# Patient Record
Sex: FEMALE | Race: WHITE | NOT HISPANIC OR LATINO | Employment: OTHER | URBAN - METROPOLITAN AREA
[De-identification: names, ages, dates, MRNs, and addresses within clinical notes are randomized per-mention and may not be internally consistent; named-entity substitution may affect disease eponyms.]

---

## 2017-01-23 DIAGNOSIS — I10 ESSENTIAL HYPERTENSION WITH GOAL BLOOD PRESSURE LESS THAN 140/90: Primary | ICD-10-CM

## 2017-01-23 RX ORDER — PROPRANOLOL HCL 60 MG
60 CAPSULE, EXTENDED RELEASE 24HR ORAL DAILY
Qty: 90 CAPSULE | Refills: 1 | Status: SHIPPED | OUTPATIENT
Start: 2017-01-23 | End: 2017-07-19

## 2017-01-23 NOTE — TELEPHONE ENCOUNTER
Propranolol      Last Written Prescription Date: 7/26/16  Last Fill Quantity: 90, # refills: 1    Last Office Visit with G, P or [facility] prescribing provider:  9/1/16   Future Office Visit:        BP Readings from Last 3 Encounters:   11/01/16 153/85   10/11/16 131/84   09/29/16 122/72     Last BP 11/1/16 was specialty visit.  Routing refill request to provider for review/approval because:  BP > 140/90

## 2017-01-23 NOTE — TELEPHONE ENCOUNTER
..Reason for Call:  Medication or medication refill:    Do you use a [facility]?  Name of the pharmacy and phone number for the current request:  [facility] [address] - [phone]    Name of the medication requested: propranolol(INDERAL LA) 60 mg 24 hr cap    Other request: pt states she called it in to her pharmacy one week+ ago; taking last cap today     Can we leave a detailed message on this number? YES    Phone number patient can be reached at: Home number on file [phone] (home)    Best Time: anytime    Call taken on 1/23/2017 at 10:47 AM by Radha Silverman

## 2017-02-22 DIAGNOSIS — M48.061 LUMBAR SPINAL STENOSIS: ICD-10-CM

## 2017-02-22 RX ORDER — HYDROCODONE BITARTRATE AND ACETAMINOPHEN 5; 325 MG/1; MG/1
1-2 TABLET ORAL 3 TIMES DAILY PRN
Qty: 80 TABLET | Refills: 0 | Status: SHIPPED | OUTPATIENT
Start: 2017-02-22 | End: 2017-06-13

## 2017-02-22 NOTE — TELEPHONE ENCOUNTER
Norco 5/325mg      Last Written Prescription Date:  11/15/2016  Last Fill Quantity: 80,   # refills: 0  Last Office Visit with [facility], [facility] or [facility] prescribing provider: 9/1/2016  Future Office visit:       Routing refill request to provider for review/approval because:  Drug not on the [facility], [facility] or [facility] refill protocol or controlled substance    Thank you,    Bull Zamarripa [facility] [place]  P. [phone]

## 2017-02-22 NOTE — TELEPHONE ENCOUNTER
Written rx will be deliver to the pharmacy this afternoon.  Our pharmacy will notify pt when ready for pickup.  Huan Cespedes,  [facility]

## 2017-04-25 ENCOUNTER — OFFICE VISIT (OUTPATIENT)
Dept: FAMILY MEDICINE | Facility: CLINIC | Age: 82
End: 2017-04-25
Payer: MEDICARE

## 2017-04-25 VITALS
BODY MASS INDEX: 34.12 KG/M2 | HEART RATE: 88 BPM | WEIGHT: 185.4 LBS | RESPIRATION RATE: 22 BRPM | HEIGHT: 62 IN | SYSTOLIC BLOOD PRESSURE: 139 MMHG | DIASTOLIC BLOOD PRESSURE: 82 MMHG | TEMPERATURE: 98.6 F | OXYGEN SATURATION: 93 %

## 2017-04-25 DIAGNOSIS — N30.00 ACUTE CYSTITIS WITHOUT HEMATURIA: Primary | ICD-10-CM

## 2017-04-25 LAB
ALBUMIN UR-MCNC: NEGATIVE MG/DL
APPEARANCE UR: CLEAR
BACTERIA #/AREA URNS HPF: ABNORMAL /HPF
BILIRUB UR QL STRIP: NEGATIVE
COLOR UR AUTO: YELLOW
GLUCOSE UR STRIP-MCNC: NEGATIVE MG/DL
HGB UR QL STRIP: ABNORMAL
KETONES UR STRIP-MCNC: NEGATIVE MG/DL
LEUKOCYTE ESTERASE UR QL STRIP: ABNORMAL
NITRATE UR QL: NEGATIVE
NON-SQ EPI CELLS #/AREA URNS LPF: ABNORMAL /LPF
PH UR STRIP: 6 PH (ref 5–7)
RBC #/AREA URNS AUTO: ABNORMAL /HPF (ref 0–2)
SP GR UR STRIP: 1.01 (ref 1–1.03)
URN SPEC COLLECT METH UR: ABNORMAL
UROBILINOGEN UR STRIP-ACNC: 0.2 EU/DL (ref 0.2–1)
WBC #/AREA URNS AUTO: ABNORMAL /HPF (ref 0–2)

## 2017-04-25 PROCEDURE — 99213 OFFICE O/P EST LOW 20 MIN: CPT | Performed by: PHYSICIAN ASSISTANT

## 2017-04-25 PROCEDURE — 81001 URINALYSIS AUTO W/SCOPE: CPT | Performed by: PHYSICIAN ASSISTANT

## 2017-04-25 PROCEDURE — 87086 URINE CULTURE/COLONY COUNT: CPT | Performed by: PHYSICIAN ASSISTANT

## 2017-04-25 RX ORDER — CIPROFLOXACIN 250 MG/1
250 TABLET, FILM COATED ORAL 2 TIMES DAILY
Qty: 14 TABLET | Refills: 0 | Status: SHIPPED | OUTPATIENT
Start: 2017-04-25 | End: 2017-05-02

## 2017-04-25 ASSESSMENT — PAIN SCALES - GENERAL: PAINLEVEL: NO PAIN (0)

## 2017-04-25 NOTE — MR AVS SNAPSHOT
After Visit Summary   4/25/2017    Ivonne Gerard    MRN: [id number]           Patient Information     Date Of Birth          1935        Visit Information        Provider Department      4/25/2017 11:20 AM Karo Vogel PA-C [facility]        Today's Diagnoses     Urinary frequency    -  1    Acute cystitis without hematuria          Care Instructions    Cipro 250 mg twice a day for 7 days  Increase fluids  Follow up or call in 3 days if not better  Urine culture is pending  For prevention wipe front to back, and wash with soap and water    Understanding Urinary Tract Infections (UTIs)  Most UTIs are caused by bacteria, although they may also be caused by viruses or fungi. Bacteria from the bowel are the most common source of infection. The infection may begin because of any of the following:    Sexual activity. During sex, germs can travel from the penis, vagina, or rectum into the urethra.     Germs on the skin outside the rectum may travel into the urethra. This is more common in women since the rectum and urethra are closer to each other than in men. Wiping from front to back after using the toilet and keeping the area clean can help prevent germs from getting to the urethra.    Blockage of urine flow through the urinary tract. If urine sits too long, germs may begin to grow out of control.      Parts of the urinary tract  The infection can occur in any part of the urinary tract.    The kidneys collect and store urine.    The ureters carry urine from the kidneys to the bladder.    The bladder holds urine until you are ready to let it out.    The urethra carries urine from the bladder out of the body. It is shorter in women, so bacteria can move through it more easily. The urethra is longer in men, so a UTI is less likely to reach the bladder or kidneys in men.    2280-4605 [employer] [address] [place] [address], [place] [address]. All rights  "reserved. This information is not intended as a substitute for professional medical care. Always follow your healthcare professional's instructions.              Follow-ups after your visit        Who to contact     If you have questions or need follow up information about today's clinic visit or your schedule please contact [facility] directly at [phone].  Normal or non-critical lab and imaging results will be communicated to you by MyChart, letter or phone within 4 business days after the clinic has received the results. If you do not hear from us within 7 days, please contact the clinic through [employer] or phone. If you have a critical or abnormal lab result, we will notify you by phone as soon as possible.  Submit refill requests through [employer] or call your pharmacy and they will forward the refill request to us. Please allow 3 business days for your refill to be completed.          Additional Information About Your Visit        MyChart Information     [employer] gives you secure access to your electronic health record. If you see a primary care provider, you can also send messages to your care team and make appointments. If you have questions, please call your primary care clinic.  If you do not have a primary care provider, please call [phone] and they will assist you.        Care EveryWhere ID     This is your Care EveryWhere ID. This could be used by other organizations to access your [facility] medical records  [id number]        Your Vitals Were     Pulse Temperature Respirations Height Pulse Oximetry BMI (Body Mass Index)    88 98.6  F (37  C) (Oral) 22 5' 2\" (1.575 m) 93% 33.91 kg/m2       Blood Pressure from Last 3 Encounters:   04/25/17 139/82   11/01/16 153/85   10/11/16 131/84    Weight from Last 3 Encounters:   04/25/17 185 lb 6.4 oz (84.1 kg)   11/01/16 187 lb (84.8 kg)   10/04/16 180 lb (81.6 kg)              We Performed the Following     *UA reflex to Microscopic and " Culture ([place] [facility] (except [place] and [place])     Urine Culture Aerobic Bacterial     Urine Microscopic          Today's Medication Changes          These changes are accurate as of: 4/25/17 12:17 PM.  If you have any questions, ask your nurse or doctor.               Start taking these medicines.        Dose/Directions    ciprofloxacin 250 MG tablet   Commonly known as:  CIPRO   Used for:  Acute cystitis without hematuria   Started by:  Karo Vogel PA-C        Dose:  250 mg   Take 1 tablet (250 mg) by mouth 2 times daily for 7 days   Quantity:  14 tablet   Refills:  0            Where to get your medicines      These medications were [place] to [facility] [place] - [address]  [address], [facility] [address]     Phone:  [phone]     ciprofloxacin 250 MG tablet                Primary Care Provider Office Phone # Fax #    Luna Jozef Lu [phone]       [address] [address]        Thank you!     Thank you for choosing [facility]  for your care. Our goal is always to provide you with excellent care. Hearing back from our patients is one way we can continue to improve our services. Please take a few minutes to complete the written survey that you may receive in the mail after your visit with us. Thank you!             Your Updated Medication List - Protect others around you: Learn how to safely use, store and throw away your medicines at www.disposemymeds.org.          This list is accurate as of: 4/25/17 12:17 PM.  Always use your most recent med list.                   Brand Name Dispense Instructions for use    aspirin 81 MG tablet     90 tablet    Take 1 tablet (81 mg) by mouth daily       ciprofloxacin 250 MG tablet    CIPRO    14 tablet    Take 1 tablet (250 mg) by mouth 2 times daily for 7 days       diclofenac 75 MG EC tablet     VOLTAREN    180 tablet    Take 1 tablet (75 mg) by mouth 2 times daily       ferrous sulfate 325 (65 FE) MG tablet    IRON    60 tablet    Take 1 tablet (325 mg) by mouth 2 times daily       HYDROcodone-acetaminophen 5-325 MG per tablet    NORCO    80 tablet    Take 1-2 tablets by mouth 3 times daily as needed for pain       IMODIUM A-D 2 MG tablet   Generic drug:  loperamide      1 TABLET AS NEEDED       omeprazole 20 MG CR capsule    priLOSEC    90 capsule    TAKE 1 CAPSULE BY MOUTH EVERY DAY       phenazopyridine 200 MG tablet    PYRIDIUM    6 tablet    Take 1 tablet (200 mg) by mouth 3 times daily as needed for irritation Expect your urine to appear bright orange       propranolol 60 MG 24 hr capsule    INDERAL LA    90 capsule    Take 1 capsule (60 mg) by mouth daily       sertraline 100 MG tablet    ZOLOFT    90 tablet    Take 1 tablet (100 mg) by mouth daily

## 2017-04-25 NOTE — PATIENT INSTRUCTIONS
Cipro 250 mg twice a day for 7 days  Increase fluids  Follow up or call in 3 days if not better  Urine culture is pending  For prevention wipe front to back, and wash with soap and water    Understanding Urinary Tract Infections (UTIs)  Most UTIs are caused by bacteria, although they may also be caused by viruses or fungi. Bacteria from the bowel are the most common source of infection. The infection may begin because of any of the following:    Sexual activity. During sex, germs can travel from the penis, vagina, or rectum into the urethra.     Germs on the skin outside the rectum may travel into the urethra. This is more common in women since the rectum and urethra are closer to each other than in men. Wiping from front to back after using the toilet and keeping the area clean can help prevent germs from getting to the urethra.    Blockage of urine flow through the urinary tract. If urine sits too long, germs may begin to grow out of control.      Parts of the urinary tract  The infection can occur in any part of the urinary tract.    The kidneys collect and store urine.    The ureters carry urine from the kidneys to the bladder.    The bladder holds urine until you are ready to let it out.    The urethra carries urine from the bladder out of the body. It is shorter in women, so bacteria can move through it more easily. The urethra is longer in men, so a UTI is less likely to reach the bladder or kidneys in men.    9078-9395 [employer]. [address] [place] [address]. All rights reserved. This information is not intended as a substitute for professional medical care. Always follow your healthcare professional's instructions.

## 2017-04-25 NOTE — PROGRESS NOTES
SUBJECTIVE:                                                    Ivonne Gerard is a 81 year old female who presents to clinic today for the following health issues:      URINARY TRACT SYMPTOMS     Onset: 3 days     Description:   Painful urination (Dysuria): YES  Blood in urine (Hematuria): no   Delay in urine (Hesitency): YES    Intensity: moderate    Progression of Symptoms:  worsening and constant    Accompanying Signs & Symptoms:  Fever/chills: YES- 4/24/017 only   Flank pain no   Nausea and vomiting: YES- 4/24/2017  Any vaginal symptoms: vaginal discharge, 4/22 & 4/23 and   Abdominal/Pelvic Pain: no    History:   History of frequent UTI's: YES  History of kidney stones: no   Sexually Active: no   Possibility of pregnancy: No    Precipitating factors:   Loose stools          Therapies Tried and outcome: Cranberry juice prn (contraindicated in Coumadin patients)            Problem list and histories reviewed & adjusted, as indicated.  Additional history: as documented    Patient Active Problem List   Diagnosis     Obesity     Cataract     Anxiety associated with depression     Arthritis     Osteopenia     Allergic rhinitis     Advanced directives, counseling/discussion     Recurrent UTI     Postmenopausal atrophic vaginitis     Major depression in complete remission (H)     Lumbar spinal stenosis     Wrist fracture     Scoliosis associated with other condition     Postlaminectomy syndrome     H/O total hip arthroplasty     Major depression, recurrent (H)     Osteoporosis     Infected hardware in left leg, initial encounter (H)     Essential hypertension with goal blood pressure less than 140/90     Gastroesophageal reflux disease, esophagitis presence not specified     Fracture of ankle     Cellulitis of lower extremity     Positive FIT (fecal immunochemical test)     Anemia, unspecified type     Past Surgical History:   Procedure Laterality Date     COLONOSCOPY  9/25/2012    Procedure: COLONOSCOPY;   COLONOSCOPY SCREEN/ [facility];  Surgeon: Xiang Nielsen MD;  Location: [facility] OR     COLONOSCOPY WITH CO2 INSUFFLATION N/A 10/11/2016    Procedure: COLONOSCOPY WITH CO2 INSUFFLATION;  Surgeon: Lynne Hansen MD;  Location: [facility] OR     FOOT SURGERY Left End of July 2016    Hardware removal     SURGICAL HISTORY OF -   1994    right total hip     SURGICAL HISTORY OF - 9/98    repair perforated left ear drum     SURGICAL HISTORY OF - 5/99    discectomy lumbar       Social History   Substance Use Topics     Smoking status: Former Smoker     Packs/day: 1.00     Years: 30.00     Types: Cigarettes     Quit date: 2/10/1986     Smokeless tobacco: Never Used      Comment: quit 20 yrs     Alcohol use Yes      Comment: once a month      Family History   Problem Relation Age of Onset     Cancer - colorectal Other      cousins on mothers side     Allergies Mother      asthma     Cancer - colorectal Mother      Breast Cancer Mother      Alzheimer Disease Mother      dementia     Coronary Artery Disease Brother      stents     Hyperlipidemia No family hx of      Hypertension No family hx of          Current Outpatient Prescriptions   Medication Sig Dispense Refill     ciprofloxacin (CIPRO) 250 MG tablet Take 1 tablet (250 mg) by mouth 2 times daily for 7 days 14 tablet 0     HYDROcodone-acetaminophen (NORCO) 5-325 MG per tablet Take 1-2 tablets by mouth 3 times daily as needed for pain 80 tablet 0     propranolol (INDERAL LA) 60 MG 24 hr capsule Take 1 capsule (60 mg) by mouth daily 90 capsule 1     sertraline (ZOLOFT) 100 MG tablet Take 1 tablet (100 mg) by mouth daily 90 tablet 1     ferrous sulfate (IRON) 325 (65 FE) MG tablet Take 1 tablet (325 mg) by mouth 2 times daily 60 tablet 2     omeprazole (PRILOSEC) 20 MG capsule TAKE 1 CAPSULE BY MOUTH EVERY DAY 90 capsule 3     diclofenac (VOLTAREN) 75 MG EC tablet Take 1 tablet (75 mg) by mouth 2 times daily 180 tablet 3     aspirin 81 MG tablet Take 1 tablet (81  "mg) by mouth daily 90 tablet 3     IMODIUM A-D 2 MG PO TABS 1 TABLET AS NEEDED       phenazopyridine (PYRIDIUM) 200 MG tablet Take 1 tablet (200 mg) by mouth 3 times daily as needed for irritation Expect your urine to appear bright orange (Patient not taking: Reported on 4/25/2017) 6 tablet 0     Allergies   Allergen Reactions     Sulfa Drugs Hives       Reviewed and updated as needed this visit by clinical staff  Tobacco  Allergies       Reviewed and updated as needed this visit by Provider  Tobacco         ROS:  Constitutional, HEENT, cardiovascular, pulmonary, gi and gu systems are negative, except as otherwise noted.    OBJECTIVE:                                                    /82  Pulse 88  Temp 98.6  F (37  C) (Oral)  Resp 22  Ht 5' 2\" (1.575 m)  Wt 185 lb 6.4 oz (84.1 kg)  SpO2 93%  BMI 33.91 kg/m2  Body mass index is 33.91 kg/(m^2).  GENERAL: no distress and elderly  NECK: no adenopathy, no asymmetry, masses, or scars and thyroid normal to palpation  RESP: lungs clear to auscultation - no rales, rhonchi or wheezes  CV: regular rate and rhythm, normal S1 S2, no S3 or S4, no murmur, click or rub, no peripheral edema and peripheral pulses strong  ABDOMEN: soft, nontender, no hepatosplenomegaly, no masses and bowel sounds normal  MS: no gross musculoskeletal defects noted, no edema  BACK: no CVA tenderness, no paralumbar tenderness    Diagnostic Test Results:  Results for orders placed or performed in visit on 04/25/17 (from the past 24 hour(s))   *UA reflex to Microscopic and Culture ([place] [facility] (except [place] and [place])   Result Value Ref Range    Color Urine Yellow     Appearance Urine Clear     Glucose Urine Negative NEG mg/dL    Bilirubin Urine Negative NEG    Ketones Urine Negative NEG mg/dL    Specific Gravity Urine 1.015 1.003 - 1.035    Blood Urine Small (A) NEG    pH Urine 6.0 5.0 - 7.0 pH    Protein Albumin Urine Negative NEG mg/dL    Urobilinogen Urine 0.2 " 0.2 - 1.0 EU/dL    Nitrite Urine Negative NEG    Leukocyte Esterase Urine Trace (A) NEG    Source Midstream Urine    Urine Microscopic   Result Value Ref Range    WBC Urine 2-5 (A) 0 - 2 /HPF    RBC Urine 2-5 (A) 0 - 2 /HPF    Squamous Epithelial /LPF Urine Few FEW /LPF    Bacteria Urine Few (A) NEG /HPF        ASSESSMENT/PLAN:                                                        ICD-10-CM    1. Acute cystitis without hematuria N30.00 *UA reflex to Microscopic and Culture ([place] [facility] (except [place] and [place])     Urine Microscopic     Urine Culture Aerobic Bacterial     ciprofloxacin (CIPRO) 250 MG tablet   patient has requested Cipro as Macrobid does not work for her. Discussed risks of tendonitis and tendon rupture. Patient is aware.   Cipro 250 mg twice a day for 7 days  Increase fluids  Follow up or call in 3 days if not better  Urine culture is pending  For prevention wipe front to back, and wash with soap and water        Karo Vogel PA-C  [facility]

## 2017-04-25 NOTE — NURSING NOTE
"Chief Complaint   Patient presents with     Urinary Problem       Initial /90 (BP Location: Left arm, Patient Position: Chair, Cuff Size: Adult Large)  Pulse 88  Temp 98.6  F (37  C) (Oral)  Resp 22  Ht 5' 2\" (1.575 m)  Wt 185 lb 6.4 oz (84.1 kg)  SpO2 93%  BMI 33.91 kg/m2 Estimated body mass index is 33.91 kg/(m^2) as calculated from the following:    Height as of this encounter: 5' 2\" (1.575 m).    Weight as of this encounter: 185 lb 6.4 oz (84.1 kg).  Medication Reconciliation: complete     Mary Almanza CMA      "

## 2017-04-26 LAB
BACTERIA SPEC CULT: NORMAL
MICRO REPORT STATUS: NORMAL
SPECIMEN SOURCE: NORMAL

## 2017-04-26 ASSESSMENT — PATIENT HEALTH QUESTIONNAIRE - PHQ9: SUM OF ALL RESPONSES TO PHQ QUESTIONS 1-9: 1

## 2017-06-13 DIAGNOSIS — M48.061 LUMBAR SPINAL STENOSIS: ICD-10-CM

## 2017-06-13 RX ORDER — HYDROCODONE BITARTRATE AND ACETAMINOPHEN 5; 325 MG/1; MG/1
1-2 TABLET ORAL 3 TIMES DAILY PRN
Qty: 80 TABLET | Refills: 0 | Status: SHIPPED | OUTPATIENT
Start: 2017-06-13 | End: 2017-09-25

## 2017-06-13 NOTE — TELEPHONE ENCOUNTER
norco 5-325      Last Written Prescription Date:  2/22/17  Last Fill Quantity: 80,   # refills: 0  Last Office Visit with [facility], [facility] or [facility] prescribing provider: 4/25/17  Future Office visit:       Routing refill request to provider for review/approval because:  Drug not on the [facility], [facility] or [facility] refill protocol or controlled substance  Thank you very kindly!  Kaitlin Lopes [facility] [place]

## 2017-07-19 DIAGNOSIS — I10 ESSENTIAL HYPERTENSION WITH GOAL BLOOD PRESSURE LESS THAN 140/90: ICD-10-CM

## 2017-07-19 NOTE — TELEPHONE ENCOUNTER
propranolol (INDERAL LA) 60 MG 24 hr capsule      Last Written Prescription Date: 1/23/17  Last Fill Quantity: 90, # refills: 1  Last Office Visit with [facility], [facility] or [facility] prescribing provider: 4/25/17       BP Readings from Last 3 Encounters:   04/25/17 139/82   11/01/16 153/85   10/11/16 131/84

## 2017-07-20 DIAGNOSIS — I10 ESSENTIAL HYPERTENSION WITH GOAL BLOOD PRESSURE LESS THAN 140/90: ICD-10-CM

## 2017-07-20 RX ORDER — PROPRANOLOL HCL 60 MG
CAPSULE, EXTENDED RELEASE 24HR ORAL
Qty: 30 CAPSULE | Refills: 0 | Status: SHIPPED | OUTPATIENT
Start: 2017-07-20 | End: 2017-07-20

## 2017-07-24 RX ORDER — PROPRANOLOL HCL 60 MG
CAPSULE, EXTENDED RELEASE 24HR ORAL
Qty: 90 CAPSULE | Refills: 0 | Status: SHIPPED | OUTPATIENT
Start: 2017-07-24 | End: 2017-08-23

## 2017-07-24 NOTE — TELEPHONE ENCOUNTER
Routing refill request to provider for review/approval because:  Patient needs to be seen  propranolol (INDERAL LA) 60 MG 24 hr capsule 30 capsule 0 7/20/2017  No   Sig: TAKE 1 CAPSULE(60 MG) BY MOUTH DAILY   Class: E-Prescribe   Notes to Pharmacy: Due for follow up   Order: [id number]     Janeth Logan RN.

## 2017-08-23 ENCOUNTER — OFFICE VISIT (OUTPATIENT)
Dept: FAMILY MEDICINE | Facility: CLINIC | Age: 82
End: 2017-08-23
Payer: MEDICARE

## 2017-08-23 VITALS
BODY MASS INDEX: 34.6 KG/M2 | HEIGHT: 62 IN | SYSTOLIC BLOOD PRESSURE: 130 MMHG | WEIGHT: 188 LBS | DIASTOLIC BLOOD PRESSURE: 68 MMHG | TEMPERATURE: 98.3 F | HEART RATE: 75 BPM | OXYGEN SATURATION: 95 %

## 2017-08-23 DIAGNOSIS — K21.9 GASTROESOPHAGEAL REFLUX DISEASE, ESOPHAGITIS PRESENCE NOT SPECIFIED: ICD-10-CM

## 2017-08-23 DIAGNOSIS — M48.061 LUMBAR SPINAL STENOSIS: ICD-10-CM

## 2017-08-23 DIAGNOSIS — I10 ESSENTIAL HYPERTENSION WITH GOAL BLOOD PRESSURE LESS THAN 140/90: Primary | ICD-10-CM

## 2017-08-23 DIAGNOSIS — F32.5 MAJOR DEPRESSION IN COMPLETE REMISSION (H): ICD-10-CM

## 2017-08-23 LAB
ANION GAP SERPL CALCULATED.3IONS-SCNC: 5 MMOL/L (ref 3–14)
BUN SERPL-MCNC: 21 MG/DL (ref 7–30)
CALCIUM SERPL-MCNC: 8.4 MG/DL (ref 8.5–10.1)
CHLORIDE SERPL-SCNC: 110 MMOL/L (ref 94–109)
CO2 SERPL-SCNC: 28 MMOL/L (ref 20–32)
CREAT SERPL-MCNC: 0.89 MG/DL (ref 0.52–1.04)
CREAT UR-MCNC: 117 MG/DL
GFR SERPL CREATININE-BSD FRML MDRD: 61 ML/MIN/1.7M2
GLUCOSE SERPL-MCNC: 100 MG/DL (ref 70–99)
MICROALBUMIN UR-MCNC: 10 MG/L
MICROALBUMIN/CREAT UR: 8.89 MG/G CR (ref 0–25)
POTASSIUM SERPL-SCNC: 3.7 MMOL/L (ref 3.4–5.3)
SODIUM SERPL-SCNC: 143 MMOL/L (ref 133–144)

## 2017-08-23 PROCEDURE — 36415 COLL VENOUS BLD VENIPUNCTURE: CPT | Performed by: FAMILY MEDICINE

## 2017-08-23 PROCEDURE — 82043 UR ALBUMIN QUANTITATIVE: CPT | Performed by: FAMILY MEDICINE

## 2017-08-23 PROCEDURE — 99214 OFFICE O/P EST MOD 30 MIN: CPT | Performed by: FAMILY MEDICINE

## 2017-08-23 PROCEDURE — 80048 BASIC METABOLIC PNL TOTAL CA: CPT | Performed by: FAMILY MEDICINE

## 2017-08-23 RX ORDER — PROPRANOLOL HCL 60 MG
CAPSULE, EXTENDED RELEASE 24HR ORAL
Qty: 90 CAPSULE | Refills: 3 | Status: SHIPPED | OUTPATIENT
Start: 2017-08-23 | End: 2018-08-08

## 2017-08-23 RX ORDER — DICLOFENAC SODIUM 75 MG/1
75 TABLET, DELAYED RELEASE ORAL 2 TIMES DAILY
Qty: 180 TABLET | Refills: 3 | Status: SHIPPED | OUTPATIENT
Start: 2017-08-23 | End: 2019-01-03

## 2017-08-23 ASSESSMENT — PAIN SCALES - GENERAL: PAINLEVEL: NO PAIN (0)

## 2017-08-23 NOTE — PROGRESS NOTES
SUBJECTIVE:   Ivonne Gerard is a 81 year old female who presents to clinic today for the following health issues:      Hypertension Follow-up      Outpatient blood pressures are being checked at home.  Results are normal per patient.    Low Salt Diet: low salt        Amount of exercise or physical activity: None    Problems taking medications regularly: No    Medication side effects: none    Diet: low salt and low fat/cholesterol    Problem list and histories reviewed & adjusted, as indicated.  Additional history: as documented    Patient Active Problem List   Diagnosis     Obesity     Cataract     Anxiety associated with depression     Arthritis     Osteopenia     Allergic rhinitis     Advanced directives, counseling/discussion     Recurrent UTI     Postmenopausal atrophic vaginitis     Major depression in complete remission (H)     Lumbar spinal stenosis     Wrist fracture     Scoliosis associated with other condition     Postlaminectomy syndrome     H/O total hip arthroplasty     Major depression, recurrent (H)     Osteoporosis     Infected hardware in left leg, initial encounter (H)     Essential hypertension with goal blood pressure less than 140/90     Gastroesophageal reflux disease, esophagitis presence not specified     Fracture of ankle     Cellulitis of lower extremity     Positive FIT (fecal immunochemical test)     Anemia, unspecified type     Past Surgical History:   Procedure Laterality Date     COLONOSCOPY  9/25/2012    Procedure: COLONOSCOPY;  COLONOSCOPY SCREEN/ [facility];  Surgeon: Xiang Nielsen MD;  Location: [facility]     COLONOSCOPY WITH CO2 INSUFFLATION N/A 10/11/2016    Procedure: COLONOSCOPY WITH CO2 INSUFFLATION;  Surgeon: Lynne Hansen MD;  Location: [facility] OR     FOOT SURGERY Left End of July 2016    Hardware removal     SURGICAL HISTORY OF -   1994    right total hip     SURGICAL HISTORY OF -   9/98    repair perforated left ear drum     SURGICAL HISTORY OF -   5/99     "discectomy lumbar       Social History   Substance Use Topics     Smoking status: Former Smoker     Packs/day: 1.00     Years: 30.00     Types: Cigarettes     Quit date: 2/10/1986     Smokeless tobacco: Never Used      Comment: quit 20 yrs     Alcohol use Yes      Comment: once a month      Family History   Problem Relation Age of Onset     Allergies Mother      asthma     Cancer - colorectal Mother      Breast Cancer Mother      Alzheimer Disease Mother      dementia     Coronary Artery Disease Brother      stents     Cancer - colorectal Other      cousins on mothers side     Hyperlipidemia No family hx of      Hypertension No family hx of              Reviewed and updated as needed this visit by clinical staffTobacco  Allergies  Meds  Med Hx  Surg Hx  Fam Hx  Soc Hx      Reviewed and updated as needed this visit by Provider         ROS:  Constitutional, HEENT, cardiovascular, pulmonary, GI, , musculoskeletal, neuro, skin, endocrine and psych systems are negative, except as otherwise noted.      OBJECTIVE:   /68 (BP Location: Right arm, Patient Position: Chair, Cuff Size: Adult Large)  Pulse 75  Temp 98.3  F (36.8  C) (Oral)  Ht 5' 2\" (1.575 m)  Wt 188 lb (85.3 kg)  SpO2 95%  BMI 34.39 kg/m2  Body mass index is 34.39 kg/(m^2).  GENERAL: healthy, alert and no distress  NECK: no adenopathy, no asymmetry, masses, or scars and thyroid normal to palpation  RESP: lungs clear to auscultation - no rales, rhonchi or wheezes  CV: regular rate and rhythm, normal S1 S2, no S3 or S4, no murmur, click or rub, no peripheral edema and peripheral pulses strong  ABDOMEN: soft, nontender, no hepatosplenomegaly, no masses and bowel sounds normal  MS: no gross musculoskeletal defects noted, no edema    Diagnostic Test Results:  none     ASSESSMENT/PLAN:     1. Essential hypertension with goal blood pressure less than 140/90  Controlled. Continue with current medication. Discussed following up in 6 months for routine " physical.  - propranolol (INDERAL LA) 60 MG 24 hr capsule; TAKE 1 CAPSULE BY MOUTH EVERY DAY  Dispense: 90 capsule; Refill: 3  - Basic metabolic panel  - Albumin Random Urine Quantitative with Creat Ratio    2. Major depression in complete remission (H)  Controlled.  - sertraline (ZOLOFT) 50 MG tablet; Take 1 tablet (50 mg) by mouth daily  Dispense: 90 tablet; Refill: 3    3. Gastroesophageal reflux disease, esophagitis presence not specified  Controlled.  - omeprazole (PRILOSEC) 20 MG CR capsule; TAKE 1 CAPSULE BY MOUTH EVERY DAY  Dispense: 90 capsule; Refill: 3    4. Lumbar spinal stenosis  Stable.  - diclofenac (VOLTAREN) 75 MG EC tablet; Take 1 tablet (75 mg) by mouth 2 times daily  Dispense: 180 tablet; Refill: 3    Regular exercise  See Patient Instructions    Steven Salinas MD, MD  [facility]

## 2017-08-23 NOTE — NURSING NOTE
"Chief Complaint   Patient presents with     Hypertension     Musculoskeletal Problem     cramps inner thigh       Initial /68 (BP Location: Right arm, Patient Position: Chair, Cuff Size: Adult Large)  Pulse 75  Temp 98.3  F (36.8  C) (Oral)  Ht 5' 2\" (1.575 m)  Wt 188 lb (85.3 kg)  SpO2 95%  BMI 34.39 kg/m2 Estimated body mass index is 34.39 kg/(m^2) as calculated from the following:    Height as of this encounter: 5' 2\" (1.575 m).    Weight as of this encounter: 188 lb (85.3 kg).  Medication Reconciliation: complete     Becky De Anda MA      "

## 2017-08-23 NOTE — MR AVS SNAPSHOT
After Visit Summary   8/23/2017    Ivonne Gerard    MRN: [id number]           Patient Information     Date Of Birth          1935        Visit Information        Provider Department      8/23/2017 2:40 PM Steven Salinas MD [facility]        Today's Diagnoses     Essential hypertension with goal blood pressure less than 140/90    -  1    Major depression in complete remission (H)        Gastroesophageal reflux disease, esophagitis presence not specified        Lumbar spinal stenosis           Follow-ups after your visit        Who to contact     If you have questions or need follow up information about today's clinic visit or your schedule please contact [facility] directly at [phone].  Normal or non-critical lab and imaging results will be communicated to you by MyChart, letter or phone within 4 business days after the clinic has received the results. If you do not hear from us within 7 days, please contact the clinic through Sylvan Sourcehart or phone. If you have a critical or abnormal lab result, we will notify you by phone as soon as possible.  Submit refill requests through Second Half Playbook or call your pharmacy and they will forward the refill request to us. Please allow 3 business days for your refill to be completed.          Additional Information About Your Visit        MyChart Information     Second Half Playbook gives you secure access to your electronic health record. If you see a primary care provider, you can also send messages to your care team and make appointments. If you have questions, please call your primary care clinic.  If you do not have a primary care provider, please call [phone] and they will assist you.        Care EveryWhere ID     This is your Care EveryWhere ID. This could be used by other organizations to access your [facility] medical records  [id number]        Your Vitals Were     Pulse Temperature Height Pulse Oximetry BMI (Body Mass Index)        "75 98.3  F (36.8  C) (Oral) 5' 2\" (1.575 m) 95% 34.39 kg/m2        Blood Pressure from Last 3 Encounters:   08/23/17 130/68   04/25/17 139/82   11/01/16 153/85    Weight from Last 3 Encounters:   08/23/17 188 lb (85.3 kg)   04/25/17 185 lb 6.4 oz (84.1 kg)   11/01/16 187 lb (84.8 kg)              We Performed the Following     Albumin Random Urine Quantitative with Creat Ratio     Basic metabolic panel          Today's Medication Changes          These changes are accurate as of: 8/23/17  3:06 PM.  If you have any questions, ask your nurse or doctor.               These medicines have changed or have updated prescriptions.        Dose/Directions    propranolol 60 MG 24 hr capsule   Commonly known as:  INDERAL LA   This may have changed:  See the new instructions.   Used for:  Essential hypertension with goal blood pressure less than 140/90   Changed by:  Steven Salinas MD        TAKE 1 CAPSULE BY MOUTH EVERY DAY   Quantity:  90 capsule   Refills:  3       sertraline 50 MG tablet   Commonly known as:  ZOLOFT   This may have changed:    - medication strength  - how much to take   Used for:  Major depression in complete remission (H)   Changed by:  Steven Salinas MD        Dose:  50 mg   Take 1 tablet (50 mg) by mouth daily   Quantity:  90 tablet   Refills:  3         Stop taking these medicines if you haven't already. Please contact your care team if you have questions.     ferrous sulfate 325 (65 FE) MG tablet   Commonly known as:  IRON   Stopped by:  Steven Salinas MD                Where to get your medicines      These medications were sent to [id number] - [place], [place] - [address] AT [facility] [address], [facility] [address]     Phone:  [phone]     diclofenac 75 MG EC tablet    omeprazole 20 MG CR capsule    propranolol 60 MG 24 hr capsule    sertraline 50 MG tablet                Primary Care Provider Office Phone # Fax #    Steven Salinas -[phone]    "    [address]  [facility] [address]        Equal Access to Services     KVNG HENAO : Hadii giacomo ku hadgarryo Soomaali, waaxda luqadaha, qaybta kaalmada antonellaadeelnani, gloria stevensedwinjessica aguilera . So [facility] [phone].    ATENCIÓN: Si habla español, tiene a saleh disposición servicios gratuitos de asistencia lingüística. Llame al [phone].    We comply with applicable federal civil rights laws and [place] laws. We do not discriminate on the basis of race, color, national origin, age, disability sex, sexual orientation or gender identity.            Thank you!     Thank you for choosing [facility]  for your care. Our goal is always to provide you with excellent care. Hearing back from our patients is one way we can continue to improve our services. Please take a few minutes to complete the written survey that you may receive in the mail after your visit with us. Thank you!             Your Updated Medication List - Protect others around you: Learn how to safely use, store and throw away your medicines at www.disposemymeds.org.          This list is accurate as of: 8/23/17  3:06 PM.  Always use your most recent med list.                   Brand Name Dispense Instructions for use Diagnosis    aspirin 81 MG tablet     90 tablet    Take 1 tablet (81 mg) by mouth daily        diclofenac 75 MG EC tablet    VOLTAREN    180 tablet    Take 1 tablet (75 mg) by mouth 2 times daily    Lumbar spinal stenosis       HYDROcodone-acetaminophen 5-325 MG per tablet    NORCO    80 tablet    Take 1-2 tablets by mouth 3 times daily as needed for pain    Lumbar spinal stenosis       IMODIUM A-D 2 MG tablet   Generic drug:  loperamide      1 TABLET AS NEEDED        omeprazole 20 MG CR capsule    priLOSEC    90 capsule    TAKE 1 CAPSULE BY MOUTH EVERY DAY    Gastroesophageal reflux disease, esophagitis presence not specified       propranolol 60 MG 24 hr capsule    INDERAL LA    90 capsule    TAKE 1  CAPSULE BY MOUTH EVERY DAY    Essential hypertension with goal blood pressure less than 140/90       sertraline 50 MG tablet    ZOLOFT    90 tablet    Take 1 tablet (50 mg) by mouth daily    Major depression in complete remission (H)

## 2017-08-23 NOTE — LETTER
.               My Depression Action Plan  Name: Ivonne Gerard   Date of Birth 1935  Date: 8/23/2017    My doctor: Steven Salinas   My clinic: [address] [id number] [phone]          GREEN    ZONE   Good Control    What it looks like:     Things are going generally well. You have normal up s and down s. You may even feel depressed from time to time, but bad moods usually last less than a day.   What you need to do:  1. Continue to care for yourself (see self care plan)  2. Check your depression survival kit and update it as needed  3. Follow your physician s recommendations including any medication.  4. Do not stop taking medication unless you consult with your physician first.           YELLOW         ZONE Getting Worse    What it looks like:     Depression is starting to interfere with your life.     It may be hard to get out of bed; you may be starting to isolate yourself from others.    Symptoms of depression are starting to last most all day and this has happened for several days.     You may have suicidal thoughts but they are not constant.   What you need to do:     1. Call your care team, your response to treatment will improve if you keep your care team informed of your progress. Yellow periods are signs an adjustment may need to be made.     2. Continue your self-care, even if you have to fake it!    3. Talk to someone in your support network    4. Open up your depression survival kit           RED    ZONE Medical Alert - Get Help    What it looks like:     Depression is seriously interfering with your life.     You may experience these or other symptoms: You can t get out of bed most days, can t work or engage in other necessary activities, you have trouble taking care of basic hygiene, or basic responsibilities, thoughts of suicide or death that will not go away, self-injurious behavior.     What you need to do:  1. Call your care team  and request a same-day appointment. If they are not available (weekends or after hours) call your local crisis line, emergency room or 911.      Electronically signed by: Becky De Anda, August 23, 2017    Depression Self Care Plan / Survival Kit    Self-Care for Depression  Here s the deal. Your body and mind are really not as separate as most people think.  What you do and think affects how you feel and how you feel influences what you do and think. This means if you do things that people who feel good do, it will help you feel better.  Sometimes this is all it takes.  There is also a place for medication and therapy depending on how severe your depression is, so be sure to consult with your medical provider and/ or Behavioral Health Consultant if your symptoms are worsening or not improving.     In order to better manage my stress, I will:    Exercise  Get some form of exercise, every day. This will help reduce pain and release endorphins, the  feel good  chemicals in your brain. This is almost as good as taking antidepressants!  This is not the same as joining a gym and then never going! (they count on that by the way ) It can be as simple as just going for a walk or doing some gardening, anything that will get you moving.      Hygiene   Maintain good hygiene (Get out of bed in the morning, Make your bed, Brush your teeth, Take a shower, and Get dressed like you were going to work, even if you are unemployed).  If your clothes don't fit try to get ones that do.    Diet  I will strive to eat foods that are good for me, drink plenty of water, and avoid excessive sugar, caffeine, alcohol, and other mood-altering substances.  Some foods that are helpful in depression are: complex carbohydrates, B vitamins, flaxseed, fish or fish oil, fresh fruits and vegetables.    Psychotherapy  I agree to participate in Individual Therapy (if recommended).    Medication  If prescribed medications, I agree to take them.  Missing doses  can result in serious side effects.  I understand that drinking alcohol, or other illicit drug use, may cause potential side effects.  I will not stop my medication abruptly without first discussing it with my provider.    Staying Connected With Others  I will stay in touch with my friends, family members, and my primary care provider/team.    Use your imagination  Be creative.  We all have a creative side; it doesn t matter if it s oil painting, sand castles, or mud pies! This will also kick up the endorphins.    Witness Beauty  (AKA stop and smell the roses) Take a look outside, even in mid-winter. Notice colors, textures. Watch the squirrels and birds.     Service to others  Be of service to others.  There is always someone else in need.  By helping others we can  get out of ourselves  and remember the really important things.  This also provides opportunities for practicing all the other parts of the program.    Humor  Laugh and be silly!  Adjust your TV habits for less news and crime-drama and more comedy.    Control your stress  Try breathing deep, massage therapy, biofeedback, and meditation. Find time to relax each day.     My support system    Clinic Contact:  Phone number:    Contact 1:  Phone number:    Contact 2:  Phone number:    Zoroastrianism/:  Phone number:    Therapist:  Phone number:    Local crisis center:    Phone number:    Other community support:  Phone number:

## 2017-09-25 DIAGNOSIS — M48.061 LUMBAR SPINAL STENOSIS: ICD-10-CM

## 2017-09-25 NOTE — TELEPHONE ENCOUNTER
Norco 5-325mg Tabs      Last Written Prescription Date:  06/13/2017  Last Fill Quantity: 80,   # refills: 0  Last Office Visit with [facility], [facility] or [facility] prescribing provider: 08/23/2017  Future Office visit:       Routing refill request to provider for review/approval because:  Drug not on the [facility], [facility] or [facility] refill protocol or controlled substance    Thank You!  [facility] [place]  [phone]

## 2017-09-26 RX ORDER — HYDROCODONE BITARTRATE AND ACETAMINOPHEN 5; 325 MG/1; MG/1
1-2 TABLET ORAL 3 TIMES DAILY PRN
Qty: 80 TABLET | Refills: 0 | Status: SHIPPED | OUTPATIENT
Start: 2017-09-26 | End: 2017-12-27

## 2017-10-11 ENCOUNTER — ALLIED HEALTH/NURSE VISIT (OUTPATIENT)
Dept: NURSING | Facility: CLINIC | Age: 82
End: 2017-10-11
Payer: MEDICARE

## 2017-10-11 DIAGNOSIS — Z23 NEED FOR PROPHYLACTIC VACCINATION AND INOCULATION AGAINST INFLUENZA: Primary | ICD-10-CM

## 2017-10-11 PROCEDURE — 99207 ZZC NO CHARGE NURSE ONLY: CPT

## 2017-10-11 PROCEDURE — G0008 ADMIN INFLUENZA VIRUS VAC: HCPCS

## 2017-10-11 PROCEDURE — 90662 IIV NO PRSV INCREASED AG IM: CPT

## 2017-10-11 NOTE — MR AVS SNAPSHOT
After Visit Summary   10/11/2017    Ivonne Gerard    MRN: [id number]           Patient Information     Date Of Birth          1935        Visit Information        Provider Department      10/11/2017 9:40 AM [facility]        Today's Diagnoses     Need for prophylactic vaccination and inoculation against influenza    -  1       Follow-ups after your visit        Who to contact     If you have questions or need follow up information about today's clinic visit or your schedule please contact [facility] directly at [phone].  Normal or non-critical lab and imaging results will be communicated to you by Simmrhart, letter or phone within 4 business days after the clinic has received the results. If you do not hear from us within 7 days, please contact the clinic through miiCardt or phone. If you have a critical or abnormal lab result, we will notify you by phone as soon as possible.  Submit refill requests through expressor software or call your pharmacy and they will forward the refill request to us. Please allow 3 business days for your refill to be completed.          Additional Information About Your Visit        MyChart Information     expressor software gives you secure access to your electronic health record. If you see a primary care provider, you can also send messages to your care team and make appointments. If you have questions, please call your primary care clinic.  If you do not have a primary care provider, please call [phone] and they will assist you.        Care EveryWhere ID     This is your Care EveryWhere ID. This could be used by other organizations to access your [facility] medical records  [id number]         Blood Pressure from Last 3 Encounters:   08/23/17 130/68   04/25/17 139/82   11/01/16 153/85    Weight from Last 3 Encounters:   08/23/17 188 lb (85.3 kg)   04/25/17 185 lb 6.4 oz (84.1 kg)   11/01/16 187 lb (84.8 kg)              We  Performed the Following     FLU VACCINE, INCREASED ANTIGEN, PRESV FREE, AGE 65+ [79811]     Vaccine Administration, Initial [45010]        Primary Care Provider Office Phone # Fax #    Steven Salinas [phone]       [address]  [facility] [address]        Equal Access to Services     [facility] Center[facility]ARED : Hadii aad ku hadasho Soomaali, waaxda luqadaha, qaybta kaalmada adeegyada, waxay idiin hayaan adedang kharash lakrista . So [facility] [phone].    ATENCIÓN: Si habla español, tiene a saleh disposición servicios gratuitos de asistencia lingüística. Llame al [phone].    We comply with applicable federal civil rights laws and [place] laws. We do not discriminate on the basis of race, color, national origin, age, disability, sex, sexual orientation, or gender identity.            Thank you!     Thank you for choosing [facility]  for your care. Our goal is always to provide you with excellent care. Hearing back from our patients is one way we can continue to improve our services. Please take a few minutes to complete the written survey that you may receive in the mail after your visit with us. Thank you!             Your Updated Medication List - Protect others around you: Learn how to safely use, store and throw away your medicines at www.disposemymeds.org.          This list is accurate as of: 10/11/17 10:15 AM.  Always use your most recent med list.                   Brand Name Dispense Instructions for use Diagnosis    aspirin 81 MG tablet     90 tablet    Take 1 tablet (81 mg) by mouth daily        diclofenac 75 MG EC tablet    VOLTAREN    180 tablet    Take 1 tablet (75 mg) by mouth 2 times daily    Lumbar spinal stenosis       HYDROcodone-acetaminophen 5-325 MG per tablet    NORCO    80 tablet    Take 1-2 tablets by mouth 3 times daily as needed for pain    Lumbar spinal stenosis       IMODIUM A-D 2 MG tablet   Generic drug:  loperamide      1 TABLET AS NEEDED        omeprazole  20 MG CR capsule    priLOSEC    90 capsule    TAKE 1 CAPSULE BY MOUTH EVERY DAY    Gastroesophageal reflux disease, esophagitis presence not specified       propranolol 60 MG 24 hr capsule    INDERAL LA    90 capsule    TAKE 1 CAPSULE BY MOUTH EVERY DAY    Essential hypertension with goal blood pressure less than 140/90       sertraline 50 MG tablet    ZOLOFT    90 tablet    Take 1 tablet (50 mg) by mouth daily    Major depression in complete remission (H)

## 2017-10-11 NOTE — PROGRESS NOTES
Injectable Influenza Immunization Documentation    1.  Is the person to be vaccinated sick today?   No    2. Does the person to be vaccinated have an allergy to a component   of the vaccine?   No    3. Has the person to be vaccinated ever had a serious reaction   to influenza vaccine in the past?   No    4. Has the person to be vaccinated ever had Guillain-Barré syndrome?   No    Form completed by Juan Wall MA

## 2017-12-27 ENCOUNTER — TELEPHONE (OUTPATIENT)
Dept: FAMILY MEDICINE | Facility: CLINIC | Age: 82
End: 2017-12-27

## 2017-12-27 DIAGNOSIS — M48.061 SPINAL STENOSIS OF LUMBAR REGION WITHOUT NEUROGENIC CLAUDICATION: ICD-10-CM

## 2018-01-02 RX ORDER — HYDROCODONE BITARTRATE AND ACETAMINOPHEN 5; 325 MG/1; MG/1
1-2 TABLET ORAL 3 TIMES DAILY PRN
Qty: 80 TABLET | Refills: 0 | Status: SHIPPED | OUTPATIENT
Start: 2018-01-02 | End: 2018-03-25

## 2018-01-02 NOTE — TELEPHONE ENCOUNTER
Written rx will be deliver to the  this afternoon for pickup after 3pm.  Huan Cespedes,  [facility]    Please call patient to let them know the above info.  And remind the person who is picking up to bring their photo ID.  Huan Cespedes,  [facility]     NOTE: If patient/gaurdian calls the clinic before the care teams gets a chance to call them, please let pt know the above information regarding pickup times, remind them to bring a photo ID and close the encounter.  Huan Cespedes,  [facility]    FYI:  Anything completed after 2:00p will not be delivered until the next business day after 11a.   Huan Cespedes,  [facility].

## 2018-01-02 NOTE — TELEPHONE ENCOUNTER
..Reason for Call:    prescription status check    Detailed comments: Patient states - per pharmacy call the clinic    Phone Number Patient can be reached at: Home number on file [phone] (home)    Best Time: anytime    Can we leave a detailed message on this number? YES    Call taken on 1/2/2018 at 12:26 PM by Radha Silverman

## 2018-01-03 NOTE — TELEPHONE ENCOUNTER
We never received this RX.  Was supposed to be brought to us yesterday.  Ale Cespedes said she didn't know where Rx is.  Please advise.  Thank you very kindly!  Kaitlin Lopes Annalee  [facility]

## 2018-01-03 NOTE — TELEPHONE ENCOUNTER
Patient checking status to see if this has been located  Please call her and advise and ok to confirm on voicemail    Thank you

## 2018-01-03 NOTE — TELEPHONE ENCOUNTER
Called and spoke to Kaitlin, notified her the script is at the , she will call patient once medication is ready for pickup.  Huan Cespedes,  [facility]

## 2018-03-25 DIAGNOSIS — M48.061 SPINAL STENOSIS OF LUMBAR REGION WITHOUT NEUROGENIC CLAUDICATION: ICD-10-CM

## 2018-03-27 RX ORDER — HYDROCODONE BITARTRATE AND ACETAMINOPHEN 5; 325 MG/1; MG/1
1-2 TABLET ORAL 3 TIMES DAILY PRN
Qty: 80 TABLET | Refills: 0 | Status: SHIPPED | OUTPATIENT
Start: 2018-03-27 | End: 2018-06-30

## 2018-04-26 ENCOUNTER — OFFICE VISIT (OUTPATIENT)
Dept: FAMILY MEDICINE | Facility: CLINIC | Age: 83
End: 2018-04-26
Payer: MEDICARE

## 2018-04-26 VITALS
WEIGHT: 189 LBS | BODY MASS INDEX: 34.78 KG/M2 | SYSTOLIC BLOOD PRESSURE: 119 MMHG | DIASTOLIC BLOOD PRESSURE: 78 MMHG | HEIGHT: 62 IN | HEART RATE: 78 BPM | OXYGEN SATURATION: 95 % | TEMPERATURE: 98.5 F

## 2018-04-26 DIAGNOSIS — R21 RASH: Primary | ICD-10-CM

## 2018-04-26 PROCEDURE — 99214 OFFICE O/P EST MOD 30 MIN: CPT | Performed by: FAMILY MEDICINE

## 2018-04-26 RX ORDER — TRIAMCINOLONE ACETONIDE 1 MG/G
OINTMENT TOPICAL
Qty: 80 G | Refills: 3 | Status: SHIPPED | OUTPATIENT
Start: 2018-04-26

## 2018-04-26 ASSESSMENT — PAIN SCALES - GENERAL: PAINLEVEL: NO PAIN (0)

## 2018-04-26 NOTE — PROGRESS NOTES
SUBJECTIVE:   Ivonne Gerard is a 82 year old female who presents to clinic today for the following health issues:      Rash  Onset: 3 weeks    Description:   Location: left low back  Character: round, red  Itching (Pruritis): YES    Progression of Symptoms:  same    Accompanying Signs & Symptoms:  Fever: no   Body aches or joint pain: no   Sore throat symptoms: no   Recent cold symptoms: no     History:   Previous similar rash: no     Precipitating factors:   Exposure to similar rash: no   New exposures: None   Recent travel: no     Alleviating factors:  None.    Therapies Tried and outcome: neosporin, hydrocortisone    Problem list and histories reviewed & adjusted, as indicated.  Additional history: as documented    Patient Active Problem List   Diagnosis     Obesity     Cataract     Anxiety associated with depression     Arthritis     Osteopenia     Allergic rhinitis     Advanced directives, counseling/discussion     Recurrent UTI     Postmenopausal atrophic vaginitis     Major depression in complete remission (H)     Lumbar spinal stenosis     Wrist fracture     Scoliosis associated with other condition     Postlaminectomy syndrome     H/O total hip arthroplasty     Major depression, recurrent (H)     Osteoporosis     Infected hardware in left leg, initial encounter (H)     Essential hypertension with goal blood pressure less than 140/90     Gastroesophageal reflux disease, esophagitis presence not specified     Fracture of ankle     Cellulitis of lower extremity     Positive FIT (fecal immunochemical test)     Anemia, unspecified type     Past Surgical History:   Procedure Laterality Date     COLONOSCOPY  9/25/2012    Procedure: COLONOSCOPY;  COLONOSCOPY SCREEN/ [facility] JDS;  Surgeon: Xiang Nielsen MD;  Location: [facility] OR     COLONOSCOPY WITH CO2 INSUFFLATION N/A 10/11/2016    Procedure: COLONOSCOPY WITH CO2 INSUFFLATION;  Surgeon: Lynne Hansen MD;  Location: [facility] OR     FOOT SURGERY Left  "End of July 2016    Hardware removal     SURGICAL HISTORY OF -   1994    right total hip     SURGICAL HISTORY OF -   9/98    repair perforated left ear drum     SURGICAL HISTORY OF - 5/99    discectomy lumbar       Social History   Substance Use Topics     Smoking status: Former Smoker     Packs/day: 1.00     Years: 30.00     Types: Cigarettes     Quit date: 2/10/1986     Smokeless tobacco: Never Used      Comment: quit 20 yrs     Alcohol use Yes      Comment: once a month      Family History   Problem Relation Age of Onset     Allergies Mother      asthma     Cancer - colorectal Mother      Breast Cancer Mother      Alzheimer Disease Mother      dementia     Coronary Artery Disease Brother      stents     Cancer - colorectal Other      cousins on mothers side     Hyperlipidemia No family hx of      Hypertension No family hx of            Reviewed and updated as needed this visit by clinical staff  Tobacco  Allergies  Meds  Med Hx  Surg Hx  Fam Hx  Soc Hx      Reviewed and updated as needed this visit by Provider         ROS:  Constitutional, HEENT, cardiovascular, pulmonary, GI, , musculoskeletal, neuro, skin, endocrine and psych systems are negative, except as otherwise noted.    OBJECTIVE:     /78 (BP Location: Left arm, Patient Position: Chair, Cuff Size: Adult Large)  Pulse 78  Temp 98.5  F (36.9  C) (Oral)  Ht 5' 2\" (1.575 m)  Wt 189 lb (85.7 kg)  SpO2 95%  BMI 34.57 kg/m2  Body mass index is 34.57 kg/(m^2).  GENERAL: healthy, alert and no distress  NECK: no adenopathy, no asymmetry, masses, or scars and thyroid normal to palpation  RESP: lungs clear to auscultation - no rales, rhonchi or wheezes  CV: regular rate and rhythm, normal S1 S2, no S3 or S4, no murmur, click or rub, no peripheral edema and peripheral pulses strong  ABDOMEN: soft, nontender, no hepatosplenomegaly, no masses and bowel sounds normal  MS: no gross musculoskeletal defects noted, no edema  SKIN: erythematous patch " left buttock 3 cm in diameter  Diagnostic Test Results:  none     ASSESSMENT/PLAN:     1. Rash  Contact dermatitis. Treat with topical steroid. If no improvements, patient will let us know.  - triamcinolone (KENALOG) 0.1 % ointment; Apply sparingly to affected area three times daily for 14 days.  Dispense: 80 g; Refill: 3    See Patient Instructions    Steven Salinas MD, MD  [facility]

## 2018-04-26 NOTE — MR AVS SNAPSHOT
After Visit Summary   4/26/2018    Ivonne Gerard    MRN: [id number]           Patient Information     Date Of Birth          1935        Visit Information        Provider Department      4/26/2018 5:40 PM Steven Salinas MD [facility]        Today's Diagnoses     Rash    -  1      Care Instructions    At [facility], we strive to deliver an exceptional experience to you, every time we see you.  If you receive a survey in the mail, please send us back your thoughts. We really do value your feedback.    Based on your medical history, these are the current health maintenance/preventive care services that you are due for (some may have been done at this visit.)  Health Maintenance Due   Topic Date Due     ADVANCE DIRECTIVE PLANNING Q5 YRS  03/13/2017     MEDICARE ANNUAL WELLNESS VISIT  07/26/2017     PHQ-9 Q6 MONTHS  10/25/2017     FALL RISK ASSESSMENT  04/25/2018         Suggested websites for health information:  Www.[employer] : Up to date and easily searchable information on multiple topics.  Www.medlineplus.gov : medication info, interactive tutorials, watch real surgeries online  Www.familydoctor.org : good info from the Academy of Family Physicians  Www.cdc.gov : public health info, travel advisories, epidemics (H1N1)  Www.aap.org : children's health info, normal development, vaccinations  Www.health.state.[place]us : [place] dept of health, public health issues in [place], N1N1    Your care team:                            Family Medicine Internal Medicine   MD Cr Singh MD Shantel Branch-Fleming, MD Katya Georgiev PA-C Nam Ho, MD Pediatrics   MARELY Sandoval, SONU Chandler APRN MD Neetu Patterson MD Deborah Mielke, MD Kim Thein, APRN CNP      Clinic hours: Monday - Thursday 7 am-7 pm; Fridays 7 am-5 pm.   Urgent care: Monday - Friday 11 am-9 pm; Saturday and Sunday 9 am-5 pm.  Pharmacy :  "Monday -Thursday 8 am-8 pm; Friday 8 am-6 pm; Saturday and Sunday 9 am-5 pm.     Clinic: [phone]   Pharmacy: [phone]            Follow-ups after your visit        Who to contact     If you have questions or need follow up information about today's clinic visit or your schedule please contact [facility] JAN STEIN directly at [phone].  Normal or non-critical lab and imaging results will be communicated to you by tagWALLEThart, letter or phone within 4 business days after the clinic has received the results. If you do not hear from us within 7 days, please contact the clinic through [employer] or phone. If you have a critical or abnormal lab result, we will notify you by phone as soon as possible.  Submit refill requests through [employer] or call your pharmacy and they will forward the refill request to us. Please allow 3 business days for your refill to be completed.          Additional Information About Your Visit        [employer]ar[employer] Information     [employer] gives you secure access to your electronic health record. If you see a primary care provider, you can also send messages to your care team and make appointments. If you have questions, please call your primary care clinic.  If you do not have a primary care provider, please call [phone] and they will assist you.        Care EveryWhere ID     This is your Care EveryWhere ID. This could be used by other organizations to access your [facility] medical records  [id number]        Your Vitals Were     Pulse Temperature Height Pulse Oximetry BMI (Body Mass Index)       78 98.5  F (36.9  C) (Oral) 5' 2\" (1.575 m) 95% 34.57 kg/m2        Blood Pressure from Last 3 Encounters:   04/26/18 119/78   08/23/17 130/68   04/25/17 139/82    Weight from Last 3 Encounters:   04/26/18 189 lb (85.7 kg)   08/23/17 188 lb (85.3 kg)   04/25/17 185 lb 6.4 oz (84.1 kg)              Today, you had the following     No orders found for display         Today's Medication " Changes          These changes are accurate as of 4/26/18  6:08 PM.  If you have any questions, ask your nurse or doctor.               Start taking these medicines.        Dose/Directions    triamcinolone 0.1 % ointment   Commonly known as:  KENALOG   Used for:  Rash   Started by:  Steven Salinas MD        Apply sparingly to affected area three times daily for 14 days.   Quantity:  80 g   Refills:  3            Where to get your medicines      These medications were sent to [id number] - [facility], [place] - [address] AT [facility] [address], [facility] [address]     Phone:  [phone]     triamcinolone 0.1 % ointment                Primary Care Provider Office Phone # Fax #    Steven Salinas [phone] [address]  [facility] [address]        Equal Access to Services     [facility] AH: Hadii aad ku hadasho Soomaali, waaxda luqadaha, qaybta kaalmada adeegyada, waxay idiin hayaan antonella kharash willy . So [facility] [phone].    ATENCIÓN: Si pamla espanna marie, tiene a saleh disposición servicios gratuitos de asistencia lingüística. Llame al [phone].    We comply with applicable federal civil rights laws and [place] laws. We do not discriminate on the basis of race, color, national origin, age, disability, sex, sexual orientation, or gender identity.            Thank you!     Thank you for choosing [facility]  for your care. Our goal is always to provide you with excellent care. Hearing back from our patients is one way we can continue to improve our services. Please take a few minutes to complete the written survey that you may receive in the mail after your visit with us. Thank you!             Your Updated Medication List - Protect others around you: Learn how to safely use, store and throw away your medicines at www.disposemymeds.org.          This list is accurate as of 4/26/18  6:08 PM.  Always use your most recent med  list.                   Brand Name Dispense Instructions for use Diagnosis    aspirin 81 MG tablet     90 tablet    Take 1 tablet (81 mg) by mouth daily        diclofenac 75 MG EC tablet    VOLTAREN    180 tablet    Take 1 tablet (75 mg) by mouth 2 times daily    Lumbar spinal stenosis       HYDROcodone-acetaminophen 5-325 MG per tablet    NORCO    80 tablet    Take 1-2 tablets by mouth 3 times daily as needed for pain    Spinal stenosis of lumbar region without neurogenic claudication       IMODIUM A-D 2 MG tablet   Generic drug:  loperamide      1 TABLET AS NEEDED        omeprazole 20 MG CR capsule    priLOSEC    90 capsule    TAKE 1 CAPSULE BY MOUTH EVERY DAY    Gastroesophageal reflux disease, esophagitis presence not specified       propranolol 60 MG 24 hr capsule    INDERAL LA    90 capsule    TAKE 1 CAPSULE BY MOUTH EVERY DAY    Essential hypertension with goal blood pressure less than 140/90       sertraline 50 MG tablet    ZOLOFT    90 tablet    Take 1 tablet (50 mg) by mouth daily    Major depression in complete remission (H)       triamcinolone 0.1 % ointment    KENALOG    80 g    Apply sparingly to affected area three times daily for 14 days.    Rash

## 2018-04-26 NOTE — PATIENT INSTRUCTIONS
At [facility], we strive to deliver an exceptional experience to you, every time we see you.  If you receive a survey in the mail, please send us back your thoughts. We really do value your feedback.    Based on your medical history, these are the current health maintenance/preventive care services that you are due for (some may have been done at this visit.)  Health Maintenance Due   Topic Date Due     ADVANCE DIRECTIVE PLANNING Q5 YRS  03/13/2017     MEDICARE ANNUAL WELLNESS VISIT  07/26/2017     PHQ-9 Q6 MONTHS  10/25/2017     FALL RISK ASSESSMENT  04/25/2018         Suggested websites for health information:  Www.[facility].org : Up to date and easily searchable information on multiple topics.  Www.medlineplus.gov : medication info, interactive tutorials, watch real surgeries online  Www.familydoctor.org : good info from the Academy of Family Physicians  Www.cdc.gov : public health info, travel advisories, epidemics (H1N1)  Www.aap.org : children's health info, normal development, vaccinations  Www.health.[facility].[place].us : [place] dept of health, public health issues in [place], N1N1    Your care team:                            Family Medicine Internal Medicine   MD Cr Singh MD Shantel Branch-Fleming, MD Katya Georgiev PA-C Nam Ho, MD Pediatrics   MARELY Sandoval, SONU Chandler APRN CNP   MD Neetu Pereira MD Deborah Mielke, MD Kim Thein, APRN CNP      Clinic hours: Monday - Thursday 7 am-7 pm; Fridays 7 am-5 pm.   Urgent care: Monday - Friday 11 am-9 pm; Saturday and Sunday 9 am-5 pm.  Pharmacy : Monday -Thursday 8 am-8 pm; Friday 8 am-6 pm; Saturday and Sunday 9 am-5 pm.     Clinic: [phone]   Pharmacy: [phone]

## 2018-04-27 ASSESSMENT — PATIENT HEALTH QUESTIONNAIRE - PHQ9: SUM OF ALL RESPONSES TO PHQ QUESTIONS 1-9: 1

## 2018-06-30 DIAGNOSIS — M48.061 SPINAL STENOSIS OF LUMBAR REGION WITHOUT NEUROGENIC CLAUDICATION: ICD-10-CM

## 2018-06-30 RX ORDER — HYDROCODONE BITARTRATE AND ACETAMINOPHEN 5; 325 MG/1; MG/1
1-2 TABLET ORAL 3 TIMES DAILY PRN
Qty: 80 TABLET | Refills: 0 | Status: SHIPPED | OUTPATIENT
Start: 2018-06-30 | End: 2018-11-20

## 2018-06-30 NOTE — TELEPHONE ENCOUNTER
Norco 5-325mg Tabs      Last Written Prescription Date:  03/27/2018  Last Fill Quantity: 80,   # refills: 0  Last Office Visit with [facility], [facility] or [facility] prescribing provider: 08/23/2017  Future Office visit:        Routing refill request to provider for review/approval because:  Drug not on the [facility], [facility] or [facility] refill protocol or controlled substance     Thank You!  Rowan Armstrongukwu  [facility] [place]  [phone]

## 2018-07-01 NOTE — TELEPHONE ENCOUNTER
Ok with refill. rx should be in printer. Please have someone sign rx for pharmacy.    Steven Salinas MD

## 2018-07-02 NOTE — TELEPHONE ENCOUNTER
Written rx is signed by Dr. Bose and will be deliver to the pharmacy this afternoon.  Our pharmacy will notify pt when ready for pickup.  Huan Cespedes,  For [facility]

## 2018-08-08 ENCOUNTER — TELEPHONE (OUTPATIENT)
Dept: FAMILY MEDICINE | Facility: CLINIC | Age: 83
End: 2018-08-08

## 2018-08-08 DIAGNOSIS — I10 ESSENTIAL HYPERTENSION WITH GOAL BLOOD PRESSURE LESS THAN 140/90: ICD-10-CM

## 2018-08-08 NOTE — LETTER
August 13, 2018          Ivonne Gerard  [address]   [facility] [address]          Dear Mrs Gerard,    At [facility] we care about your health and are committed to providing quality patient care. Regular appointments are a vital part of the care and management of your health and can help prevent many of the complications that can occur.      It has come to our attention that you are due for Physical prior to further medication refills.  Please call [facility] at [phone] soon to schedule your follow up appointment.    If you have transferred care to another clinic please call to inform us so that we do not continue to send you reminder letters.    Sincerely,        [facility] Care Team/v

## 2018-08-08 NOTE — TELEPHONE ENCOUNTER
"Requested Prescriptions   Pending Prescriptions Disp Refills     propranolol (INDERAL LA) 60 MG 24 hr capsule [Pharmacy Med Name: PROPRANOLOL ER 60MG CAPSULES]  Last Written Prescription Date:  08/21/17  Last Fill Quantity: 90,  # refills: 3   Last Office Visit with G, P or [facility] prescribing provider:  04/26/18   Future Office Visit:    90 capsule 0     Sig: TAKE 1 CAPSULE BY MOUTH EVERY DAY    Beta-Blockers Protocol Passed    8/8/2018 10:47 AM       Passed - Blood pressure under 140/90 in past 12 months    BP Readings from Last 3 Encounters:   04/26/18 119/78   08/23/17 130/68   04/25/17 139/82                Passed - Patient is age 6 or older       Passed - Recent (12 mo) or future (30 days) visit within the authorizing provider's specialty    Patient had office visit in the last 12 months or has a visit in the next 30 days with authorizing provider or within the authorizing provider's specialty.  See \"Patient Info\" tab in inbasket, or \"Choose Columns\" in Meds & Orders section of the refill encounter.              "

## 2018-08-09 RX ORDER — PROPRANOLOL HCL 60 MG
CAPSULE, EXTENDED RELEASE 24HR ORAL
Qty: 90 CAPSULE | Refills: 0 | Status: SHIPPED | OUTPATIENT
Start: 2018-08-09 | End: 2019-02-08

## 2018-08-09 NOTE — TELEPHONE ENCOUNTER
This writer attempted to contact patient  on 08/09/18      Reason for call medication has been sent to patient's pharmacy as a lebron refill, patient is due for an appointment with Dr. Salinas, patient may call our appointment line and schedule an appointment. and left message.      If patient calls back:   Schedule Office Visit appointment with Dr. Salinas, document that pt called and close encounter         Huan Cespedes

## 2018-08-09 NOTE — TELEPHONE ENCOUNTER
Medication is being filled for 1 time refill only due to:  Patient needs to be seen because she was supposed to have a routine physical in January of 2018 but patient was only seen in the last year for a rash, HTN was not addressed at this visit. Patient needs appointment.      Team, please call patient and notify that she has been given a lebron refill of Rx and needs to be seen for annual physical for further refills, per provider note in 08/2017.     Keri Plaza RN

## 2018-08-13 NOTE — TELEPHONE ENCOUNTER
Tried calling patient and someone picked up then another person in the background said to hang up and they hang up the phone.     Patient has not been active on mychart recently. A letter will be sent to patient  Duke Maradiaga CMA

## 2018-08-20 ENCOUNTER — TRANSFERRED RECORDS (OUTPATIENT)
Dept: HEALTH INFORMATION MANAGEMENT | Facility: CLINIC | Age: 83
End: 2018-08-20

## 2018-09-20 ENCOUNTER — TRANSFERRED RECORDS (OUTPATIENT)
Dept: HEALTH INFORMATION MANAGEMENT | Facility: CLINIC | Age: 83
End: 2018-09-20

## 2018-10-23 ENCOUNTER — TRANSFERRED RECORDS (OUTPATIENT)
Dept: HEALTH INFORMATION MANAGEMENT | Facility: CLINIC | Age: 83
End: 2018-10-23

## 2018-11-12 ENCOUNTER — TRANSFERRED RECORDS (OUTPATIENT)
Dept: HEALTH INFORMATION MANAGEMENT | Facility: CLINIC | Age: 83
End: 2018-11-12

## 2018-11-15 ENCOUNTER — MEDICAL CORRESPONDENCE (OUTPATIENT)
Dept: HEALTH INFORMATION MANAGEMENT | Facility: CLINIC | Age: 83
End: 2018-11-15

## 2018-11-20 ENCOUNTER — OFFICE VISIT (OUTPATIENT)
Dept: FAMILY MEDICINE | Facility: CLINIC | Age: 83
End: 2018-11-20
Payer: MEDICARE

## 2018-11-20 VITALS
TEMPERATURE: 98.3 F | SYSTOLIC BLOOD PRESSURE: 129 MMHG | OXYGEN SATURATION: 96 % | DIASTOLIC BLOOD PRESSURE: 82 MMHG | RESPIRATION RATE: 16 BRPM | HEART RATE: 69 BPM

## 2018-11-20 DIAGNOSIS — M48.061 SPINAL STENOSIS OF LUMBAR REGION WITHOUT NEUROGENIC CLAUDICATION: ICD-10-CM

## 2018-11-20 DIAGNOSIS — M48.02 CERVICAL STENOSIS OF SPINAL CANAL: Primary | ICD-10-CM

## 2018-11-20 DIAGNOSIS — E78.5 HYPERLIPIDEMIA LDL GOAL <130: ICD-10-CM

## 2018-11-20 PROCEDURE — 99213 OFFICE O/P EST LOW 20 MIN: CPT | Performed by: FAMILY MEDICINE

## 2018-11-20 RX ORDER — GABAPENTIN 600 MG/1
TABLET, FILM COATED ORAL
COMMUNITY
End: 2018-11-20

## 2018-11-20 RX ORDER — METHOCARBAMOL 500 MG/1
750 TABLET, FILM COATED ORAL 4 TIMES DAILY PRN
Qty: 120 TABLET | Refills: 3 | Status: SHIPPED | OUTPATIENT
Start: 2018-11-20 | End: 2020-08-31

## 2018-11-20 RX ORDER — HYDROCODONE BITARTRATE AND ACETAMINOPHEN 5; 325 MG/1; MG/1
1-2 TABLET ORAL 3 TIMES DAILY PRN
Qty: 80 TABLET | Refills: 0 | Status: SHIPPED | OUTPATIENT
Start: 2018-11-20 | End: 2019-01-16

## 2018-11-20 RX ORDER — ATORVASTATIN CALCIUM 10 MG/1
10 TABLET, FILM COATED ORAL DAILY
Qty: 90 TABLET | Refills: 3 | Status: SHIPPED | OUTPATIENT
Start: 2018-11-20 | End: 2019-11-04

## 2018-11-20 RX ORDER — GABAPENTIN 600 MG/1
600 TABLET, FILM COATED ORAL 3 TIMES DAILY
Qty: 270 TABLET | Refills: 3 | Status: SHIPPED | OUTPATIENT
Start: 2018-11-20 | End: 2019-02-08

## 2018-11-20 ASSESSMENT — PAIN SCALES - GENERAL: PAINLEVEL: NO PAIN (0)

## 2018-11-20 NOTE — PROGRESS NOTES
SUBJECTIVE:   Ivonne Gerard is a 83 year old female who presents to clinic today for the following health issues:    Hospital Follow-up Visit:    Hospital/Nursing Home/ Rehab Facility: [facility]   Date of Admission: 08/18/2018  Date of Discharge: 8/23/2018  Reason(s) for Admission: Fell            Problems taking medications regularly:  None       Medication changes since discharge: None       Problems adhering to non-medication therapy:  None    Summary of hospitalization:  CareEverywhere information obtained and reviewed  Diagnostic Tests/Treatments reviewed.  Follow up needed: none  Other Healthcare Providers Involved in Patient s Care:         None  Update since discharge: improved.     Post Discharge Medication Reconciliation: discharge medications reconciled, continue medications without change.  Plan of care communicated with patient     Coding guidelines for this visit:  Type of Medical   Decision Making Face-to-Face Visit       within 7 Days of discharge Face-to-Face Visit        within 14 days of discharge   Moderate Complexity 63065 [id number]   High Complexity 87221 06629            Problem list and histories reviewed & adjusted, as indicated.  Additional history: as documented    Patient Active Problem List   Diagnosis     Obesity     Cataract     Anxiety associated with depression     Arthritis     Osteopenia     Allergic rhinitis     Advanced directives, counseling/discussion     Recurrent UTI     Postmenopausal atrophic vaginitis     Major depression in complete remission (H)     Lumbar spinal stenosis     Wrist fracture     Scoliosis associated with other condition     Postlaminectomy syndrome     H/O total hip arthroplasty     Major depression, recurrent (H)     Osteoporosis     Infected hardware in left leg, initial encounter (H)     Essential hypertension with goal blood pressure less than 140/90     Gastroesophageal reflux disease, esophagitis  presence not specified     Fracture of ankle     Cellulitis of lower extremity     Positive FIT (fecal immunochemical test)     Anemia, unspecified type     Past Surgical History:   Procedure Laterality Date     COLONOSCOPY  9/25/2012    Procedure: COLONOSCOPY;  COLONOSCOPY SCREEN/ [facility];  Surgeon: Xiang Nielsen MD;  Location: [facility] OR     COLONOSCOPY WITH CO2 INSUFFLATION N/A 10/11/2016    Procedure: COLONOSCOPY WITH CO2 INSUFFLATION;  Surgeon: Lynne Hansen MD;  Location: [facility] OR     FOOT SURGERY Left End of July 2016    Hardware removal     SURGICAL HISTORY OF -   1994    right total hip     SURGICAL HISTORY OF -   9/98    repair perforated left ear drum     SURGICAL HISTORY OF -   5/99    discectomy lumbar       Social History   Substance Use Topics     Smoking status: Former Smoker     Packs/day: 1.00     Years: 30.00     Types: Cigarettes     Quit date: 2/10/1986     Smokeless tobacco: Never Used      Comment: quit 20 yrs     Alcohol use Yes      Comment: once a month      Family History   Problem Relation Age of Onset     Allergies Mother      asthma     Cancer - colorectal Mother      Breast Cancer Mother      Alzheimer Disease Mother      dementia     Coronary Artery Disease Brother      stents     Cancer - colorectal Other      cousins on mothers side     Hyperlipidemia No family hx of      Hypertension No family hx of            Reviewed and updated as needed this visit by clinical staff       Reviewed and updated as needed this visit by Provider         ROS:  Constitutional, HEENT, cardiovascular, pulmonary, GI, , musculoskeletal, neuro, skin, endocrine and psych systems are negative, except as otherwise noted.    OBJECTIVE:     /82 (BP Location: Left arm, Patient Position: Chair, Cuff Size: Adult Large)  Pulse 69  Temp 98.3  F (36.8  C) (Oral)  Resp 16  SpO2 96%  There is no height or weight on file to calculate BMI.  GENERAL: healthy, alert and no distress  NECK: no  adenopathy, no asymmetry, masses, or scars and thyroid normal to palpation  RESP: lungs clear to auscultation - no rales, rhonchi or wheezes  CV: regular rate and rhythm, normal S1 S2, no S3 or S4, no murmur, click or rub, no peripheral edema and peripheral pulses strong  ABDOMEN: soft, nontender, no hepatosplenomegaly, no masses and bowel sounds normal  MS: no gross musculoskeletal defects noted, no edema    Diagnostic Test Results:  none     ASSESSMENT/PLAN:     1. Cervical stenosis of spinal canal  S/p laminectomy. Doing well. Continue with current medications. Patient will f/u with Neurosurgery.  - gabapentin (GRALISE) 600 MG 24 hr tablet; Take 1 tablet (600 mg) by mouth 3 times daily  Dispense: 270 tablet; Refill: 3  - methocarbamol (ROBAXIN) 500 MG tablet; Take 1.5 tablets (750 mg) by mouth 4 times daily as needed for muscle spasms  Dispense: 120 tablet; Refill: 3  - HYDROcodone-acetaminophen (NORCO) 5-325 MG per tablet; Take 1-2 tablets by mouth 3 times daily as needed for pain  Dispense: 80 tablet; Refill: 0    2. Hyperlipidemia LDL goal <130  Controlled.  - atorvastatin (LIPITOR) 10 MG tablet; Take 1 tablet (10 mg) by mouth daily  Dispense: 90 tablet; Refill: 3    3. Spinal stenosis of lumbar region without neurogenic claudication  Needed refills to use as needed.  - HYDROcodone-acetaminophen (NORCO) 5-325 MG per tablet; Take 1-2 tablets by mouth 3 times daily as needed for pain  Dispense: 80 tablet; Refill: 0    See Patient Instructions    Steven Salinas MD, MD  [facility]

## 2018-11-20 NOTE — MR AVS SNAPSHOT
After Visit Summary   11/20/2018    Ivonne Gerard    MRN: [id number]           Patient Information     Date Of Birth          1935        Visit Information        Provider Department      11/20/2018 3:20 PM Steven Salinas MD [facility]        Today's Diagnoses     Cervical stenosis of spinal canal    -  1    Hyperlipidemia LDL goal <130        Spinal stenosis of lumbar region without neurogenic claudication           Follow-ups after your visit        Follow-up notes from your care team     Return in about 6 months (around 5/20/2019).      Who to contact     If you have questions or need follow up information about today's clinic visit or your schedule please contact [facility] directly at [phone].  Normal or non-critical lab and imaging results will be communicated to you by MyChart, letter or phone within 4 business days after the clinic has received the results. If you do not hear from us within 7 days, please contact the clinic through [employer] or phone. If you have a critical or abnormal lab result, we will notify you by phone as soon as possible.  Submit refill requests through [employer] or call your pharmacy and they will forward the refill request to us. Please allow 3 business days for your refill to be completed.          Additional Information About Your Visit        MyChart Information     [employer] gives you secure access to your electronic health record. If you see a primary care provider, you can also send messages to your care team and make appointments. If you have questions, please call your primary care clinic.  If you do not have a primary care provider, please call [phone] and they will assist you.        Care EveryWhere ID     This is your Care EveryWhere ID. This could be used by other organizations to access your [facility] medical records  [id number]        Your Vitals Were     Pulse Temperature Respirations Pulse Oximetry           69 98.3  F (36.8  C) (Oral) 16 96%         Blood Pressure from Last 3 Encounters:   11/20/18 129/82   04/26/18 119/78   08/23/17 130/68    Weight from Last 3 Encounters:   04/26/18 189 lb (85.7 kg)   08/23/17 188 lb (85.3 kg)   04/25/17 185 lb 6.4 oz (84.1 kg)              Today, you had the following     No orders found for display         Today's Medication Changes          These changes are accurate as of 11/20/18  3:52 PM.  If you have any questions, ask your nurse or doctor.               Start taking these medicines.        Dose/Directions    atorvastatin 10 MG tablet   Commonly known as:  LIPITOR   Used for:  Hyperlipidemia LDL goal <130   Started by:  Steven Salinas MD        Dose:  10 mg   Take 1 tablet (10 mg) by mouth daily   Quantity:  90 tablet   Refills:  3         These medicines have changed or have updated prescriptions.        Dose/Directions    gabapentin 600 MG 24 hr tablet   Commonly known as:  GRALISE   This may have changed:    - how much to take  - when to take this   Used for:  Cervical stenosis of spinal canal   Changed by:  Steven Salinas MD        Dose:  600 mg   Take 1 tablet (600 mg) by mouth 3 times daily   Quantity:  270 tablet   Refills:  3       methocarbamol 500 MG tablet   Commonly known as:  ROBAXIN   This may have changed:    - when to take this  - reasons to take this   Used for:  Cervical stenosis of spinal canal   Changed by:  Steven Salinas MD        Dose:  750 mg   Take 1.5 tablets (750 mg) by mouth 4 times daily as needed for muscle spasms   Quantity:  120 tablet   Refills:  3            Where to get your medicines      These medications were sent to [id number] - [place] - [address] [facility] [address], [place] [address]     Phone:  [phone]     atorvastatin 10 MG tablet    gabapentin 600 MG 24 hr tablet    methocarbamol 500 MG tablet         Some of these will need a paper prescription and others can be bought  over the counter.  Ask your nurse if you have questions.     Bring a paper prescription for each of these medications     HYDROcodone-acetaminophen 5-325 MG per tablet               Information about OPIOIDS     PRESCRIPTION OPIOIDS: WHAT YOU NEED TO KNOW   We gave you an opioid (narcotic) pain medicine. It is important to manage your pain, but opioids are not always the best choice. You should first try all the other options your care team gave you. Take this medicine for as short a time (and as few doses) as possible.    Some activities can increase your pain, such as bandage changes or therapy sessions. It may help to take your pain medicine 30 to 60 minutes before these activities. Reduce your stress by getting enough sleep, working on hobbies you enjoy and practicing relaxation or meditation. Talk to your care team about ways to manage your pain beyond prescription opioids.    These medicines have risks:    DO NOT drive when on new or higher doses of pain medicine. These medicines can affect your alertness and reaction times, and you could be arrested for driving under the influence (DUI). If you need to use opioids long-term, talk to your care team about driving.    DO NOT operate heavy machinery    DO NOT do any other dangerous activities while taking these medicines.    DO NOT drink any alcohol while taking these medicines.     If the opioid prescribed includes acetaminophen, DO NOT take with any other medicines that contain acetaminophen. Read all labels carefully. Look for the word  acetaminophen  or  Tylenol.  Ask your pharmacist if you have questions or are unsure.    You can get addicted to pain medicines, especially if you have a history of addiction (chemical, alcohol or substance dependence). Talk to your care team about ways to reduce this risk.    All opioids tend to cause constipation. Drink plenty of water and eat foods that have a lot of fiber, such as fruits, vegetables, prune juice, apple  juice and high-fiber cereal. Take a laxative (Miralax, milk of magnesia, Colace, Senna) if you don t move your bowels at least every other day. Other side effects include upset stomach, sleepiness, dizziness, throwing up, tolerance (needing more of the medicine to have the same effect), physical dependence and slowed breathing.    Store your pills in a secure place, locked if possible. We will not replace any lost or stolen medicine. If you don t finish your medicine, please throw away (dispose) as directed by your pharmacist. The [place] Pollution Control Agency has more information about safe disposal: [email or web address]://[email or web address].[email or web address]/living-[email or web address]/managing-unwanted-medications         Primary Care Provider Office Phone # Fax #    Steven Salinas -[phone]       [address] [place]  [facility] [address]        Equal Access to Services     [facility]: Hadii giacomo iniguez hadasho Soomaali, waaxda luqadaha, qaybta kaalmada adeegyada, gloria aguilera . So [facility] [phone].    ATENCIÓN: Si habla español, tiene a saleh disposición servicios gratuitos de asistencia lingüística. Llame al [phone].    We comply with applicable federal civil rights laws and [place] laws. We do not discriminate on the basis of race, color, national origin, age, disability, sex, sexual orientation, or gender identity.            Thank you!     Thank you for choosing [facility]  for your care. Our goal is always to provide you with excellent care. Hearing back from our patients is one way we can continue to improve our services. Please take a few minutes to complete the written survey that you may receive in the mail after your visit with us. Thank you!             Your Updated Medication List - Protect others around you: Learn how to safely use, store and throw away your medicines at www.disposemymeds.org.          This list is accurate as of 11/20/18  3:52 PM.  Always use your most recent  med list.                   Brand Name Dispense Instructions for use Diagnosis    aspirin 81 MG tablet     90 tablet    Take 1 tablet (81 mg) by mouth daily        atorvastatin 10 MG tablet    LIPITOR    90 tablet    Take 1 tablet (10 mg) by mouth daily    Hyperlipidemia LDL goal <130       diclofenac 75 MG EC tablet    VOLTAREN    180 tablet    Take 1 tablet (75 mg) by mouth 2 times daily    Lumbar spinal stenosis       gabapentin 600 MG 24 hr tablet    GRALISE    270 tablet    Take 1 tablet (600 mg) by mouth 3 times daily    Cervical stenosis of spinal canal       HYDROcodone-acetaminophen 5-325 MG per tablet    NORCO    80 tablet    Take 1-2 tablets by mouth 3 times daily as needed for pain    Spinal stenosis of lumbar region without neurogenic claudication, Cervical stenosis of spinal canal       IMODIUM A-D 2 MG tablet   Generic drug:  loperamide      1 TABLET AS NEEDED        methocarbamol 500 MG tablet    ROBAXIN    120 tablet    Take 1.5 tablets (750 mg) by mouth 4 times daily as needed for muscle spasms    Cervical stenosis of spinal canal       omeprazole 20 MG CR capsule    priLOSEC    90 capsule    TAKE 1 CAPSULE BY MOUTH EVERY DAY    Gastroesophageal reflux disease, esophagitis presence not specified       propranolol 60 MG 24 hr capsule    INDERAL LA    90 capsule    TAKE 1 CAPSULE BY MOUTH EVERY DAY    Essential hypertension with goal blood pressure less than 140/90       sertraline 50 MG tablet    ZOLOFT    90 tablet    Take 1 tablet (50 mg) by mouth daily    Major depression in complete remission (H)       triamcinolone 0.1 % ointment    KENALOG    80 g    Apply sparingly to affected area three times daily for 14 days.    Rash

## 2018-11-21 ENCOUNTER — TELEPHONE (OUTPATIENT)
Dept: FAMILY MEDICINE | Facility: CLINIC | Age: 83
End: 2018-11-21

## 2018-11-21 NOTE — TELEPHONE ENCOUNTER
Central Prior Authorization Team   Phone: [phone]      PA NOT NEEDED    Medication: methocarbamol (ROBAXIN) 500 MG tablet-PA NOT NEEDED  Insurance Company:    Pharmacy Filling the Rx:    Filling Pharmacy Phone:    Filling Pharmacy Fax:    Start Date: 11/21/2018    Called pharmacy to verify information and was informed that they found a  that the insurance covers.  PA is not needed.

## 2018-11-27 DIAGNOSIS — Z53.9 DIAGNOSIS NOT YET DEFINED: Primary | ICD-10-CM

## 2018-12-18 ENCOUNTER — TRANSFERRED RECORDS (OUTPATIENT)
Dept: HEALTH INFORMATION MANAGEMENT | Facility: CLINIC | Age: 83
End: 2018-12-18

## 2018-12-20 ENCOUNTER — TELEPHONE (OUTPATIENT)
Dept: FAMILY MEDICINE | Facility: CLINIC | Age: 83
End: 2018-12-20

## 2018-12-20 NOTE — TELEPHONE ENCOUNTER
Reason for Call:  Other Home Care    Detailed comments:  Christy with [facility] calling to notify Dr. Salinas that Pt is ok to discharge from skilled nursing  with bp stable.     Phone Number Patient can be reached at: Other phone number:  [phone]    Best Time: anytime    Can we leave a detailed message on this number? YES    Call taken on 12/20/2018 at 9:38 AM by Everett Kirk

## 2018-12-30 DIAGNOSIS — M48.061 SPINAL STENOSIS OF LUMBAR REGION, UNSPECIFIED WHETHER NEUROGENIC CLAUDICATION PRESENT: ICD-10-CM

## 2018-12-30 NOTE — TELEPHONE ENCOUNTER
"Requested Prescriptions   Pending Prescriptions Disp Refills     diclofenac (VOLTAREN) 75 MG EC tablet 180 tablet 3    Last Written Prescription Date:  8/23/17  Last Fill Quantity: 180,  # refills: 3   Last Office Visit with [facility], [facility] or [facility] prescribing provider:  11/20/18   Future Office Visit:      Sig: Take 1 tablet (75 mg) by mouth 2 times daily    NSAID Medications Failed - 12/30/2018  4:53 PM       Failed - Normal ALT on file in past 12 months    Recent Labs   Lab Test 07/26/16  1351   ALT 14            Failed - Normal AST on file in past 12 months    Recent Labs   Lab Test 07/26/16  1351   AST 14            Failed - Patient is age 6-64 years       Failed - Normal CBC on file in past 12 months    Recent Labs   Lab Test 10/05/16  1226   WBC 8.9   RBC 3.99   HGB 10.9*   HCT 35.1   *                Failed - Normal serum creatinine on file in past 12 months    Recent Labs   Lab Test 08/23/17  1512   CR 0.89            Passed - Blood pressure under 140/90 in past 12 months    BP Readings from Last 3 Encounters:   11/20/18 129/82   04/26/18 119/78   08/23/17 130/68                Passed - Recent (12 mo) or future (30 days) visit within the authorizing provider's specialty    Patient had office visit in the last 12 months or has a visit in the next 30 days with authorizing provider or within the authorizing provider's specialty.  See \"Patient Info\" tab in inbasket, or \"Choose Columns\" in Meds & Orders section of the refill encounter.             Passed - No active pregnancy on record       Passed - No positive pregnancy test in past 12 months          "

## 2019-01-03 RX ORDER — DICLOFENAC SODIUM 75 MG/1
75 TABLET, DELAYED RELEASE ORAL 2 TIMES DAILY
Qty: 180 TABLET | Refills: 3 | Status: SHIPPED | OUTPATIENT
Start: 2019-01-03 | End: 2020-02-07

## 2019-01-03 NOTE — TELEPHONE ENCOUNTER
Routing refill request to provider for review/approval because:  Labs not current:  ALT, AST, CBC, creatinine  Patient is over 64 years old so protocol failed.    Oxana Edwards RN, BSN

## 2019-01-16 DIAGNOSIS — M48.061 SPINAL STENOSIS OF LUMBAR REGION WITHOUT NEUROGENIC CLAUDICATION: ICD-10-CM

## 2019-01-16 DIAGNOSIS — M48.02 CERVICAL STENOSIS OF SPINAL CANAL: ICD-10-CM

## 2019-01-16 RX ORDER — HYDROCODONE BITARTRATE AND ACETAMINOPHEN 5; 325 MG/1; MG/1
1-2 TABLET ORAL 3 TIMES DAILY PRN
Qty: 80 TABLET | Refills: 0 | Status: SHIPPED | OUTPATIENT
Start: 2019-01-16 | End: 2019-03-07

## 2019-01-16 NOTE — TELEPHONE ENCOUNTER
Controlled Substance Refill Request for Norco  Problem List Complete:  No     PROVIDER TO CONSIDER COMPLETION OF PROBLEM LIST AND OVERVIEW/CONTROLLED SUBSTANCE AGREEMENT    Last Written Prescription Date:  11/20/18  Last Fill Quantity: 80 tablets   # refills: 0    THE MOST RECENT OFFICE VISIT MUST BE WITHIN THE PAST 3 MONTHS. (AT LEAST ONE FACE TO FACE VISIT MUST OCCUR EVERY 6 MONTHS. ADDITIONAL VISITS CAN BE VIRTUAL.)    Last Office Visit with [facility] primary care provider: 11/20/18    Future Office visit: None    Controlled substance agreement: [unfilled]    Last Urine Drug Screen: No results found for: CDAUT, No results found for: COMDAT, No results found for: THC13, PCP13, COC13, MAMP13, OPI13, AMP13, BZO13, TCA13, MTD13, BAR13, OXY13, PPX13, BUP13     Processing:  Patient will  in clinic     [email or web address]       checked in past 3 months?  No-unable to run  due to upgrade/pending status          Oxana Edwards RN, BSN

## 2019-01-17 NOTE — TELEPHONE ENCOUNTER
Called spoke to patient, she would like the script to go to our pharmacy to fill for patient.  Huan Cespedes,  [facility]    Written rx will be deliver to the pharmacy this afternoon.  Our pharmacy will notify pt when ready for pickup.  Huan Cespedes,  [facility]

## 2019-02-06 DIAGNOSIS — K21.9 GASTROESOPHAGEAL REFLUX DISEASE, ESOPHAGITIS PRESENCE NOT SPECIFIED: ICD-10-CM

## 2019-02-06 DIAGNOSIS — I10 ESSENTIAL HYPERTENSION WITH GOAL BLOOD PRESSURE LESS THAN 140/90: ICD-10-CM

## 2019-02-06 DIAGNOSIS — M48.02 CERVICAL STENOSIS OF SPINAL CANAL: ICD-10-CM

## 2019-02-06 NOTE — TELEPHONE ENCOUNTER
"Requested Prescriptions   Pending Prescriptions Disp Refills     omeprazole (PRILOSEC) 20 MG DR capsule  Last Written Prescription Date:  08/23/17  Last Fill Quantity: 90,  # refills: 3   Last Office Visit with [facility], [facility] or [facility] prescribing provider:  11/20/18-Ho   Future Office Visit:    90 capsule 3     Sig: TAKE 1 CAPSULE BY MOUTH EVERY DAY    PPI Protocol Failed - 2/6/2019 12:55 PM       Failed - No diagnosis of osteoporosis on record       Passed - Not on Clopidogrel (unless Pantoprazole ordered)       Passed - Recent (12 mo) or future (30 days) visit within the authorizing provider's specialty    Patient had office visit in the last 12 months or has a visit in the next 30 days with authorizing provider or within the authorizing provider's specialty.  See \"Patient Info\" tab in inbasket, or \"Choose Columns\" in Meds & Orders section of the refill encounter.             Passed - Medication is active on med list       Passed - Patient is age 18 or older       Passed - No active pregnacy on record       Passed - No positive pregnancy test in past 12 months        propranolol ER (INDERAL LA) 60 MG 24 hr capsule    Last Written Prescription Date:  08/09/18  Last Fill Quantity: 90,  # refills: 0   Last Office Visit with [facility], [facility] or [facility] prescribing provider:  11/20/18-Ho   Future Office Visit:    90 capsule 0    Beta-Blockers Protocol Passed - 2/6/2019 12:55 PM       Passed - Blood pressure under 140/90 in past 12 months    BP Readings from Last 3 Encounters:   11/20/18 129/82   04/26/18 119/78   08/23/17 130/68                Passed - Patient is age 6 or older       Passed - Recent (12 mo) or future (30 days) visit within the authorizing provider's specialty    Patient had office visit in the last 12 months or has a visit in the next 30 days with authorizing provider or within the authorizing provider's specialty.  See \"Patient Info\" tab in inbasket, or \"Choose Columns\" in Meds & Orders section of the refill " encounter.             Passed - Medication is active on med list        gabapentin (GRALISE) 600 MG 24 hr tablet        Message from pharmacy:  Pt requesting new script, taking 1 tab QAM, 1 tab, QPM and 1.5 tabs at bedtime.  Please send new rx with new sig if appropriate.  Last Written Prescription Date:  11/20/18  Last Fill Quantity: 270,   # refills: 3  Last Office Visit: 11/20/18-Ho  Future Office visit:       Routing refill request to provider for review/approval because:  Drug not on the G, P or [facility] refill protocol or controlled substance 270 tablet 3     Sig: Take 1 tablet (600 mg) by mouth 3 times daily    There is no refill protocol information for this order

## 2019-02-08 RX ORDER — PROPRANOLOL HCL 60 MG
CAPSULE, EXTENDED RELEASE 24HR ORAL
Qty: 90 CAPSULE | Refills: 0 | Status: SHIPPED | OUTPATIENT
Start: 2019-02-08 | End: 2019-05-01

## 2019-02-08 RX ORDER — GABAPENTIN 600 MG/1
600 TABLET, FILM COATED ORAL 3 TIMES DAILY
Qty: 270 TABLET | Refills: 3 | Status: SHIPPED | OUTPATIENT
Start: 2019-02-08 | End: 2019-02-11

## 2019-02-08 NOTE — TELEPHONE ENCOUNTER
Routing refill request to provider for review/approval because:  Requests fail [facility] refill protocol:    Pt has diagnosis of osteoporosis and PPI drug needs provider review    Break in medication regarding Propranolol: last given 18, #90 with 0 refills.  by 18    Gabapentin is not on [facility] refill protocol, RN unable to address    Latasha Olivas RN  [facility] Triage

## 2019-02-11 DIAGNOSIS — M48.02 CERVICAL STENOSIS OF SPINAL CANAL: Primary | ICD-10-CM

## 2019-02-11 NOTE — TELEPHONE ENCOUNTER
Reason for Call:  Medication or medication refill:    Do you use a [facility]?  Name of the pharmacy and phone number for the current request:  [id number] - [place], [place] - [address] [facility]     Name of the medication requested: Extended releast tabs you requested last week requires prior auth.  Patient now taking 3.5 tabs per day instead of just 3 so, is now out needs refills.    Other request: Please call back and advise.    Can we leave a detailed message on this number? Yes      Phone number Pharmacy can be reached at: [phone]    Best Time: any    Call taken on 2/11/2019 at 11:53 AM by Dinorah Hassan

## 2019-02-11 NOTE — TELEPHONE ENCOUNTER
Requested Prescriptions   Pending Prescriptions Disp Refills     gabapentin (GRALISE) 600 MG 24 hr tablet 315 tablet 3     Sig: Take 1 tablet (600 mg) by mouth 3 times daily    There is no refill protocol information for this order        Routing refill request to provider for review/approval because:  Drug not on the [facility] [place] [facility] refill [facility]   Patient wants new prescription sent to pharmacy to reflect her taking 3.5 pills daily. She said it is helping somewhat. She is out so she would like this filled ASAP.       Oxana Edwards RN, BSN

## 2019-02-11 NOTE — TELEPHONE ENCOUNTER
Per November note we called the pharmacy and they found a  that the insurance covers.  Patient is requesting short acting gabapentin in the meantime?    Tomi Sawant PA-C

## 2019-02-12 ENCOUNTER — TELEPHONE (OUTPATIENT)
Dept: FAMILY MEDICINE | Facility: CLINIC | Age: 84
End: 2019-02-12

## 2019-02-12 RX ORDER — GABAPENTIN 600 MG/1
TABLET ORAL
Qty: 135 TABLET | Refills: 1 | Status: SHIPPED | OUTPATIENT
Start: 2019-02-12 | End: 2019-05-21

## 2019-02-12 RX ORDER — GABAPENTIN 600 MG/1
600 TABLET, FILM COATED ORAL 3 TIMES DAILY
Qty: 315 TABLET | Refills: 3 | Status: SHIPPED | OUTPATIENT
Start: 2019-02-12 | End: 2019-05-21

## 2019-02-12 NOTE — TELEPHONE ENCOUNTER
Called and spoke with patient to clarify what dose of the gabapentin she is exactly taking. Unable to find documentation to support the increase to 1 1/2 tabs at bedtime.  Per patient, dose was increased by neurosurgeon with [facility] who had performed her surgery back in August of 2018.   Patient picked up script today given by Dr. Salinas and said it's okay, she will take it as previously ordered. She wasn't noticing that much of a difference when had increased it to the 1 1/2 at bedtime, versus one tab at bedtime. Patient stated the medication does help somewhat, but not greatly. Was out of medication yesterday and had trouble sleeping and noticed difference without taking it at all, had immediate numbness and tingling in feet and was not able to sleep so is okay with taking as originally prescribed ( 1 tab TID). Writer advised for patient to notify PCP if finding this dose is not working or if feels she needs to increase back to 3.5 tabs per day. Patient agreed. No other questions or concerns, at this time.    Latasha Olivas RN  [facility] Triage

## 2019-02-12 NOTE — TELEPHONE ENCOUNTER
Reason for Call:  Other prescription    Detailed comments: ER is not covered needs regular and did you see she takes 3.5 instead of just 3 tabs per day. Please refax new script.    Phone Number can be reached at:   [id number] - [place] - [address] (Pharmacy) [phone]     Best Time: any    Can we leave a detailed message on this number? YES    Call taken on 2/12/2019 at 9:21 AM by Dinorah Hassan

## 2019-02-12 NOTE — TELEPHONE ENCOUNTER
Patient states she is taking 1 tab in the morning, 1 tab in the afternoon, and 1 tab and 1/2 tablets at bedtime. Please authorize new Rx for that dosing.    I got another request for Prior authorization.    Plan does not cover this medication. Please call plan at [phone] to initiate prior authorization or call/fax pharmacy to change medication at [phone]. Patient ID # is [id number].            [facility]

## 2019-02-12 NOTE — TELEPHONE ENCOUNTER
Team please contact patient and ask her if she is requesting short acting gabapentin in the meantime then route to Tomi VILLALOBOS. Please see below messages for better understanding.  Noni Cortes RN

## 2019-03-07 DIAGNOSIS — M48.02 CERVICAL STENOSIS OF SPINAL CANAL: ICD-10-CM

## 2019-03-07 DIAGNOSIS — M48.061 SPINAL STENOSIS OF LUMBAR REGION WITHOUT NEUROGENIC CLAUDICATION: ICD-10-CM

## 2019-03-07 RX ORDER — HYDROCODONE BITARTRATE AND ACETAMINOPHEN 5; 325 MG/1; MG/1
1-2 TABLET ORAL 3 TIMES DAILY PRN
Qty: 80 TABLET | Refills: 0 | Status: SHIPPED | OUTPATIENT
Start: 2019-03-07 | End: 2019-04-23

## 2019-03-18 DIAGNOSIS — F32.5 MAJOR DEPRESSION IN COMPLETE REMISSION (H): ICD-10-CM

## 2019-03-18 NOTE — TELEPHONE ENCOUNTER
"Requested Prescriptions   Pending Prescriptions Disp Refills     sertraline (ZOLOFT) 50 MG tablet  Last Written Prescription Date:  08/23/17  Last Fill Quantity: 90,  # refills: 3   [place] [facility] prescribing provider:  11/20/18-Ho   Future Office Visit:    90 tablet 3     Sig: Take 1 tablet (50 mg) by mouth daily    SSRIs Protocol Failed - 3/18/2019 12:35 PM       Failed - PHQ-9 score less than 5 in past 6 months    Please review last PHQ-9 score.          Passed - Medication is active on med list       Passed - Patient is age 18 or older       Passed - No active pregnancy on record       Passed - No positive pregnancy test in last 12 months       Passed - Recent (6 mo) or future (30 days) visit within the authorizing provider's specialty    Patient had office visit in the last 6 months or has a visit in the next 30 days with authorizing provider or within the authorizing provider's specialty.  See \"Patient Info\" tab in inbasket, or \"Choose Columns\" in Meds & Orders section of the refill encounter.              "

## 2019-03-20 NOTE — TELEPHONE ENCOUNTER
Routing refill request to provider for review/approval because:  A break in medication: last written 8/23/2017 x 1 year. Would have  by 2018.  Past due for depression check up and update PHQ-9, has been greater than 6 months  PHQ-9 score:    PHQ-9 SCORE 2018   PHQ-9 Total Score -   PHQ-9 Total Score 1       Latasha Olivas RN

## 2019-04-18 ENCOUNTER — TRANSFERRED RECORDS (OUTPATIENT)
Dept: HEALTH INFORMATION MANAGEMENT | Facility: CLINIC | Age: 84
End: 2019-04-18

## 2019-04-22 DIAGNOSIS — M48.02 CERVICAL STENOSIS OF SPINAL CANAL: ICD-10-CM

## 2019-04-22 DIAGNOSIS — M48.061 SPINAL STENOSIS OF LUMBAR REGION WITHOUT NEUROGENIC CLAUDICATION: ICD-10-CM

## 2019-04-23 RX ORDER — HYDROCODONE BITARTRATE AND ACETAMINOPHEN 5; 325 MG/1; MG/1
1-2 TABLET ORAL 3 TIMES DAILY PRN
Qty: 80 TABLET | Refills: 0 | Status: SHIPPED | OUTPATIENT
Start: 2019-04-23 | End: 2019-05-21

## 2019-05-01 ENCOUNTER — TELEPHONE (OUTPATIENT)
Dept: FAMILY MEDICINE | Facility: CLINIC | Age: 84
End: 2019-05-01

## 2019-05-01 DIAGNOSIS — I10 ESSENTIAL HYPERTENSION WITH GOAL BLOOD PRESSURE LESS THAN 140/90: ICD-10-CM

## 2019-05-01 NOTE — TELEPHONE ENCOUNTER
"Requested Prescriptions   Pending Prescriptions Disp Refills     propranolol ER (INDERAL LA) 60 MG 24 hr capsule [Pharmacy Med Name: PROPRANOLOL ER 60 MG CAPSULE]      Last Written Prescription Date:  2/8/19  Last Fill Quantity: 90,  # refills: 0   Last Office Visit with [facility], [facility] or [facility] prescribing provider:  11/20/18   Future Office Visit:      90 capsule 0     Sig: TAKE 1 CAPSULE BY MOUTH EVERY DAY       Beta-Blockers Protocol Passed - 5/1/2019 12:02 PM        Passed - Blood pressure under 140/90 in past 12 months     BP Readings from Last 3 Encounters:   11/20/18 129/82   04/26/18 119/78   08/23/17 130/68                 Passed - Patient is age 6 or older        Passed - Recent (12 mo) or future (30 days) visit within the authorizing provider's specialty     Patient had office visit in the last 12 months or has a visit in the next 30 days with authorizing provider or within the authorizing provider's specialty.  See \"Patient Info\" tab in inbasket, or \"Choose Columns\" in Meds & Orders section of the refill encounter.              Passed - Medication is active on med list              Rj Faarax  [facility]  "

## 2019-05-03 RX ORDER — PROPRANOLOL HCL 60 MG
CAPSULE, EXTENDED RELEASE 24HR ORAL
Qty: 30 CAPSULE | Refills: 0 | Status: SHIPPED | OUTPATIENT
Start: 2019-05-03 | End: 2019-05-21

## 2019-05-03 NOTE — TELEPHONE ENCOUNTER
Medication is being filled for 1 time refill only due to:  Patient needs to be seen because she is due for OV for HTN follow-up.     Team, please contact patient and notify her that a lebron refill has been sent and she needs an OV before further refills. Please assist with scheduling if able.    Keri PHILLIPSN, RN

## 2019-05-21 ENCOUNTER — OFFICE VISIT (OUTPATIENT)
Dept: FAMILY MEDICINE | Facility: CLINIC | Age: 84
End: 2019-05-21
Payer: MEDICARE

## 2019-05-21 VITALS
HEIGHT: 62 IN | TEMPERATURE: 98 F | OXYGEN SATURATION: 95 % | SYSTOLIC BLOOD PRESSURE: 116 MMHG | HEART RATE: 74 BPM | DIASTOLIC BLOOD PRESSURE: 69 MMHG | RESPIRATION RATE: 16 BRPM | WEIGHT: 188 LBS | BODY MASS INDEX: 34.6 KG/M2

## 2019-05-21 DIAGNOSIS — M48.061 SPINAL STENOSIS OF LUMBAR REGION WITHOUT NEUROGENIC CLAUDICATION: ICD-10-CM

## 2019-05-21 DIAGNOSIS — I10 ESSENTIAL HYPERTENSION WITH GOAL BLOOD PRESSURE LESS THAN 140/90: Primary | ICD-10-CM

## 2019-05-21 DIAGNOSIS — M48.02 CERVICAL STENOSIS OF SPINAL CANAL: ICD-10-CM

## 2019-05-21 LAB
ANION GAP SERPL CALCULATED.3IONS-SCNC: 7 MMOL/L (ref 3–14)
BUN SERPL-MCNC: 23 MG/DL (ref 7–30)
CALCIUM SERPL-MCNC: 8.8 MG/DL (ref 8.5–10.1)
CHLORIDE SERPL-SCNC: 108 MMOL/L (ref 94–109)
CO2 SERPL-SCNC: 26 MMOL/L (ref 20–32)
CREAT SERPL-MCNC: 0.82 MG/DL (ref 0.52–1.04)
GFR SERPL CREATININE-BSD FRML MDRD: 65 ML/MIN/{1.73_M2}
GLUCOSE SERPL-MCNC: 84 MG/DL (ref 70–99)
POTASSIUM SERPL-SCNC: 4.5 MMOL/L (ref 3.4–5.3)
SODIUM SERPL-SCNC: 141 MMOL/L (ref 133–144)

## 2019-05-21 PROCEDURE — 36415 COLL VENOUS BLD VENIPUNCTURE: CPT | Performed by: FAMILY MEDICINE

## 2019-05-21 PROCEDURE — 99214 OFFICE O/P EST MOD 30 MIN: CPT | Performed by: FAMILY MEDICINE

## 2019-05-21 PROCEDURE — 80048 BASIC METABOLIC PNL TOTAL CA: CPT | Performed by: FAMILY MEDICINE

## 2019-05-21 RX ORDER — PREGABALIN 50 MG/1
50 CAPSULE ORAL 3 TIMES DAILY
Qty: 180 CAPSULE | Refills: 3 | Status: SHIPPED | OUTPATIENT
Start: 2019-05-21 | End: 2019-09-04

## 2019-05-21 RX ORDER — DULOXETIN HYDROCHLORIDE 30 MG/1
30 CAPSULE, DELAYED RELEASE ORAL DAILY
COMMUNITY
End: 2021-01-07

## 2019-05-21 RX ORDER — HYDROCODONE BITARTRATE AND ACETAMINOPHEN 5; 325 MG/1; MG/1
1-2 TABLET ORAL 3 TIMES DAILY PRN
Qty: 80 TABLET | Refills: 0 | Status: SHIPPED | OUTPATIENT
Start: 2019-05-21 | End: 2019-07-11

## 2019-05-21 RX ORDER — PROPRANOLOL HCL 60 MG
CAPSULE, EXTENDED RELEASE 24HR ORAL
Qty: 90 CAPSULE | Refills: 3 | Status: SHIPPED | OUTPATIENT
Start: 2019-05-21 | End: 2020-05-19

## 2019-05-21 ASSESSMENT — PAIN SCALES - GENERAL: PAINLEVEL: SEVERE PAIN (7)

## 2019-05-21 ASSESSMENT — MIFFLIN-ST. JEOR: SCORE: 1261.01

## 2019-05-21 NOTE — PATIENT INSTRUCTIONS
At [facility], we strive to deliver an exceptional experience to you, every time we see you.  If you receive a survey in the mail, please send us back your thoughts. We really do value your feedback.    Based on your medical history, these are the current health maintenance/preventive care services that you are due for (some may have been done at this visit.)  Health Maintenance Due   Topic Date Due     ZOSTER IMMUNIZATION (1 of 2) 10/02/1985     ADVANCED DIRECTIVE PLANNING  03/13/2017     DEXA  06/10/2017     MEDICARE ANNUAL WELLNESS VISIT  07/26/2017     PHQ-9  10/26/2018     FALL RISK ASSESSMENT  04/26/2019         Suggested websites for health information:  Www.[facility].org : Up to date and easily searchable information on multiple topics.  Www.medlineplus.gov : medication info, interactive tutorials, watch real surgeries online  Www.familydoctor.org : good info from the Academy of Family Physicians  Www.cdc.gov : public health info, travel advisories, epidemics (H1N1)  Www.aap.org : children's health info, normal development, vaccinations  Www.health.[facility].[place].us : [place] dept of health, public health issues in [place], N1N1    Your care team:                            Family Medicine Internal Medicine   MD Cr Singh MD Shantel Branch-Fleming, MD Katya Georgiev PA-C Nam Ho, MD Pediatrics   MARELY Sandoval, MD Neetu Gonzalez CNP, MD Deborah Mielke, MD Kim Thein, APRN CNP      Clinic hours: Monday - Thursday 7 am-7 pm; Fridays 7 am-5 pm.   Urgent care: Monday - Friday 11 am-9 pm; Saturday and Sunday 9 am-5 pm.  Pharmacy : Monday -Thursday 8 am-8 pm; Friday 8 am-6 pm; Saturday and Sunday 9 am-5 pm.     Clinic: [phone]   Pharmacy: [phone]

## 2019-05-21 NOTE — PROGRESS NOTES
Subjective     Ivonne Gerard is a 83 year old female who presents to clinic today for the following health issues:    HPI   Hypertension Follow-up      Do you check your blood pressure regularly outside of the clinic? Yes     Are you following a low salt diet? Yes    Are your blood pressures ever more than 140 on the top number (systolic) OR more   than 90 on the bottom number (diastolic), for example 140/90? No  Depression and Anxiety Follow-Up    How are you doing with your depression since your last visit? No change    How are you doing with your anxiety since your last visit?  No change    Are you having other symptoms that might be associated with depression or anxiety? No    Have you had a significant life event? No     Do you have any concerns with your use of alcohol or other drugs? No    Social History     Tobacco Use     Smoking status: Former Smoker     Packs/day: 1.00     Years: 30.00     Pack years: 30.00     Types: Cigarettes     Last attempt to quit: 2/10/1986     Years since quittin.2     Smokeless tobacco: Never Used     Tobacco comment: quit 20 yrs   Substance Use Topics     Alcohol use: Yes     Comment: once a month      Drug use: No     PHQ 2016   PHQ-9 Total Score 1 1 1   Q9: Thoughts of better off dead/self-harm past 2 weeks Not at all Not at all Not at all     DAVID-7 SCORE 2012 3/7/2016   Total Score 3 -   Total Score - 0         Suicide Assessment Five-step Evaluation and Treatment (SAFE-T)    Amount of exercise or physical activity: None    Problems taking medications regularly: No    Medication side effects: none    Diet: low salt and low fat/cholesterol      {  Patient Active Problem List   Diagnosis     Obesity     Cataract     Anxiety associated with depression     Arthritis     Osteopenia     Allergic rhinitis     Advanced directives, counseling/discussion     Recurrent UTI     Postmenopausal atrophic vaginitis     Major depression in complete remission  (H)     Lumbar spinal stenosis     Wrist fracture     Scoliosis associated with other condition     Postlaminectomy syndrome     H/O total hip arthroplasty     Major depression, recurrent (H)     Osteoporosis     Infected hardware in left leg, initial encounter (H)     Essential hypertension with goal blood pressure less than 140/90     Gastroesophageal reflux disease, esophagitis presence not specified     Fracture of ankle     Cellulitis of lower extremity     Positive FIT (fecal immunochemical test)     Anemia, unspecified type     Past Surgical History:   Procedure Laterality Date     COLONOSCOPY  2012    Procedure: COLONOSCOPY;  COLONOSCOPY SCREEN/ FVMG JDS;  Surgeon: Xiang Nielsen MD;  Location: [facility] OR     COLONOSCOPY WITH CO2 INSUFFLATION N/A 10/11/2016    Procedure: COLONOSCOPY WITH CO2 INSUFFLATION;  Surgeon: Lynne Hansen MD;  Location: [facility] OR     FOOT SURGERY Left End of 2016    Hardware removal     SURGICAL HISTORY OF -       right total hip     SURGICAL HISTORY OF -       repair perforated left ear drum     SURGICAL HISTORY OF -       discectomy lumbar       Social History     Tobacco Use     Smoking status: Former Smoker     Packs/day: 1.00     Years: 30.00     Pack years: 30.00     Types: Cigarettes     Last attempt to quit: 2/10/1986     Years since quittin.2     Smokeless tobacco: Never Used     Tobacco comment: quit 20 yrs   Substance Use Topics     Alcohol use: Yes     Comment: once a month      Family History   Problem Relation Age of Onset     Allergies Mother         asthma     Cancer - colorectal Mother      Breast Cancer Mother      Alzheimer Disease Mother         dementia     Coronary Artery Disease Brother         stents     Cancer - colorectal Other         cousins on mothers side     Hyperlipidemia No family hx of      Hypertension No family hx of            Reviewed and updated as needed this visit by Provider         Review of Systems  "  ROS COMP: Constitutional, HEENT, cardiovascular, pulmonary, GI, , musculoskeletal, neuro, skin, endocrine and psych systems are negative, except as otherwise noted.      Objective    /69 (BP Location: Left arm, Patient Position: Chair, Cuff Size: Adult Large)   Pulse 74   Temp 98  F (36.7  C) (Oral)   Resp 16   Ht 1.575 m (5' 2\")   Wt 85.3 kg (188 lb)   SpO2 95%   BMI 34.39 kg/m    Body mass index is 34.39 kg/m .  Physical Exam   GENERAL: healthy, alert and no distress  NECK: no adenopathy, no asymmetry, masses, or scars and thyroid normal to palpation  RESP: lungs clear to auscultation - no rales, rhonchi or wheezes  CV: regular rate and rhythm, normal S1 S2, no S3 or S4, no murmur, click or rub, no peripheral edema and peripheral pulses strong  ABDOMEN: soft, nontender, no hepatosplenomegaly, no masses and bowel sounds normal  MS: no gross musculoskeletal defects noted, no edema    Diagnostic Test Results:  Labs reviewed in Epic        Assessment & Plan     1. Essential hypertension with goal blood pressure less than 140/90  Controlled. Reviewed low salt diet.  - propranolol ER (INDERAL LA) 60 MG 24 hr capsule; TAKE 1 CAPSULE BY MOUTH EVERY DAY  Dispense: 90 capsule; Refill: 3  - Basic metabolic panel    2. Spinal stenosis of lumbar region without neurogenic claudication  Patient stated gabapentin not helping. Wean off gabapentin. Trial Lyrica.  - pregabalin (LYRICA) 50 MG capsule; Take 1 capsule (50 mg) by mouth 3 times daily  Dispense: 180 capsule; Refill: 3  - HYDROcodone-acetaminophen (NORCO) 5-325 MG tablet; Take 1-2 tablets by mouth 3 times daily as needed for pain  Dispense: 80 tablet; Refill: 0    3. Cervical stenosis of spinal canal  As above.  - pregabalin (LYRICA) 50 MG capsule; Take 1 capsule (50 mg) by mouth 3 times daily  Dispense: 180 capsule; Refill: 3  - HYDROcodone-acetaminophen (NORCO) 5-325 MG tablet; Take 1-2 tablets by mouth 3 times daily as needed for pain  Dispense: 80 " "tablet; Refill: 0     BMI:   Estimated body mass index is 34.39 kg/m  as calculated from the following:    Height as of this encounter: 1.575 m (5' 2\").    Weight as of this encounter: 85.3 kg (188 lb).           See Patient Instructions    No follow-ups on file.    Steven Salinas MD, MD  [facility]    "

## 2019-06-06 ENCOUNTER — TRANSFERRED RECORDS (OUTPATIENT)
Dept: HEALTH INFORMATION MANAGEMENT | Facility: CLINIC | Age: 84
End: 2019-06-06

## 2019-06-19 ENCOUNTER — OFFICE VISIT (OUTPATIENT)
Dept: FAMILY MEDICINE | Facility: CLINIC | Age: 84
End: 2019-06-19
Payer: MEDICARE

## 2019-06-19 VITALS
WEIGHT: 184 LBS | RESPIRATION RATE: 18 BRPM | SYSTOLIC BLOOD PRESSURE: 109 MMHG | DIASTOLIC BLOOD PRESSURE: 71 MMHG | HEIGHT: 62 IN | BODY MASS INDEX: 33.86 KG/M2 | TEMPERATURE: 98.2 F | OXYGEN SATURATION: 94 % | HEART RATE: 79 BPM

## 2019-06-19 DIAGNOSIS — M25.521 PAIN IN JOINT, UPPER ARM, RIGHT: Primary | ICD-10-CM

## 2019-06-19 PROCEDURE — 99214 OFFICE O/P EST MOD 30 MIN: CPT | Performed by: FAMILY MEDICINE

## 2019-06-19 ASSESSMENT — PAIN SCALES - GENERAL: PAINLEVEL: MODERATE PAIN (5)

## 2019-06-19 ASSESSMENT — MIFFLIN-ST. JEOR: SCORE: 1242.87

## 2019-06-19 NOTE — PROGRESS NOTES
Subjective     Ivonne Gerard is a 83 year old female who presents to clinic today for the following health issues:    HPI   Joint Pain    Onset: one month    Description:   Location: right arm  Character: Sharp and Stabbing    Intensity: moderate    Progression of Symptoms: same    Accompanying Signs & Symptoms:  Other symptoms: none, right side numbness, patient states numbness is from stroke    History:   Previous similar pain: no       Precipitating factors:   Trauma or overuse: YES    Alleviating factors:  Improved by: nothing    Therapies Tried and outcome: diclofenac tab and cream- helps        Patient Active Problem List   Diagnosis     Obesity     Cataract     Anxiety associated with depression     Arthritis     Osteopenia     Allergic rhinitis     Advanced directives, counseling/discussion     Recurrent UTI     Postmenopausal atrophic vaginitis     Major depression in complete remission (H)     Lumbar spinal stenosis     Wrist fracture     Scoliosis associated with other condition     Postlaminectomy syndrome     H/O total hip arthroplasty     Major depression, recurrent (H)     Osteoporosis     Infected hardware in left leg, initial encounter (H)     Essential hypertension with goal blood pressure less than 140/90     Gastroesophageal reflux disease, esophagitis presence not specified     Fracture of ankle     Cellulitis of lower extremity     Positive FIT (fecal immunochemical test)     Anemia, unspecified type     Past Surgical History:   Procedure Laterality Date     COLONOSCOPY  9/25/2012    Procedure: COLONOSCOPY;  COLONOSCOPY SCREEN/ FVMG JDS;  Surgeon: Xiang Nielsen MD;  Location: [facility]     COLONOSCOPY WITH CO2 INSUFFLATION N/A 10/11/2016    Procedure: COLONOSCOPY WITH CO2 INSUFFLATION;  Surgeon: Lynne Hansen MD;  Location: [facility] OR     FOOT SURGERY Left End of July 2016    Hardware removal     SURGICAL HISTORY OF -   1994    right total hip     SURGICAL HISTORY OF -   9/98  "   repair perforated left ear drum     SURGICAL HISTORY OF -       discectomy lumbar       Social History     Tobacco Use     Smoking status: Former Smoker     Packs/day: 1.00     Years: 30.00     Pack years: 30.00     Types: Cigarettes     Last attempt to quit: 2/10/1986     Years since quittin.3     Smokeless tobacco: Never Used     Tobacco comment: quit 20 yrs   Substance Use Topics     Alcohol use: Yes     Comment: once a month      Family History   Problem Relation Age of Onset     Allergies Mother         asthma     Cancer - colorectal Mother      Breast Cancer Mother      Alzheimer Disease Mother         dementia     Coronary Artery Disease Brother         stents     Cancer - colorectal Other         cousins on mothers side     Hyperlipidemia No family hx of      Hypertension No family hx of            Reviewed and updated as needed this visit by Provider         Review of Systems   ROS COMP: Constitutional, HEENT, cardiovascular, pulmonary, GI, , musculoskeletal, neuro, skin, endocrine and psych systems are negative, except as otherwise noted.      Objective    /71 (BP Location: Left arm, Patient Position: Chair, Cuff Size: Adult Large)   Pulse 79   Temp 98.2  F (36.8  C) (Oral)   Resp 18   Ht 1.575 m (5' 2\")   Wt 83.5 kg (184 lb)   SpO2 94%   BMI 33.65 kg/m    Body mass index is 33.65 kg/m .  Physical Exam   GENERAL: healthy, alert and no distress  NECK: no adenopathy, no asymmetry, masses, or scars and thyroid normal to palpation  RESP: lungs clear to auscultation - no rales, rhonchi or wheezes  CV: regular rate and rhythm, normal S1 S2, no S3 or S4, no murmur, click or rub, no peripheral edema and peripheral pulses strong  ABDOMEN: soft, nontender, no hepatosplenomegaly, no masses and bowel sounds normal  MS: mild tenderness along proximal right bicep tendon, decrease right shoulder ROM  Diagnostic Test Results:  Labs reviewed in Epic        Assessment & Plan     1. Pain in joint, " "upper arm, right  Likely tendonitis with decreased shoulder ROM. Referred to physical therapy for evaluation and treatment. Can trial topical nsaids as needed.  - diclofenac (VOLTAREN) 1 % topical gel; Place 4 g onto the skin 4 times daily  Dispense: 100 g; Refill: 11  - [facility] REFERRAL; Future     BMI:   Estimated body mass index is 33.65 kg/m  as calculated from the following:    Height as of this encounter: 1.575 m (5' 2\").    Weight as of this encounter: 83.5 kg (184 lb).           Regular exercise  See Patient Instructions    Return in about 2 weeks (around 7/3/2019) for as needed.    Steven Salinas MD, MD  [facility]      "

## 2019-06-19 NOTE — PATIENT INSTRUCTIONS
At [facility], we strive to deliver an exceptional experience to you, every time we see you.  If you receive a survey in the mail, please send us back your thoughts. We really do value your feedback.    Based on your medical history, these are the current health maintenance/preventive care services that you are due for (some may have been done at this visit.)  Health Maintenance Due   Topic Date Due     ZOSTER IMMUNIZATION (1 of 2) 10/02/1985     ADVANCE CARE PLANNING  03/13/2017     MEDICARE ANNUAL WELLNESS VISIT  07/26/2017     PHQ-9  10/26/2018         Suggested websites for health information:  Www.[employer] : Up to date and easily searchable information on multiple topics.  Www.medlineplus.gov : medication info, interactive tutorials, watch real surgeries online  Www.familydoctor.org : good info from the Academy of Family Physicians  Www.cdc.gov : public health info, travel advisories, epidemics (H1N1)  Www.aap.org : children's health info, normal development, vaccinations  Www.health.[facility].[place].us : [place] dept of health, public health issues in [place], N1N1    Your care team:                            Family Medicine Internal Medicine   MD Cr Singh MD Shantel Branch-Fleming, MD Katya Georgiev PA-C Nam Ho, MD Pediatrics   MARELY Sandoval, MD Neetu Gonzalez CNP, MD Deborah Mielke, MD Kim Thein, APRN CNP      Clinic hours: Monday - Thursday 7 am-7 pm; Fridays 7 am-5 pm.   Urgent care: Monday - Friday 11 am-9 pm; Saturday and Sunday 9 am-5 pm.  Pharmacy : Monday -Thursday 8 am-8 pm; Friday 8 am-6 pm; Saturday and Sunday 9 am-5 pm.     Clinic: [phone]   Pharmacy: [phone]

## 2019-07-01 ENCOUNTER — THERAPY VISIT (OUTPATIENT)
Dept: PHYSICAL THERAPY | Facility: CLINIC | Age: 84
End: 2019-07-01
Attending: FAMILY MEDICINE
Payer: MEDICARE

## 2019-07-01 DIAGNOSIS — M25.511 RIGHT SHOULDER PAIN: ICD-10-CM

## 2019-07-01 DIAGNOSIS — M25.521 PAIN IN JOINT, UPPER ARM, RIGHT: ICD-10-CM

## 2019-07-01 PROCEDURE — 97110 THERAPEUTIC EXERCISES: CPT | Mod: GP | Performed by: PHYSICAL THERAPIST

## 2019-07-01 PROCEDURE — 97161 PT EVAL LOW COMPLEX 20 MIN: CPT | Mod: GP | Performed by: PHYSICAL THERAPIST

## 2019-07-01 NOTE — LETTER
DEPARTMENT OF HEALTH AND HUMAN SERVICES  CENTERS FOR MEDICARE & MEDICAID SERVICES    PLAN/UPDATED PLAN OF PROGRESS FOR OUTPATIENT REHABILITATION    PATIENTS NAME:  Ivonne Gerard   : 1935  PROVIDER NUMBER:    [id number]  HICN:[id number]   PROVIDER NAME: [employer] [facility] JAN STEIN  MEDICAL RECORD NUMBER: [id number]   START OF CARE DATE:  SOC Date: 19   TYPE:  PT  PRIMARY/TREATMENT DIAGNOSIS: (Pertinent Medical Diagnosis)  Pain in joint, upper arm, right  Right shoulder pain  VISITS FROM START OF CARE:  Rxs Used: 1     [facility] Initial Evaluation  Subjective:  The history is provided by the patient. No  was used.   Type of problem:  Right shoulder (right handed)   Condition occurred with:  Other. This is a new condition   Problem details: Patient reports that she noticed onset of right shoulder pain about 6 weeks ago when she was reaching for something and felt something snap or click in the right shoulder. She saw the MD on 19 and noticed onset of bruising on 19 in the right upper arm for no reason that she can think of. She had a TIA in 2018 and she fell and hit her head on cement block wall and had paralysis in all of her body following that. She had surgery and did improve, but has had weakness in the R arm and R leg since that time when compared to the L. .   Patient reports pain:  Lateral. Radiates to:  Upper arm (lateral upper arm). Associated symptoms:  Numbness (has had more numbness in the R hand before the onset of R shld pain). Symptoms are exacerbated by lifting, using arm at shoulder level, using arm overhead, other, lying on extremity and using arm behind back (can't use R arm to put shirt on over her head now., can't reach 1st  shelf of cupboard now, 8/10 pain to try to write) and relieved by rest (ointment that MD gave her).  Ivonne Gerard being seen for R shoulder pain.   Problem began 2019. Where  condition occurred: at home.Problem occurred: reaching  and reported as 6/10 on pain scale. General health as reported by patient is fair. Pertinent medical history includes:  Anemia, depression, implanted device, migraines/headaches, numbness/tingling and overweight.   Other medical allergies details: sulfa.  Surgeries include:  Orthopedic surgery. Other surgery history details: hip and back .  Current medications:  Anti-depressants, anti-inflammatory, high blood pressure medication and pain medication.     Pain is described as shooting and aching (constant ache and intermittent shooting) and is constant. Pain is worse in the A.M.. Since onset symptoms are gradually improving.      Patient is retired.   Barriers include:  None as reported by patient.  Red flags:  None as reported by patient.        Objective:  System  Shoulder Evaluation:  ROM:  AROM:    Flexion:  Left:  130    Right:  60  Abduction:  Left: 100   Right:  60  External Rotation:  Left:  60 in stand with elbow at side    Right:  30 with elbow at side in stand  PROM:    Flexion:  Left:  150    Right: 105    Internal Rotation:  Right:  70  External Rotation:  Right:  45    Strength:    Flexion: Left:5/5    Pain: -    Right: 2+/5      Pain:  +  Extension:  Left: 5/5     Pain:-    Right: 4/5     Pain:-  Abduction:  Left: 5-/5   Pain:-    Right: 2/5      Pain:++  Internal Rotation:  Left:5/5      Pain:-    Right: 4/5      Pain:-/+  External Rotation:   Left:5-/5      Pain:-   Right:4-/5      Pain:+    Stability Testing:  normal    Palpation:  Palpation assessed shoulder: significant bruising noted R upper arm to elbow.  Mobility Tests:  not assessed  Assessment/Plan:    Patient is a 83 year old female with right side shoulder complaints.    Patient has the following significant findings with corresponding treatment plan.                Diagnosis 1:  R shoulder pain  Pain -  hot/cold therapy, US, manual therapy, self management, education, directional  preference exercise and home program  Decreased ROM/flexibility - manual therapy, therapeutic exercise and home program  Decreased strength - therapeutic exercise, therapeutic activities and home program  Decreased proprioception - neuro re-education, therapeutic activities and home program  Impaired muscle performance - neuro re-education and home program  Decreased function - therapeutic activities and home program    Therapy Evaluation Codes:   1) History comprised of:   Personal factors that impact the plan of care:      prior TIA with right sided weakness.    Comorbidity factors that impact the plan of care are:      None.     Medications impacting care: None.  2) Examination of Body Systems comprised of:   Body structures and functions that impact the plan of care:      Shoulder.   Activity limitations that impact the plan of care are:      Bathing, Cooking, Dressing, Lifting and reaching, writing.  3) Clinical presentation characteristics are:   Stable/Uncomplicated.  4) Decision-Making    Low complexity using standardized patient assessment instrument and/or measureable assessment of functional outcome.  Cumulative Therapy Evaluation is: Low complexity.    Previous and current functional limitations:  (See Goal Flow Sheet for this information)    Short term and Long term goals: (See Goal Flow Sheet for this information)     Communication ability:  Patient appears to be able to clearly communicate and understand verbal and written communication and follow directions correctly.  Treatment Explanation - The following has been discussed with the patient:   RX ordered/plan of care  Anticipated outcomes  Possible risks and side effects  This patient would benefit from PT intervention to resume normal activities.   Rehab potential is good.    Frequency:  1 X week, once daily  Duration:  for 12 weeks  Discharge Plan:  Achieve all LTG.  Independent in home treatment program.  Reach maximal therapeutic  "benefit.    Please refer to the daily flowsheet for treatment today, total treatment time and time spent performing 1:1 timed codes.     Caregiver Signature/Credentials _____________________________________________ Date ________        Catrina Francois PT   I have reviewed and certified the need for these services and plan of treatment while under my care.        PHYSICIAN'S SIGNATURE:   ______________________________________________  Date___________     Steven Salinas MD    Certification period:  Beginning of Cert date period: 07/01/19 to  End of Cert period date: 09/26/19   Functional Level Progress Report: Please see attached \"Goal Flow sheet for Functional level.\"  ____X____ Continue Services or       ________ DC Services              Service dates: From  SOC Date: 07/01/19 date to present                         "

## 2019-07-01 NOTE — PROGRESS NOTES
[facility] Initial Evaluation  Subjective:  The history is provided by the patient. No  was used.   Type of problem:  Right shoulder (right handed)   Condition occurred with:  Other. This is a new condition   Problem details: Patient reports that she noticed onset of right shoulder pain about 6 weeks ago when she was reaching for something and felt something snap or click in the right shoulder. She saw the MD on 6-19-19 and noticed onset of bruising on 6-28-19 in the right upper arm for no reason that she can think of. 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 She had surgery and did improve, but has had weakness in the R arm and R leg since that time when compared to the L. .   Patient reports pain:  Lateral. Radiates to:  Upper arm (lateral upper arm). Associated symptoms:  Numbness (has had more numbness in the R hand before the onset of R shld pain). Symptoms are exacerbated by lifting, using arm at shoulder level, using arm overhead, other, lying on extremity and using arm behind back (can't use R arm to put shirt on over her head now., can't reach 1st  shelf of cupboard now, 8/10 pain to try to write) and relieved by rest (ointment that MD gave her).    Ivonne Gerard being seen for R shoulder pain.   Problem began 6/19/2019. Where condition occurred: at home.Problem occurred: reaching  and reported as 6/10 on pain scale. General health as reported by patient is fair. Pertinent medical history includes:  Anemia, depression, implanted device, migraines/headaches, numbness/tingling and overweight.   Other medical allergies details: sulfa.  Surgeries include:  Orthopedic surgery. Other surgery history details: hip and back .  Current medications:  Anti-depressants, anti-inflammatory, high blood pressure medication and pain medication.     Pain is described as shooting and aching  (constant ache and intermittent shooting) and is constant. Pain is worse in the A.M.. Since onset symptoms are gradually improving.      Patient is retired.   Barriers include:  None as reported by patient.  Red flags:  None as reported by patient.                      Objective:  System                   Shoulder Evaluation:  ROM:  AROM:    Flexion:  Left:  130    Right:  60    Abduction:  Left: 100   Right:  60      External Rotation:  Left:  60 in stand with elbow at side    Right:  30 with elbow at side in stand                PROM:    Flexion:  Left:  150    Right: 105          Internal Rotation:  Right:  70  External Rotation:  Right:  45                    Strength:    Flexion: Left:5/5    Pain: -    Right: 2+/5      Pain:  +  Extension:  Left: 5/5     Pain:-    Right: 4/5     Pain:-  Abduction:  Left: 5-/5   Pain:-    Right: 2/5      Pain:++    Internal Rotation:  Left:5/5      Pain:-    Right: 4/5      Pain:-/+  External Rotation:   Left:5-/5      Pain:-   Right:4-/5      Pain:+            Stability Testing:  normal        Palpation:  Palpation assessed shoulder: significant bruising noted R upper arm to elbow.      Mobility Tests:  not assessed                                                 General     ROS    Assessment/Plan:    Patient is a 83 year old female with right side shoulder complaints.    Patient has the following significant findings with corresponding treatment plan.                Diagnosis 1:  R shoulder pain  Pain -  hot/cold therapy, US, manual therapy, self management, education, directional preference exercise and home program  Decreased ROM/flexibility - manual therapy, therapeutic exercise and home program  Decreased strength - therapeutic exercise, therapeutic activities and home program  Decreased proprioception - neuro re-education, therapeutic activities and home program  Impaired muscle performance - neuro re-education and home program  Decreased function - therapeutic activities  and home program    Therapy Evaluation Codes:   1) History comprised of:   Personal factors that impact the plan of care:      prior TIA with right sided weakness.    Comorbidity factors that impact the plan of care are:      None.     Medications impacting care: None.  2) Examination of Body Systems comprised of:   Body structures and functions that impact the plan of care:      Shoulder.   Activity limitations that impact the plan of care are:      Bathing, Cooking, Dressing, Lifting and reaching, writing.  3) Clinical presentation characteristics are:   Stable/Uncomplicated.  4) Decision-Making    Low complexity using standardized patient assessment instrument and/or measureable assessment of functional outcome.  Cumulative Therapy Evaluation is: Low complexity.    Previous and current functional limitations:  (See Goal Flow Sheet for this information)    Short term and Long term goals: (See Goal Flow Sheet for this information)     Communication ability:  Patient appears to be able to clearly communicate and understand verbal and written communication and follow directions correctly.  Treatment Explanation - The following has been discussed with the patient:   RX ordered/plan of care  Anticipated outcomes  Possible risks and side effects  This patient would benefit from PT intervention to resume normal activities.   Rehab potential is good.    Frequency:  1 X week, once daily  Duration:  for 12 weeks  Discharge Plan:  Achieve all LTG.  Independent in home treatment program.  Reach maximal therapeutic benefit.    Please refer to the daily flowsheet for treatment today, total treatment time and time spent performing 1:1 timed codes.

## 2019-07-08 ENCOUNTER — THERAPY VISIT (OUTPATIENT)
Dept: PHYSICAL THERAPY | Facility: CLINIC | Age: 84
End: 2019-07-08
Attending: FAMILY MEDICINE
Payer: MEDICARE

## 2019-07-08 DIAGNOSIS — M25.511 RIGHT SHOULDER PAIN: ICD-10-CM

## 2019-07-08 PROCEDURE — 97140 MANUAL THERAPY 1/> REGIONS: CPT | Mod: GP | Performed by: PHYSICAL THERAPIST

## 2019-07-08 PROCEDURE — 97110 THERAPEUTIC EXERCISES: CPT | Mod: GP | Performed by: PHYSICAL THERAPIST

## 2019-07-08 NOTE — PROGRESS NOTES
SUBJECTIVE  Subjective changes as noted by pt:  Patient feels like she is getting better in that she has better range and better strength.      Current Pain level: 3/10   Changes in function:  Yes (See Goal flowsheet attached for changes in current functional level)     Adverse reaction to treatment or activity:  None    OBJECTIVE  Changes in objective findings:  None        ASSESSMENT  Ivonne continues to require intervention to meet STG and LTG's: PT  Patient is progressing as expected.  Response to therapy has shown an improvement in  function  Progress made towards STG/LTG?  Yes (See Goal flowsheet attached for updates on achievement of STG and LTG)    PLAN  Current treatment program is being advanced to more complex exercises.  The following procedures have been added:  manual therapy    PTA/ATC plan:  N/A    Please refer to the daily flowsheet for treatment today, total treatment time and time spent performing 1:1 timed codes.

## 2019-07-10 DIAGNOSIS — M48.02 CERVICAL STENOSIS OF SPINAL CANAL: ICD-10-CM

## 2019-07-10 DIAGNOSIS — M48.061 SPINAL STENOSIS OF LUMBAR REGION WITHOUT NEUROGENIC CLAUDICATION: ICD-10-CM

## 2019-07-10 NOTE — TELEPHONE ENCOUNTER
Requested Prescriptions   Pending Prescriptions Disp Refills     HYDROcodone-acetaminophen (NORCO) 5-325 MG tablet 80 tablet 0     Sig: Take 1-2 tablets by mouth 3 times daily as needed for pain       There is no refill protocol information for this order              Last Written Prescription Date:  5/21/19  Last Fill Quantity: 80,   # refills: 0  Last Office Visit: 6/19/19  Future Office visit:       Routing refill request to provider for review/approval because:  Drug not on the [facility], [facility] or [facility] refill protocol or controlled substance            Rj Faarax  [facility]

## 2019-07-11 RX ORDER — HYDROCODONE BITARTRATE AND ACETAMINOPHEN 5; 325 MG/1; MG/1
1-2 TABLET ORAL 3 TIMES DAILY PRN
Qty: 80 TABLET | Refills: 0 | Status: SHIPPED | OUTPATIENT
Start: 2019-07-11 | End: 2019-08-16

## 2019-07-11 NOTE — TELEPHONE ENCOUNTER
Controlled Substance Refill Request for Norco  Problem List Complete:  No     PROVIDER TO CONSIDER COMPLETION OF PROBLEM LIST AND OVERVIEW/CONTROLLED SUBSTANCE AGREEMENT    Last Written Prescription Date:  5/21/19  Last Fill Quantity: 80 tablets   # refills: 0    Last Office Visit with [facility] primary care provider: 6/19/19    Future Office visit: none    Controlled substance agreement:   Encounter-Level CSA:    There are no encounter-level csa.     Patient-Level CSA:    There are no patient-level csa.         Last Urine Drug Screen: No results found for: CDAUT, No results found for: COMDAT, No results found for: THC13, PCP13, COC13, MAMP13, OPI13, AMP13, BZO13, TCA13, MTD13, BAR13, OXY13, PPX13, BUP13     Processing:  Staff will hand deliver Rx to on-site pharmacy     [email or web address]       checked in past 3 months?  No-problem list incomplete so  not checked.         Oxana Edwards RN, BSN, PHN

## 2019-07-15 ENCOUNTER — THERAPY VISIT (OUTPATIENT)
Dept: PHYSICAL THERAPY | Facility: CLINIC | Age: 84
End: 2019-07-15
Attending: FAMILY MEDICINE
Payer: MEDICARE

## 2019-07-15 DIAGNOSIS — M25.511 RIGHT SHOULDER PAIN: ICD-10-CM

## 2019-07-15 PROCEDURE — 97110 THERAPEUTIC EXERCISES: CPT | Mod: GP | Performed by: PHYSICAL THERAPIST

## 2019-07-15 PROCEDURE — 97140 MANUAL THERAPY 1/> REGIONS: CPT | Mod: GP | Performed by: PHYSICAL THERAPIST

## 2019-07-15 NOTE — PROGRESS NOTES
SUBJECTIVE  Subjective changes as noted by pt:  Patient reports that she had been feeling better until yesterday when she tried to lift her right arm up to the side and felt a quite significant increase in her right shoulder pain. She states she quit doing the arm out to the side stretch on the table as it was hurting.       Current Pain level: 8/10   Changes in function:  Yes (See Goal flowsheet attached for changes in current functional level)     Adverse reaction to treatment or activity:  None    OBJECTIVE  Changes in objective findings:  Supine PROM R shoulder: flexion 125 and ER  78.. Patient actively abducting arm, technique corrected, and then patient had no pain.         ASSESSMENT  Ivonne continues to require intervention to meet STG and LTG's: PT  Patient is progressing as expected.  Response to therapy has shown an improvement in  ROM   Progress made towards STG/LTG?  Yes (See Goal flowsheet attached for updates on achievement of STG and LTG)    PLAN  Current treatment program is being advanced to more complex exercises.    PTA/ATC plan:  N/A    Please refer to the daily flowsheet for treatment today, total treatment time and time spent performing 1:1 timed codes.

## 2019-07-31 ENCOUNTER — TRANSFERRED RECORDS (OUTPATIENT)
Dept: HEALTH INFORMATION MANAGEMENT | Facility: CLINIC | Age: 84
End: 2019-07-31

## 2019-08-01 ENCOUNTER — THERAPY VISIT (OUTPATIENT)
Dept: PHYSICAL THERAPY | Facility: CLINIC | Age: 84
End: 2019-08-01
Payer: MEDICARE

## 2019-08-01 DIAGNOSIS — M25.511 RIGHT SHOULDER PAIN: ICD-10-CM

## 2019-08-01 PROCEDURE — 97110 THERAPEUTIC EXERCISES: CPT | Mod: GP | Performed by: PHYSICAL THERAPIST

## 2019-08-01 PROCEDURE — 97140 MANUAL THERAPY 1/> REGIONS: CPT | Mod: GP | Performed by: PHYSICAL THERAPIST

## 2019-08-01 NOTE — PROGRESS NOTES
SUBJECTIVE  Subjective changes as noted by pt:  Patient reports that she fell off her chair to the R about 2 weeks ago and injured her R hand and ribs. She is not sure if she broke her ribs or not. Her shoulder doesn't seem to be any worse because of it, but she couldn't do her exercises very well.   Current pain level: 3/10     Changes in function:  Yes (See Goal flowsheet attached for changes in current functional level)     Adverse reaction to treatment or activity:  None    OBJECTIVE  Changes in objective findings:  Supine R shoulder PROM: ER 70 and flexion 140. Tightness R UT and with cervical active rotations and SB, jd to the L.         ASSESSMENT  Ivonne continues to require intervention to meet STG and LTG's: PT  Patient is progressing as expected.  Response to therapy has shown an improvement in  ROM   Progress made towards STG/LTG?  Yes (See Goal flowsheet attached for updates on achievement of STG and LTG)    PLAN  Current treatment program is being advanced to more complex exercises.    PTA/ATC plan:  N/A    Please refer to the daily flowsheet for treatment today, total treatment time and time spent performing 1:1 timed codes.

## 2019-08-05 ENCOUNTER — THERAPY VISIT (OUTPATIENT)
Dept: PHYSICAL THERAPY | Facility: CLINIC | Age: 84
End: 2019-08-05
Payer: MEDICARE

## 2019-08-05 DIAGNOSIS — M25.511 RIGHT SHOULDER PAIN: ICD-10-CM

## 2019-08-05 PROCEDURE — 97140 MANUAL THERAPY 1/> REGIONS: CPT | Mod: GP | Performed by: PHYSICAL THERAPIST

## 2019-08-05 PROCEDURE — 97110 THERAPEUTIC EXERCISES: CPT | Mod: GP | Performed by: PHYSICAL THERAPIST

## 2019-08-05 NOTE — PROGRESS NOTES
Subjective:  HPI                    Objective:  System    Physical Exam    General     ROS    Assessment/Plan:    DISCHARGE REPORT    Progress reporting period is from 7-1-19 to 8-5-19.       SUBJECTIVE  Subjective changes noted by patient:  Patient reports that she feels her right shoulder is back to her normal, but feels her ROM is actually better than before. She plans to continue her HEP as she hopes to get stronger.        Current Pain level: 0/10.     Previous pain level was  6/10  .   Changes in function:  Yes (See Goal flowsheet attached for changes in current functional level)  Adverse reaction to treatment or activity: None    OBJECTIVE  Changes noted in objective findings:  Standing R shoulder AROM: flexion 90, abduction 75, and ER with elbow at side 50. Supine PROM R shoulder: flexion 132 and ER 59. No real change in strength from initial evaluation.         ASSESSMENT/PLAN  Updated problem list and treatment plan: Diagnosis 1:  R shoulder pain  Pain -  manual therapy, education, directional preference exercise and home program  Decreased ROM/flexibility - manual therapy, therapeutic exercise and home program  Decreased strength - therapeutic exercise, therapeutic activities and home program  Decreased proprioception - neuro re-education, therapeutic activities and home program  Impaired muscle performance - neuro re-education and home program  Decreased function - therapeutic activities and home program  STG/LTGs have been met or progress has been made towards goals:  Yes (See Goal flow sheet completed today.)  Assessment of Progress: The patient's condition is improving.  The patient's condition has potential to improve.  The patient has met all of their long term goals.  Self Management Plans:  Patient is independent in a home treatment program.  Patient is independent in self management of symptoms.    Ivonne continues to require the following intervention to meet STG and LTG's:  PT intervention is no  longer required to meet STG/LTG.    Recommendations:  This patient is ready to be discharged from therapy and continue their home treatment program.    Please refer to the daily flowsheet for treatment today, total treatment time and time spent performing 1:1 timed codes.

## 2019-08-06 PROBLEM — M25.511 RIGHT SHOULDER PAIN: Status: RESOLVED | Noted: 2019-07-08 | Resolved: 2019-08-06

## 2019-08-16 DIAGNOSIS — M48.02 CERVICAL STENOSIS OF SPINAL CANAL: ICD-10-CM

## 2019-08-16 DIAGNOSIS — M48.061 SPINAL STENOSIS OF LUMBAR REGION WITHOUT NEUROGENIC CLAUDICATION: ICD-10-CM

## 2019-08-16 RX ORDER — HYDROCODONE BITARTRATE AND ACETAMINOPHEN 5; 325 MG/1; MG/1
1-2 TABLET ORAL 3 TIMES DAILY PRN
Qty: 80 TABLET | Refills: 0 | Status: SHIPPED | OUTPATIENT
Start: 2019-08-16 | End: 2019-10-01

## 2019-09-04 DIAGNOSIS — M48.02 CERVICAL STENOSIS OF SPINAL CANAL: ICD-10-CM

## 2019-09-04 RX ORDER — GABAPENTIN 600 MG/1
600 TABLET, FILM COATED ORAL 3 TIMES DAILY
Qty: 270 TABLET | Refills: 3 | Status: SHIPPED | OUTPATIENT
Start: 2019-09-04 | End: 2019-09-06

## 2019-09-04 NOTE — TELEPHONE ENCOUNTER
Ok with this with discontinuing the Lyrica and restarting the gabapentin. Please let patient know that gabapentin was refilled.

## 2019-09-04 NOTE — TELEPHONE ENCOUNTER
Reason for call:  Medication   If this is a refill request, has the caller requested the refill from the pharmacy already? No  Will the patient be using a [facility]? no  Name of the pharmacy and phone number for the current request: [facility]    Name of the medication requested: Gabapentin    Other request: patient would like to stop her Lyrica and go back to the previous medication she used (gabapentin)    Phone number to reach patient:  [phone]    Best Time:  any    Can we leave a detailed message on this number?  YES

## 2019-09-06 ENCOUNTER — TELEPHONE (OUTPATIENT)
Dept: FAMILY MEDICINE | Facility: CLINIC | Age: 84
End: 2019-09-06

## 2019-09-06 DIAGNOSIS — M48.02 CERVICAL STENOSIS OF SPINAL CANAL: ICD-10-CM

## 2019-09-06 RX ORDER — GABAPENTIN 600 MG/1
600 TABLET ORAL 3 TIMES DAILY
Qty: 270 TABLET | Refills: 3 | Status: SHIPPED | OUTPATIENT
Start: 2019-09-06 | End: 2020-08-04

## 2019-09-06 NOTE — TELEPHONE ENCOUNTER
Faxed request from [employer]:    Insurance won't pay for gabapentin (GRALISE) 600 MG 24 hr tablet.    Only would [place] regular Gabapentin 600.    Please call or authorize new rx for this if okay?    Thanks

## 2019-09-26 DIAGNOSIS — M48.02 CERVICAL STENOSIS OF SPINAL CANAL: ICD-10-CM

## 2019-09-26 DIAGNOSIS — M48.061 SPINAL STENOSIS OF LUMBAR REGION WITHOUT NEUROGENIC CLAUDICATION: ICD-10-CM

## 2019-09-26 NOTE — TELEPHONE ENCOUNTER
Requested Prescriptions   Pending Prescriptions Disp Refills     HYDROcodone-acetaminophen (NORCO) 5-325 MG tablet        Last Written Prescription Date:  08/16/19  Last Fill Quantity: 80,   # refills: 0  Last Office Visit: 06/19/19-  Future Office visit:       Routing refill request to provider for review/approval because:  Drug not on the [facility], [facility] or [facility] refill protocol or controlled substance 80 tablet 0     Sig: Take 1-2 tablets by mouth 3 times daily as needed for pain       There is no refill protocol information for this order        No  found

## 2019-10-01 RX ORDER — HYDROCODONE BITARTRATE AND ACETAMINOPHEN 5; 325 MG/1; MG/1
1-2 TABLET ORAL 3 TIMES DAILY PRN
Qty: 80 TABLET | Refills: 0 | Status: SHIPPED | OUTPATIENT
Start: 2019-10-01 | End: 2019-11-07

## 2019-10-01 NOTE — TELEPHONE ENCOUNTER
Controlled Substance Refill Request for Norco  Problem List Complete:  No      PROVIDER TO CONSIDER COMPLETION OF PROBLEM LIST AND OVERVIEW/CONTROLLED SUBSTANCE AGREEMENT     Last Written Prescription Date:  8/16/19  Last Fill Quantity: 80,   # refills: 0     THE MOST RECENT OFFICE VISIT MUST BE WITHIN THE PAST 3 MONTHS. AT LEAST ONE FACE TO FACE VISIT MUST OCCUR EVERY 6 MONTHS. ADDITIONAL VISITS CAN BE VIRTUAL.  (THIS STATEMENT SHOULD BE DELETED.)     Last Office Visit with [facility] primary care provider: 5/21/19     Future Office visit: None       Controlled substance agreement:   Encounter-Level CSA:    There are no encounter-level csa.      Patient-Level CSA:    There are no patient-level csa.            Last Urine Drug Screen: No results found for: CDAUT, No results found for: COMDAT, No results found for: THC13, PCP13, COC13, MAMP13, OPI13, AMP13, BZO13, TCA13, MTD13, BAR13, OXY13, PPX13, BUP13     Processing:  Patient will  in clinic      [email or web address]         checked in past 3 months?  No-problem list incomplete so  not checked.          Oxana Edwards RN, BSN, PHN

## 2019-10-30 ENCOUNTER — ALLIED HEALTH/NURSE VISIT (OUTPATIENT)
Dept: NURSING | Facility: CLINIC | Age: 84
End: 2019-10-30
Payer: MEDICARE

## 2019-10-30 DIAGNOSIS — Z23 NEED FOR PROPHYLACTIC VACCINATION AND INOCULATION AGAINST INFLUENZA: Primary | ICD-10-CM

## 2019-10-30 PROCEDURE — G0008 ADMIN INFLUENZA VIRUS VAC: HCPCS

## 2019-10-30 PROCEDURE — 99207 ZZC NO CHARGE NURSE ONLY: CPT

## 2019-10-30 PROCEDURE — 90662 IIV NO PRSV INCREASED AG IM: CPT

## 2019-11-04 DIAGNOSIS — E78.5 HYPERLIPIDEMIA LDL GOAL <130: ICD-10-CM

## 2019-11-04 NOTE — TELEPHONE ENCOUNTER
"Requested Prescriptions   Pending Prescriptions Disp Refills     atorvastatin (LIPITOR) 10 MG tablet [Pharmacy Med Name: ATORVASTATIN 10 MG TABLET]  Last Written Prescription Date:  11/20/18  Last Fill Quantity: 90,  # refills: 3   Last Office Visit with [facility], [facility] or [facility] prescribing provider:  06/19/19-   Future Office Visit:    90 tablet 3     Sig: TAKE 1 TABLET BY MOUTH DAILY       Statins Protocol Failed - 11/4/2019  2:20 AM        Failed - LDL on file in past 12 months     Recent Labs   Lab Test 07/26/16  1351   LDL 77             Passed - No abnormal creatine kinase in past 12 months     No lab results found.             Passed - Recent (12 mo) or future (30 days) visit within the authorizing provider's specialty     Patient has had an office visit with the authorizing provider or a provider within the authorizing providers department within the previous 12 mos or has a future within next 30 days. See \"Patient Info\" tab in inbasket, or \"Choose Columns\" in Meds & Orders section of the refill encounter.              Passed - Medication is active on med list        Passed - Patient is age 18 or older        Passed - No active pregnancy on record        Passed - No positive pregnancy test in past 12 months          "

## 2019-11-05 RX ORDER — ATORVASTATIN CALCIUM 10 MG/1
TABLET, FILM COATED ORAL
Qty: 90 TABLET | Refills: 1 | Status: SHIPPED | OUTPATIENT
Start: 2019-11-05 | End: 2020-05-07

## 2019-11-05 NOTE — TELEPHONE ENCOUNTER
Prescription approved per [facility] Refill Protocol.  Elo Elder RN  [facility]

## 2019-11-07 DIAGNOSIS — M48.061 SPINAL STENOSIS OF LUMBAR REGION WITHOUT NEUROGENIC CLAUDICATION: ICD-10-CM

## 2019-11-07 DIAGNOSIS — M48.02 CERVICAL STENOSIS OF SPINAL CANAL: ICD-10-CM

## 2019-11-07 NOTE — TELEPHONE ENCOUNTER
Requested Prescriptions   Pending Prescriptions Disp Refills     HYDROcodone-acetaminophen (NORCO) 5-325 MG tablet [Pharmacy Med Name: HYDROCODONE/APAP 5-325MG TAB]        Last Written Prescription Date:  10/01/19  Last Fill Quantity: 80,   # refills: 0  Last Office Visit: 06/19/19-Brad  Future Office visit:       Routing refill request to provider for review/approval because:  Drug not on the [facility], [facility] or  [facility] refill protocol or controlled substance 80 tablet 0     Sig: TAKE 1 TO 2 TABLETS BY MOUTH 3 TIMES DAILY AS NEEDED FOR PAIN       There is no refill protocol information for this order        No  found

## 2019-11-08 RX ORDER — HYDROCODONE BITARTRATE AND ACETAMINOPHEN 5; 325 MG/1; MG/1
TABLET ORAL
Qty: 80 TABLET | Refills: 0 | Status: SHIPPED | OUTPATIENT
Start: 2019-11-08 | End: 2020-01-22

## 2019-11-08 NOTE — TELEPHONE ENCOUNTER
Controlled Substance Refill Request for Norco  Problem List Complete:  No     PROVIDER TO CONSIDER COMPLETION OF PROBLEM LIST AND OVERVIEW/CONTROLLED SUBSTANCE AGREEMENT    Last Written Prescription Date:  10/1/19  Last Fill Quantity: 80 tablets   # refills: 0    THE MOST RECENT OFFICE VISIT MUST BE WITHIN THE PAST 3 MONTHS. AT LEAST ONE FACE TO FACE VISIT MUST OCCUR EVERY 6 MONTHS. ADDITIONAL VISITS CAN BE VIRTUAL.  (THIS STATEMENT SHOULD BE DELETED.)    [facility] Office Visit with [facility] care provider: 6/19/19    Future Office visit: None    Controlled substance agreement:   Encounter-Level CSA:    There are no encounter-level csa.     Patient-Level CSA:    There are no patient-level csa.         Last Urine Drug Screen: No results found for: CDAUT, No results found for: COMDAT, No results found for: THC13, PCP13, COC13, MAMP13, OPI13, AMP13, BZO13, TCA13, MTD13, BAR13, OXY13, PPX13, BUP13     Processing:  Rx to be electronically transmitted to pharmacy by provider      [email or web address]       checked in past 3 months?  No-problem list incomplete so  not checked.           Oxana Edwards RN, BSN, PHN

## 2019-12-19 ENCOUNTER — NURSE TRIAGE (OUTPATIENT)
Dept: FAMILY MEDICINE | Facility: CLINIC | Age: 84
End: 2019-12-19

## 2019-12-19 NOTE — TELEPHONE ENCOUNTER
Reason for Call:  Other Head Injury    Detailed comments: Pt calling for she fell yesterday and hit the back of her head on ice and was transferred to Triage.    Phone Number Patient can be reached at: Home number on file [phone] (home)    Best Time: anytime    Can we leave a detailed message on this number? YES    Call taken on 12/19/2019 at 11:10 AM by Everett Kirk

## 2019-12-19 NOTE — TELEPHONE ENCOUNTER
"Son and patient calling on speaker phone. She resides at [facility] and their protocol said to do 911 to ER. Patient cannot move/bend right knee and is in intense pain upon bearing weight. This computer triage states patient should come to office, now. We discussed the services and differences of UC versus ED. Son will discuss further with patient on plan and has no further questions or needs at this time.       Additional Information    Negative: Sounds like a life-threatening emergency to the triager    Negative: Followed a knee injury    Negative: Swollen knee joint and fever    Negative: Thigh or calf pain and only 1 side and present > 1 hour    Negative: Thigh or calf swelling and only 1 side    Negative: Patient sounds very sick or weak to the triager    Can't move swollen joint at all    Answer Assessment - Initial Assessment Questions  1. LOCATION and RADIATION: \"Where is the pain located?\"       Right knee pain.   2. QUALITY: \"What does the pain feel like?\"  (e.g., sharp, dull, aching, burning)      It feels warm/hot and pain is severe when she bears weight.   3. SEVERITY: \"How bad is the pain?\" \"What does it keep you from doing?\"   (Scale 1-10; or mild, moderate, severe)    -  MILD (1-3): doesn't interfere with normal activities     -  MODERATE (4-7): interferes with normal activities (e.g., work or school) or awakens from sleep, limping     -  SEVERE (8-10): excruciating pain, unable to do any normal activities, unable to walk      Severe   4. ONSET: \"When did the pain start?\" \"Does it come and go, or is it there all the time?\"      Patient has pain if she tries to bend it or put weight on it.   5. RECURRENT: \"Have you had this pain before?\" If so, ask: \"When, and what happened then?\"      No  6. SETTING: \"Has there been any recent work, exercise or other activity that involved that part of the body?\"       Fall on ice  7. AGGRAVATING FACTORS: \"What makes the knee pain worse?\" (e.g., " "walking, climbing stairs, running)      Bending or bearing weight.   8. ASSOCIATED SYMPTOMS: \"Is there any swelling or redness of the knee?\"      Yes, swelling and scraped.   9. OTHER SYMPTOMS: \"Do you have any other symptoms?\" (e.g., chest pain, difficulty breathing, fever, calf pain)      Denies  10. PREGNANCY: \"Is there any chance you are pregnant?\" \"When was your last menstrual period?\"        N/A    Protocols used: KNEE PAIN-A-[place]    "

## 2019-12-30 ENCOUNTER — TELEPHONE (OUTPATIENT)
Dept: FAMILY MEDICINE | Facility: CLINIC | Age: 84
End: 2019-12-30

## 2019-12-30 NOTE — TELEPHONE ENCOUNTER
Reason for Call:  Home Health Care    Skilled Nursing: delay for all care until 12/31 per client request: OT,PT,Skilled Nursing, Home Health Aide.    Pt Provider: Brad    Phone Number Homecare Nurse can be reached at: [facility] [phone]    Can we leave a detailed message on this number? YES    Best Time: any    Call taken on 12/30/2019 at 3:21 PM by Dinorah Hassan

## 2019-12-31 ENCOUNTER — TELEPHONE (OUTPATIENT)
Dept: FAMILY MEDICINE | Facility: CLINIC | Age: 84
End: 2019-12-31

## 2020-01-02 NOTE — TELEPHONE ENCOUNTER
Patient can continue with the Voltaren oral 75 mg po BID or the Voltaren topical 4 g four times daily.    Steven Salinas MD    
Patient is taking oral Voltaren now per Bridget. Updated medication list to reflect this change.     Oxana Edwards RN, BSN, PHN    
Reason for Call:  Other prescription    Detailed comments: Homecare RN calling for she would like  to know the frequency as to how much Pt should be taken voltaren.     Phone Number RN can be reached at: Other phone number:  [phone]    Best Time: anytime    Can we leave a detailed message on this number? YES    Call taken on 12/31/2019 at 1:12 PM by Everett Kirk            
Routing to provider to review and advise.   Recent ER visits does not note Voltaren as a continued medication. Please advise.   Elo Elder RN  [facility]        
145

## 2020-01-03 ENCOUNTER — TRANSFERRED RECORDS (OUTPATIENT)
Dept: HEALTH INFORMATION MANAGEMENT | Facility: CLINIC | Age: 85
End: 2020-01-03

## 2020-01-03 ENCOUNTER — TELEPHONE (OUTPATIENT)
Dept: FAMILY MEDICINE | Facility: CLINIC | Age: 85
End: 2020-01-03

## 2020-01-03 NOTE — TELEPHONE ENCOUNTER
Reason for Call:  Home Health Care    PT: 1x week for 5 weeks to work on gait, balance, tranfers and strength    Pt Provider: Brad    Phone Natalie Homecare Nurse can be reached at: [facility] [phone]    Can we leave a detailed message on this number? YES    Best Time: any    Call taken on 1/3/2020 at 2:10 PM by Dinorah Hassan

## 2020-01-06 DIAGNOSIS — M48.061 SPINAL STENOSIS OF LUMBAR REGION WITHOUT NEUROGENIC CLAUDICATION: Primary | ICD-10-CM

## 2020-01-06 NOTE — TELEPHONE ENCOUNTER
Requested Prescriptions   Pending Prescriptions Disp Refills     calcium carbonate-vitamin D (CALCIUM 500/D) 500-200 MG-UNIT tablet 60 tablet 1     Sig: Take 2 tablets by mouth 2 times daily (with meals)       There is no refill protocol information for this order        Routing refill request to provider for review/approval because:  Drug not active on patient's medication list  Medication is reported/historical        Oxana Edwards RN, BSN, PHN

## 2020-01-06 NOTE — TELEPHONE ENCOUNTER
Patient is requesting new rx with refills for Calcium 500mg-vitamin D 200IU, 1qd, for bone health.    Thanks

## 2020-01-10 DIAGNOSIS — Z53.9 DIAGNOSIS NOT YET DEFINED: Primary | ICD-10-CM

## 2020-01-10 PROCEDURE — G0180 MD CERTIFICATION HHA PATIENT: HCPCS | Performed by: FAMILY MEDICINE

## 2020-01-22 ENCOUNTER — OFFICE VISIT (OUTPATIENT)
Dept: FAMILY MEDICINE | Facility: CLINIC | Age: 85
End: 2020-01-22
Payer: MEDICARE

## 2020-01-22 VITALS
TEMPERATURE: 98.7 F | RESPIRATION RATE: 18 BRPM | OXYGEN SATURATION: 94 % | HEART RATE: 80 BPM | SYSTOLIC BLOOD PRESSURE: 117 MMHG | DIASTOLIC BLOOD PRESSURE: 78 MMHG

## 2020-01-22 DIAGNOSIS — M48.02 CERVICAL STENOSIS OF SPINAL CANAL: ICD-10-CM

## 2020-01-22 DIAGNOSIS — M48.061 SPINAL STENOSIS OF LUMBAR REGION WITHOUT NEUROGENIC CLAUDICATION: ICD-10-CM

## 2020-01-22 DIAGNOSIS — S82.001A CLOSED NONDISPLACED FRACTURE OF RIGHT PATELLA, UNSPECIFIED FRACTURE MORPHOLOGY, INITIAL ENCOUNTER: Primary | ICD-10-CM

## 2020-01-22 PROCEDURE — 99213 OFFICE O/P EST LOW 20 MIN: CPT | Performed by: FAMILY MEDICINE

## 2020-01-22 RX ORDER — HYDROCODONE BITARTRATE AND ACETAMINOPHEN 5; 325 MG/1; MG/1
TABLET ORAL
Qty: 80 TABLET | Refills: 0 | Status: SHIPPED | OUTPATIENT
Start: 2020-01-22 | End: 2020-03-09

## 2020-01-22 ASSESSMENT — PATIENT HEALTH QUESTIONNAIRE - PHQ9: SUM OF ALL RESPONSES TO PHQ QUESTIONS 1-9: 0

## 2020-01-22 ASSESSMENT — PAIN SCALES - GENERAL: PAINLEVEL: NO PAIN (0)

## 2020-01-22 NOTE — PROGRESS NOTES
Subjective     Ivonne Gerard is a 84 year old female who presents to clinic today for the following health issues:    HPI       Hospital Follow-up Visit:    Hospital/Nursing Home/IP Rehab Facility: [facility]   Date of Admission: 12/19/2020  Date of Discharge: 12/20/2020  Reason(s) for Admission: fall, broke knee, toe            Problems taking medications regularly:  None       Medication changes since discharge: None       Problems adhering to non-medication therapy:  None    Summary of hospitalization:  CareEverywhere information obtained and reviewed  Diagnostic Tests/Treatments reviewed.  Follow up needed: none  Other Healthcare Providers Involved in Patient s Care:         None  Update since discharge: improved.     Post Discharge Medication Reconciliation: discharge medications reconciled, continue medications without change.  Plan of care communicated with patient     Coding guidelines for this visit:  Type of Medical   Decision Making Face-to-Face Visit       within 7 Days of discharge Face-to-Face Visit        within 14 days of discharge   Moderate Complexity [id number]   High Complexity [id number]              Patient Active Problem List   Diagnosis     Obesity     Cataract     Anxiety associated with depression     Arthritis     Osteopenia     Allergic rhinitis     Advanced directives, counseling/discussion     Recurrent UTI     Postmenopausal atrophic vaginitis     Major depression in complete remission (H)     Lumbar spinal stenosis     Wrist fracture     Scoliosis associated with other condition     Postlaminectomy syndrome     H/O total hip arthroplasty     Major depression, recurrent (H)     Osteoporosis     Infected hardware in left leg, initial encounter (H)     Essential hypertension with goal blood pressure less than 140/90     Gastroesophageal reflux disease, esophagitis presence not specified     Fracture of ankle     Cellulitis of lower extremity     Positive FIT (fecal immunochemical  test)     Anemia, unspecified type     Past Surgical History:   Procedure Laterality Date     COLONOSCOPY  2012    Procedure: COLONOSCOPY;  COLONOSCOPY SCREEN/ FVMG JDS;  Surgeon: Xiang Nielsen MD;  Location: [facility] OR     COLONOSCOPY WITH CO2 INSUFFLATION N/A 10/11/2016    Procedure: COLONOSCOPY WITH CO2 INSUFFLATION;  Surgeon: Lynne Hansen MD;  Location: [facility] OR     FOOT SURGERY Left End of 2016    Hardware removal     SURGICAL HISTORY OF -       right total hip     SURGICAL HISTORY OF     repair perforated left ear drum     SURGICAL HISTORY OF     discectomy lumbar       Social History     Tobacco Use     Smoking status: Former Smoker     Packs/day: 1.00     Years: 30.00     Pack years: 30.00     Types: Cigarettes     Last attempt to quit: 2/10/1986     Years since quittin.9     Smokeless tobacco: Never Used     Tobacco comment: quit 20 yrs   Substance Use Topics     Alcohol use: Yes     Comment: once a month      Family History   Problem Relation Age of Onset     Allergies Mother         asthma     Cancer - colorectal Mother      Breast Cancer Mother      Alzheimer Disease Mother         dementia     Coronary Artery Disease Brother         stents     Cancer - colorectal Other         cousins on mothers side     Hyperlipidemia No family hx of      Hypertension No family hx of          Current Outpatient Medications   Medication Sig Dispense Refill     aspirin 81 MG tablet Take 1 tablet (81 mg) by mouth daily 90 tablet 3     atorvastatin (LIPITOR) 10 MG tablet TAKE 1 TABLET BY MOUTH DAILY 90 tablet 1     calcium carbonate-vitamin D (OYSTER SHELL CALCIUM/D) 500-200 MG-UNIT tablet Take 1 tablet by mouth daily 90 tablet 3     diclofenac (VOLTAREN) 75 MG EC tablet Take 1 tablet (75 mg) by mouth 2 times daily 180 tablet 3     DULoxetine (CYMBALTA) 30 MG capsule Take 30 mg by mouth daily       gabapentin (NEURONTIN) 600 MG tablet Take 1 tablet (600 mg) by mouth 3  times daily 270 tablet 3     HYDROcodone-acetaminophen (NORCO) 5-325 MG tablet TAKE 1 TO 2 TABLETS BY MOUTH 3 TIMES DAILY AS NEEDED FOR PAIN 80 tablet 0     IMODIUM A-D 2 MG PO TABS 1 TABLET AS NEEDED       methocarbamol (ROBAXIN) 500 MG tablet Take 1.5 tablets (750 mg) by mouth 4 times daily as needed for muscle spasms 120 tablet 3     omeprazole (PRILOSEC) 20 MG DR capsule TAKE 1 CAPSULE BY MOUTH EVERY DAY 90 capsule 3     propranolol ER (INDERAL LA) 60 MG 24 hr capsule TAKE 1 CAPSULE BY MOUTH EVERY DAY 90 capsule 3     sertraline (ZOLOFT) 50 MG tablet Take 1 tablet (50 mg) by mouth daily 90 tablet 3     triamcinolone (KENALOG) 0.1 % ointment Apply sparingly to affected area three times daily for 14 days. 80 g 3     Allergies   Allergen Reactions     Sulfa Drugs Hives     BP Readings from Last 3 Encounters:   01/22/20 117/78   06/19/19 109/71   05/21/19 116/69    Wt Readings from Last 3 Encounters:   06/19/19 83.5 kg (184 lb)   05/21/19 85.3 kg (188 lb)   04/26/18 85.7 kg (189 lb)               Reviewed and updated as needed this visit by Provider         Review of Systems   ROS COMP: Constitutional, HEENT, cardiovascular, pulmonary, GI, , musculoskeletal, neuro, skin, endocrine and psych systems are negative, except as otherwise noted.      Objective    /78 (BP Location: Left arm, Patient Position: Sitting, Cuff Size: Adult Large)   Pulse 80   Temp 98.7  F (37.1  C) (Oral)   Resp 18   LMP  (LMP Unknown)   SpO2 94%   Breastfeeding No   There is no height or weight on file to calculate BMI.  Physical Exam   GENERAL: healthy, alert and no distress  NECK: no adenopathy, no asymmetry, masses, or scars and thyroid normal to palpation  RESP: lungs clear to auscultation - no rales, rhonchi or wheezes  CV: regular rate and rhythm, normal S1 S2, no S3 or S4, no murmur, click or rub, no peripheral edema and peripheral pulses strong  ABDOMEN: soft, nontender, no hepatosplenomegaly, no masses and bowel sounds  "normal  MS: no gross musculoskeletal defects noted, no edema    Diagnostic Test Results:  Labs reviewed in Epic        Assessment & Plan     1. Closed nondisplaced fracture of right patella, unspecified fracture morphology, initial encounter  Following Orthopedics. Patient doing well.     2. Spinal stenosis of lumbar region without neurogenic claudication  Needed refills.  - HYDROcodone-acetaminophen (NORCO) 5-325 MG tablet; TAKE 1 TO 2 TABLETS BY MOUTH 3 TIMES DAILY AS NEEDED FOR PAIN  Dispense: 80 tablet; Refill: 0    3. Cervical stenosis of spinal canal  Needed refills.  - HYDROcodone-acetaminophen (NORCO) 5-325 MG tablet; TAKE 1 TO 2 TABLETS BY MOUTH 3 TIMES DAILY AS NEEDED FOR PAIN  Dispense: 80 tablet; Refill: 0     BMI:   Estimated body mass index is 33.65 kg/m  as calculated from the following:    Height as of 6/19/19: 1.575 m (5' 2\").    Weight as of 6/19/19: 83.5 kg (184 lb).           See Patient Instructions    Return in about 6 months (around 7/22/2020) for Physical Exam.    Steven Salinas MD, MD  [facility]      "

## 2020-01-25 DIAGNOSIS — K21.9 GASTROESOPHAGEAL REFLUX DISEASE, ESOPHAGITIS PRESENCE NOT SPECIFIED: ICD-10-CM

## 2020-01-25 NOTE — TELEPHONE ENCOUNTER
"Requested Prescriptions   Pending Prescriptions Disp Refills     omeprazole (PRILOSEC) 20 MG DR capsule [Pharmacy Med Name: OMEPRAZOLE DR 20 MG CAPSULE]  Last Written Prescription Date:  2/8/19  Last Fill Quantity: 90,  # refills: 3   Last Office Visit with G, P or [facility] prescribing provider:  1/22/20   Future Office Visit:      90 capsule 3     Sig: TAKE 1 CAPSULE BY MOUTH EVERY DAY       PPI Protocol Failed - 1/25/2020  9:58 AM        Failed - No diagnosis of osteoporosis on record        Passed - Not on Clopidogrel (unless Pantoprazole ordered)        Passed - Recent (12 mo) or future (30 days) visit within the authorizing provider's specialty     Patient has had an office visit with the authorizing provider or a provider within the authorizing providers department within the previous 12 mos or has a future within next 30 days. See \"Patient Info\" tab in inbasket, or \"Choose Columns\" in Meds & Orders section of the refill encounter.              Passed - Medication is active on med list        Passed - Patient is age 18 or older        Passed - No active pregnacy on record        Passed - No positive pregnancy test in past 12 months          "

## 2020-01-27 ENCOUNTER — MEDICAL CORRESPONDENCE (OUTPATIENT)
Dept: HEALTH INFORMATION MANAGEMENT | Facility: CLINIC | Age: 85
End: 2020-01-27

## 2020-01-28 ENCOUNTER — DOCUMENTATION ONLY (OUTPATIENT)
Dept: OTHER | Facility: CLINIC | Age: 85
End: 2020-01-28

## 2020-01-29 NOTE — TELEPHONE ENCOUNTER
Routing refill request to provider for review/approval because:  Failed - No diagnosis of osteoporosis on record  Elo Elder RN  [facility]

## 2020-01-30 ENCOUNTER — TELEPHONE (OUTPATIENT)
Dept: FAMILY MEDICINE | Facility: CLINIC | Age: 85
End: 2020-01-30

## 2020-01-30 NOTE — TELEPHONE ENCOUNTER
Reason for Call:  Other FFYI    Detailed comments: Ivonne fell out of her wheelchair on 01/28. She was leaning forward. EMS assisted her back into her chair.  Ivonne denies any injury.    Phone Number Patient can be reached at: [phone]    Best Time: any    Can we leave a detailed message on this number? YES    Call taken on 1/30/2020 at 9:42 AM by aRe Dunn

## 2020-01-31 ENCOUNTER — TRANSFERRED RECORDS (OUTPATIENT)
Dept: HEALTH INFORMATION MANAGEMENT | Facility: CLINIC | Age: 85
End: 2020-01-31

## 2020-01-31 ENCOUNTER — TELEPHONE (OUTPATIENT)
Dept: FAMILY MEDICINE | Facility: CLINIC | Age: 85
End: 2020-01-31

## 2020-01-31 NOTE — TELEPHONE ENCOUNTER
Verbal orders:    Jennifer is calling  1x/4w for ongoing PT - range of motion     Please call secure line [phone]

## 2020-02-03 NOTE — TELEPHONE ENCOUNTER
Notified Jennifer miles for home care orders per [facility] protocol via detailed voice message.     Oxana Edwards RN, BSN, PHN

## 2020-02-04 DIAGNOSIS — M48.061 SPINAL STENOSIS OF LUMBAR REGION, UNSPECIFIED WHETHER NEUROGENIC CLAUDICATION PRESENT: ICD-10-CM

## 2020-02-06 NOTE — TELEPHONE ENCOUNTER
"Routing refill request to provider for review/approval because:  Labs not current:  ALT, AST, CBC  Above age limit for refill protocol (6-64 yrs)    Latasha Olivas RN  [facility]        Requested Prescriptions   Pending Prescriptions Disp Refills     diclofenac (VOLTAREN) 75 MG EC tablet [Pharmacy Med Name: DICLOFENAC SOD EC 75 MG TAB] 180 tablet 3     Sig: TAKE 1 TABLET (75 MG) BY MOUTH 2 TIMES DAILY       NSAID Medications Failed - 2/4/2020 10:49 AM        Failed - Normal ALT on file in past 12 months     Recent Labs   Lab Test 07/26/16  1351   ALT 14             Failed - Normal AST on file in past 12 months     Recent Labs   Lab Test 07/26/16  1351   AST 14             Failed - Patient is age 6-64 years        Failed - Normal CBC on file in past 12 months     Recent Labs   Lab Test 10/05/16  1226   WBC 8.9   RBC 3.99   HGB 10.9*   HCT 35.1   *                 Passed - Blood pressure under 140/90 in past 12 months     BP Readings from Last 3 Encounters:   01/22/20 117/78   06/19/19 109/71   05/21/19 116/69                 Passed - Recent (12 mo) or future (30 days) visit within the authorizing provider's specialty     Patient has had an office visit with the authorizing provider or a provider within the authorizing providers department within the previous 12 mos or has a future within next 30 days. See \"Patient Info\" tab in inbasket, or \"Choose Columns\" in Meds & Orders section of the refill encounter.              Passed - Medication is active on med list        Passed - No active pregnancy on record        Passed - Normal serum creatinine on file in past 12 months     Recent Labs   Lab Test 05/21/19  1358   CR 0.82             Passed - No positive pregnancy test in past 12 months          "

## 2020-02-07 RX ORDER — DICLOFENAC SODIUM 75 MG/1
75 TABLET, DELAYED RELEASE ORAL 2 TIMES DAILY
Qty: 180 TABLET | Refills: 3 | Status: SHIPPED | OUTPATIENT
Start: 2020-02-07 | End: 2021-03-02

## 2020-02-18 ENCOUNTER — MEDICAL CORRESPONDENCE (OUTPATIENT)
Dept: HEALTH INFORMATION MANAGEMENT | Facility: CLINIC | Age: 85
End: 2020-02-18

## 2020-03-09 DIAGNOSIS — M48.02 CERVICAL STENOSIS OF SPINAL CANAL: ICD-10-CM

## 2020-03-09 DIAGNOSIS — M48.061 SPINAL STENOSIS OF LUMBAR REGION WITHOUT NEUROGENIC CLAUDICATION: ICD-10-CM

## 2020-03-09 RX ORDER — HYDROCODONE BITARTRATE AND ACETAMINOPHEN 5; 325 MG/1; MG/1
TABLET ORAL
Qty: 80 TABLET | Refills: 0 | Status: SHIPPED | OUTPATIENT
Start: 2020-03-09 | End: 2020-04-17

## 2020-03-09 NOTE — TELEPHONE ENCOUNTER
Controlled Substance Refill Request for Norco  Problem List Complete:  No      PROVIDER TO CONSIDER COMPLETION OF PROBLEM LIST AND OVERVIEW/CONTROLLED SUBSTANCE AGREEMENT     Last Written Prescription Date:  1/22/2020  Last Fill Quantity: 80 tablets   # refills: 0     THE MOST RECENT OFFICE VISIT MUST BE WITHIN THE PAST 3 MONTHS. AT LEAST ONE FACE TO FACE VISIT MUST OCCUR EVERY 6 MONTHS. ADDITIONAL VISITS CAN BE VIRTUAL.  (THIS STATEMENT SHOULD BE DELETED.)     Last Office Visit with [facility] primary care provider: 1/22/2020     Future Office visit: None     Controlled substance agreement:   Encounter-Level CSA:    There are no encounter-level csa.      Patient-Level CSA:    There are no patient-level csa.           Last Urine Drug Screen: No results found for: CDAUT, No results found for: COMDAT, No results found for: THC13, PCP13, COC13, MAMP13, OPI13, AMP13, BZO13, TCA13, MTD13, BAR13, OXY13, PPX13, BUP13     Processing:  Rx to be electronically transmitted to pharmacy by provider       [email or web address]         checked in past 3 months?  No-problem list incomplete so  not checked.               Oxana Edwards RN, BSN, PHN

## 2020-03-09 NOTE — TELEPHONE ENCOUNTER
Reason for Call:  Other prescription    Detailed comments: Patient would like medication refill  HYDROcodone-acetaminophen (NORCO) 5-325 MG tablet please advise patient.    [facility] pharmacy needs it faxed     Phone Number Patient can be reached at: Home number on file [phone] (home)    Best Time: any    Can we leave a detailed message on this number? NO    Call taken on 3/9/2020 at 11:51 AM by Cam Cosby

## 2020-03-15 ENCOUNTER — HEALTH MAINTENANCE LETTER (OUTPATIENT)
Age: 85
End: 2020-03-15

## 2020-03-20 ENCOUNTER — TRANSFERRED RECORDS (OUTPATIENT)
Dept: HEALTH INFORMATION MANAGEMENT | Facility: CLINIC | Age: 85
End: 2020-03-20

## 2020-04-17 DIAGNOSIS — M48.061 SPINAL STENOSIS OF LUMBAR REGION WITHOUT NEUROGENIC CLAUDICATION: ICD-10-CM

## 2020-04-17 DIAGNOSIS — M48.02 CERVICAL STENOSIS OF SPINAL CANAL: ICD-10-CM

## 2020-04-17 RX ORDER — HYDROCODONE BITARTRATE AND ACETAMINOPHEN 5; 325 MG/1; MG/1
TABLET ORAL
Qty: 80 TABLET | Refills: 0 | Status: SHIPPED | OUTPATIENT
Start: 2020-04-17 | End: 2020-05-30

## 2020-04-17 NOTE — TELEPHONE ENCOUNTER
Controlled Substance Refill Request for Norco  Problem List Complete:  No     PROVIDER TO CONSIDER COMPLETION OF PROBLEM LIST AND OVERVIEW/CONTROLLED SUBSTANCE AGREEMENT    Last Written Prescription Date:    HYDROcodone-acetaminophen (NORCO) 5-325 MG tablet  80 tablet  0  3/9/2020   No    Sig: TAKE 1 TO 2 TABLETS BY MOUTH 3 TIMES DAILY AS NEEDED FOR PAIN    Sent to pharmacy as: HYDROcodone-acetaminophen (NORCO) 5-325 MG tablet    Class: E-Prescribe            THE MOST RECENT OFFICE VISIT MUST BE WITHIN THE PAST 3 MONTHS. AT LEAST ONE FACE TO FACE VISIT MUST OCCUR EVERY 6 MONTHS. ADDITIONAL VISITS CAN BE VIRTUAL.  (THIS STATEMENT SHOULD BE DELETED.)    Last Office Visit with [facility] primary care provider: 01/22/2020    Future Office visit:     Controlled substance agreement:   Encounter-Level CSA:    There are no encounter-level csa.     Patient-Level CSA:    There are no patient-level csa.         Last Urine Drug Screen: No results found for: CDAUT, No results found for: COMDAT, No results found for: THC13, PCP13, COC13, MAMP13, OPI13, AMP13, BZO13, TCA13, MTD13, BAR13, OXY13, PPX13, BUP13     Processing:  Rx to be electronically transmitted to pharmacy by provider      [email or web address]       checked in past 3 months?  No, route to RN Problem list not listed.     Catrina Gibbs RN  [place]

## 2020-04-17 NOTE — TELEPHONE ENCOUNTER
Reason for Call:  Other prescription    Detailed comments: Patient needs medication refilled   HYDROcodone-acetaminophen (NORCO) 5-325 MG tablet    Phone Number Patient can be reached at: Cell number on file:    Telephone Information:   Mobile [phone]       Best Time: Any    Can we leave a detailed message on this number? YES    Call taken on 4/17/2020 at 10:43 AM by Cam Cosby

## 2020-05-05 DIAGNOSIS — E78.5 HYPERLIPIDEMIA LDL GOAL <130: ICD-10-CM

## 2020-05-07 RX ORDER — ATORVASTATIN CALCIUM 10 MG/1
TABLET, FILM COATED ORAL
Qty: 90 TABLET | Refills: 1 | Status: SHIPPED | OUTPATIENT
Start: 2020-05-07 | End: 2020-10-27

## 2020-05-07 NOTE — TELEPHONE ENCOUNTER
Routing refill request to provider for review/approval because:  Labs not current:  LDL      Jennifer Seymour RN, [facility] Triage

## 2020-05-15 DIAGNOSIS — I10 ESSENTIAL HYPERTENSION WITH GOAL BLOOD PRESSURE LESS THAN 140/90: ICD-10-CM

## 2020-05-19 RX ORDER — PROPRANOLOL HCL 60 MG
CAPSULE, EXTENDED RELEASE 24HR ORAL
Qty: 90 CAPSULE | Refills: 1 | Status: SHIPPED | OUTPATIENT
Start: 2020-05-19 | End: 2020-12-20

## 2020-05-19 NOTE — TELEPHONE ENCOUNTER
Prescription approved per [facility] Refill Protocol.  Elo Elder RN  [facility]

## 2020-05-27 DIAGNOSIS — M48.061 SPINAL STENOSIS OF LUMBAR REGION WITHOUT NEUROGENIC CLAUDICATION: ICD-10-CM

## 2020-05-27 DIAGNOSIS — M48.02 CERVICAL STENOSIS OF SPINAL CANAL: ICD-10-CM

## 2020-05-27 NOTE — TELEPHONE ENCOUNTER
Reason for call:  Medication   If this is a refill request, has the caller requested the refill from the pharmacy already? No  Will the patient be using a [facility]? No  Name of the pharmacy and phone number for the current request: [facility]    Name of the medication requested: norco    Other request:     Phone number to reach patient:  Home number on file [phone] (home)    Best Time:  any    Can we leave a detailed message on this number?  YES    Travel screening: Not Applicable

## 2020-05-29 ENCOUNTER — TRANSFERRED RECORDS (OUTPATIENT)
Dept: HEALTH INFORMATION MANAGEMENT | Facility: CLINIC | Age: 85
End: 2020-05-29

## 2020-05-29 LAB — INR PPP: 1.1 (ref 1–1.2)

## 2020-05-29 NOTE — TELEPHONE ENCOUNTER
Controlled Substance Refill Request for Norco  Problem List Complete:  No     PROVIDER TO CONSIDER COMPLETION OF PROBLEM LIST AND OVERVIEW/CONTROLLED SUBSTANCE AGREEMENT    Last Written Prescription Date:  4/17/20  Last Fill Quantity: 80 tablets   # refills: 0    THE MOST RECENT OFFICE VISIT MUST BE WITHIN THE PAST 3 MONTHS. AT LEAST ONE FACE TO FACE VISIT MUST OCCUR EVERY 6 MONTHS. ADDITIONAL VISITS CAN BE VIRTUAL.  (THIS STATEMENT SHOULD BE DELETED.)    Last Office Visit with [facility] primary care provider: 1/22/20    Future Office visit: None    Controlled substance agreement:   Encounter-Level CSA:    There are no encounter-level csa.     Patient-Level CSA:    There are no patient-level csa.         Last Urine Drug Screen: No results found for: CDAUT, No results found for: COMDAT, No results found for: THC13, PCP13, COC13, MAMP13, OPI13, AMP13, BZO13, TCA13, MTD13, BAR13, OXY13, PPX13, BUP13     Processing:  Rx to be electronically transmitted to pharmacy by provider      [email or web address]       checked in past 3 months?  No-problem list incomplete so  not checked.           Oxana Edwards RN, BSN, PHN

## 2020-05-30 LAB
CREAT SERPL-MCNC: 0.64 MG/DL (ref 0.55–1.02)
GFR SERPL CREATININE-BSD FRML MDRD: >60 ML/MIN
GLUCOSE SERPL-MCNC: 90 MG/DL (ref 74–106)
POTASSIUM SERPL-SCNC: 4 MMOL/L (ref 3.5–5.1)

## 2020-05-30 RX ORDER — HYDROCODONE BITARTRATE AND ACETAMINOPHEN 5; 325 MG/1; MG/1
TABLET ORAL
Qty: 80 TABLET | Refills: 0 | Status: SHIPPED | OUTPATIENT
Start: 2020-05-30 | End: 2020-07-02

## 2020-06-12 ENCOUNTER — TRANSFERRED RECORDS (OUTPATIENT)
Dept: HEALTH INFORMATION MANAGEMENT | Facility: CLINIC | Age: 85
End: 2020-06-12

## 2020-07-02 ENCOUNTER — VIRTUAL VISIT (OUTPATIENT)
Dept: FAMILY MEDICINE | Facility: CLINIC | Age: 85
End: 2020-07-02
Payer: MEDICARE

## 2020-07-02 VITALS
SYSTOLIC BLOOD PRESSURE: 130 MMHG | DIASTOLIC BLOOD PRESSURE: 85 MMHG | HEIGHT: 65 IN | BODY MASS INDEX: 31.32 KG/M2 | WEIGHT: 188 LBS

## 2020-07-02 DIAGNOSIS — M54.41 ACUTE BILATERAL LOW BACK PAIN WITH RIGHT-SIDED SCIATICA: ICD-10-CM

## 2020-07-02 DIAGNOSIS — S32.050S CLOSED COMPRESSION FRACTURE OF L5 LUMBAR VERTEBRA, SEQUELA: Primary | ICD-10-CM

## 2020-07-02 PROCEDURE — 99442 ZZC PHYSICIAN TELEPHONE EVALUATION 11-20 MIN: CPT | Performed by: FAMILY MEDICINE

## 2020-07-02 RX ORDER — PREDNISONE 20 MG/1
TABLET ORAL
Qty: 20 TABLET | Refills: 0 | Status: SHIPPED | OUTPATIENT
Start: 2020-07-02 | End: 2020-08-04

## 2020-07-02 RX ORDER — OXYCODONE HYDROCHLORIDE 5 MG/1
5 TABLET ORAL EVERY 6 HOURS PRN
Qty: 60 TABLET | Refills: 0 | COMMUNITY
Start: 2020-07-02 | End: 2020-07-20

## 2020-07-02 ASSESSMENT — MIFFLIN-ST. JEOR: SCORE: 1303.64

## 2020-07-02 ASSESSMENT — PAIN SCALES - GENERAL: PAINLEVEL: EXTREME PAIN (8)

## 2020-07-02 NOTE — PROGRESS NOTES
"Ivonne Gerard is a 84 year old female who is being evaluated via a billable telephone visit.      The patient has been notified of following:     \"This telephone visit will be conducted via a call between you and your physician/provider. We have found that certain health care needs can be provided without the need for a physical exam.  This service lets us provide the care you need with a short phone conversation.  If a prescription is necessary we can send it directly to your pharmacy.  If lab work is needed we can place an order for that and you can then stop by our lab to have the test done at a later time.    Telephone visits are billed at different rates depending on your insurance coverage. During this emergency period, for some insurers they may be billed the same as an in-person visit.  Please reach out to your insurance provider with any questions.    If during the course of the call the physician/provider feels a telephone visit is not appropriate, you will not be charged for this service.\"    Patient has given verbal consent for Telephone visit?  Yes    What phone number would you like to be contacted at? [phone]    How would you like to obtain your AVS? Mail a copy    Subjective     Ivonne Gerard is a 84 year old female who presents via phone visit today for the following health issues:    HPI    Hospital Follow-up Visit:    Hospital/Nursing Home/ Rehab Facility:  [facility]   Date of Admission: 5/29/2020  Date of Discharge: 5/30/2020  Reason(s) for Admission: Back pain      Was your hospitalization related to COVID-19? No   Problems taking medications regularly:  None  Medication changes since discharge: Yes   Problems adhering to non-medication therapy:  None    Summary of hospitalization:  CareEverywhere information obtained and reviewed  Diagnostic Tests/Treatments reviewed.  Follow up needed: none  Other Healthcare Providers Involved in Patient s Care:         None  Update " since discharge: improved. Post Discharge Medication Reconciliation: discharge medications reconciled, continue medications without change.  Plan of care communicated with patient        Patient Active Problem List   Diagnosis     Obesity     Cataract     Anxiety associated with depression     Arthritis     Osteopenia     Allergic rhinitis     Recurrent UTI     Postmenopausal atrophic vaginitis     Major depression in complete remission (H)     Lumbar spinal stenosis     Wrist fracture     Scoliosis associated with other condition     Postlaminectomy syndrome     H/O total hip arthroplasty     Major depression, recurrent (H)     Osteoporosis     Infected hardware in left leg, initial encounter (H)     Essential hypertension with goal blood pressure less than 140/90     Gastroesophageal reflux disease, esophagitis presence not specified     Fracture of ankle     Cellulitis of lower extremity     Positive FIT (fecal immunochemical test)     Anemia, unspecified type     Past Surgical History:   Procedure Laterality Date     COLONOSCOPY  2012    Procedure: COLONOSCOPY;  COLONOSCOPY SCREEN/ FVMG JDS;  Surgeon: Xiang Nielsen MD;  Location: [facility] OR     COLONOSCOPY WITH CO2 INSUFFLATION N/A 10/11/2016    Procedure: COLONOSCOPY WITH CO2 INSUFFLATION;  Surgeon: Lynne Hansen MD;  Location: [facility] OR     FOOT SURGERY Left End of 2016    Hardware removal     SURGICAL HISTORY OF -       right total hip     SURGICAL HISTORY OF -       repair perforated left ear drum     SURGICAL HISTORY OF -       discectomy lumbar       Social History     Tobacco Use     Smoking status: Former Smoker     Packs/day: 1.00     Years: 30.00     Pack years: 30.00     Types: Cigarettes     Last attempt to quit: 2/10/1986     Years since quittin.4     Smokeless tobacco: Never Used     Tobacco comment: quit 20 yrs   Substance Use Topics     Alcohol use: Yes     Comment: once a month      Family History    Problem Relation Age of Onset     Allergies Mother         asthma     Cancer - colorectal Mother      Breast Cancer Mother      Alzheimer Disease Mother         dementia     Coronary Artery Disease Brother         stents     Cancer - colorectal Other         cousins on mothers side     Hyperlipidemia No family hx of      Hypertension No family hx of          Current Outpatient Medications   Medication Sig Dispense Refill     atorvastatin (LIPITOR) 10 MG tablet TAKE 1 TABLET BY MOUTH EVERY DAY 90 tablet 1     calcium carbonate-vitamin D (OYSTER SHELL CALCIUM/D) 500-200 MG-UNIT tablet Take 1 tablet by mouth daily 90 tablet 3     diclofenac (VOLTAREN) 75 MG EC tablet TAKE 1 TABLET (75 MG) BY MOUTH 2 TIMES DAILY 180 tablet 3     DULoxetine (CYMBALTA) 30 MG capsule Take 30 mg by mouth daily       gabapentin (NEURONTIN) 600 MG tablet Take 1 tablet (600 mg) by mouth 3 times daily 270 tablet 3     IMODIUM A-D 2 MG PO TABS 1 TABLET AS NEEDED       methocarbamol (ROBAXIN) 500 MG tablet Take 1.5 tablets (750 mg) by mouth 4 times daily as needed for muscle spasms 120 tablet 3     omeprazole (PRILOSEC) 20 MG DR capsule TAKE 1 CAPSULE BY MOUTH EVERY DAY 90 capsule 3     oxyCODONE (ROXICODONE) 5 MG tablet Take 1 tablet (5 mg) by mouth every 6 hours as needed for pain 60 tablet 0     predniSONE (DELTASONE) 20 MG tablet Take 3 tabs by mouth daily x 3 days, then 2 tabs daily x 3 days, then 1 tab daily x 3 days, then 1/2 tab daily x 3 days. 20 tablet 0     propranolol ER (INDERAL LA) 60 MG 24 hr capsule TAKE 1 CAPSULE BY MOUTH EVERY DAY 90 capsule 1     sertraline (ZOLOFT) 50 MG tablet Take 1 tablet (50 mg) by mouth daily 90 tablet 3     triamcinolone (KENALOG) 0.1 % ointment Apply sparingly to affected area three times daily for 14 days. 80 g 3     aspirin 81 MG tablet Take 1 tablet (81 mg) by mouth daily 90 tablet 3     Allergies   Allergen Reactions     Sulfa Drugs Hives     BP Readings from Last 3 Encounters:   07/02/20 130/85    01/22/20 117/78   06/19/19 109/71    Wt Readings from Last 3 Encounters:   07/02/20 85.3 kg (188 lb)   06/19/19 83.5 kg (184 lb)   05/21/19 85.3 kg (188 lb)                    Reviewed and updated as needed this visit by Provider         Review of Systems   Constitutional, HEENT, cardiovascular, pulmonary, GI, , musculoskeletal, neuro, skin, endocrine and psych systems are negative, except as otherwise noted.       Objective   Reported vitals:  LMP  (LMP Unknown)    healthy, alert and no distress  PSYCH: Alert and oriented times 3; coherent speech, normal   rate and volume, able to articulate logical thoughts, able   to abstract reason, no tangential thoughts, no hallucinations   or delusions  Her affect is normal  RESP: No cough, no audible wheezing, able to talk in full sentences  Remainder of exam unable to be completed due to telephone visits    Diagnostic Test Results:  Labs reviewed in Epic        Assessment/Plan:  1. Closed compression fracture of L5 lumbar vertebra, sequela  Continues to have pain but improving per patient. May use oxycodone but will try to wean off.  - oxyCODONE (ROXICODONE) 5 MG tablet; Take 1 tablet (5 mg) by mouth every 6 hours as needed for pain  Dispense: 60 tablet; Refill: 0  - predniSONE (DELTASONE) 20 MG tablet; Take 3 tabs by mouth daily x 3 days, then 2 tabs daily x 3 days, then 1 tab daily x 3 days, then 1/2 tab daily x 3 days.  Dispense: 20 tablet; Refill: 0    2. Acute bilateral low back pain with right-sided sciatica  Treat with oral steroidal tapering dose to see if this helps.  - predniSONE (DELTASONE) 20 MG tablet; Take 3 tabs by mouth daily x 3 days, then 2 tabs daily x 3 days, then 1 tab daily x 3 days, then 1/2 tab daily x 3 days.  Dispense: 20 tablet; Refill: 0    No follow-ups on file.      Phone call duration:  15 minutes    Steven Salinas MD, MD

## 2020-07-06 ENCOUNTER — TELEPHONE (OUTPATIENT)
Dept: FAMILY MEDICINE | Facility: CLINIC | Age: 85
End: 2020-07-06

## 2020-07-06 NOTE — TELEPHONE ENCOUNTER
Reason for Call:  Orders  Detailed comments: Kya from [facility] calling to requesting verbal delayed start of care and care begibg on 7/7/2020. Please call in verbal orders to [phone]    Phone Number Patient can be reached at: Other phone number:  [phone]    Best Time: ANy    Can we leave a detailed message on this number? YES    Call taken on 7/6/2020 at 3:58 PM by Mckenna Walsh

## 2020-07-07 NOTE — TELEPHONE ENCOUNTER
This writer attempted to contact anjelica on 07/07/20      Reason for call orders and left detailed message.      If patient calls back:   Left detailed message informing okay for recommended orders        Radha Reynaga CMA

## 2020-07-08 ENCOUNTER — MEDICAL CORRESPONDENCE (OUTPATIENT)
Dept: HEALTH INFORMATION MANAGEMENT | Facility: CLINIC | Age: 85
End: 2020-07-08

## 2020-07-09 ENCOUNTER — TELEPHONE (OUTPATIENT)
Dept: FAMILY MEDICINE | Facility: CLINIC | Age: 85
End: 2020-07-09

## 2020-07-09 NOTE — TELEPHONE ENCOUNTER
Verbal orders given to approve the requested services below per the 'Home Care, Assisted Living, or Nursing Home Evaluations and Treatments [facility] Policy'.         PT  1x a week for 1 week  2x a week for 2 weeks  1x a week for 1 week  Strengthing, balance, gate training and pain management      Noni Cortes RN

## 2020-07-09 NOTE — TELEPHONE ENCOUNTER
Reason for Call:  Verbal Orders    Detailed comments: Home care would like to request the following verbal orders:    PT  1x a week for 1 week  2x a week for 2 weeks  1x a week for 1 week  Strengthing, balance, gate training and pain management    Phone Number Patient can be reached at: Other phone number: [phone]    Best Time: Any    Can we leave a detailed message on this number? YES    Call taken on 7/9/2020 at 4:41 PM by Tasha Girard

## 2020-07-10 ENCOUNTER — TELEPHONE (OUTPATIENT)
Dept: FAMILY MEDICINE | Facility: CLINIC | Age: 85
End: 2020-07-10

## 2020-07-10 NOTE — TELEPHONE ENCOUNTER
This writer attempted to contact home care on 07/10/20      Reason for call orders and left message.      If patient calls back:   Registered Nurse called. Follow Triage Call workflow        Noni Cortes RN

## 2020-07-10 NOTE — TELEPHONE ENCOUNTER
Spoke with home care and gave her Dr. Salinas's order.          Writer approved home care for OT: 2 times a week for 2 weeks  1 time a week for 1 week  To work on transfers, ADL's , and IADL,     Valentina said patient is doing good since yesterdays fall and is just a little sore.       Noni Cortes RN

## 2020-07-10 NOTE — TELEPHONE ENCOUNTER
Reason for call:  Home Healthcare Reason for Call:  Home Health Care    richie with [facility] called regarding (reason for call): orders    Orders are needed for this patient.     *HHA: to hold this week per client request    OT: 2 times a week for 2 weeks  1 time a week for 1 week  To work on transfers, ADL's , and IADL,     FYI: patient fell last night- hit rt eye, that is bruised  Bruise and swelling on rt toe. She called 911 to help her up      Pt Provider: Steven Salinas    Phone Number Homecare Nurse can be reached at: [phone]    Can we leave a detailed message on this number? YES    Phone number patient can be reached at:     Best Time: any    Call taken on 7/10/2020 at 2:10 PM by Della Oliver      Phone number to reach patient:      Best Time:      Can we leave a detailed message on this number?  yes    Travel screening:

## 2020-07-20 DIAGNOSIS — S32.050S CLOSED COMPRESSION FRACTURE OF L5 LUMBAR VERTEBRA, SEQUELA: ICD-10-CM

## 2020-07-20 RX ORDER — OXYCODONE HYDROCHLORIDE 5 MG/1
5 TABLET ORAL EVERY 6 HOURS PRN
Qty: 60 TABLET | Refills: 0 | Status: SHIPPED | OUTPATIENT
Start: 2020-07-20 | End: 2020-08-18

## 2020-07-20 NOTE — TELEPHONE ENCOUNTER
Reason for Call:  Other prescription    Detailed comments: Patient needs medication to be refilled oxyCODONE (ROXICODONE) 5 MG tablet    FYI The predniSONE (DELTASONE) 20 MG tablet did work for the cyetic nerve on the leg and back     [facility].    PHONE:  [phone]    Phone Number Patient can be reached at: Cell number on file:    Telephone Information:   Mobile [phone]       Best Time: ANY    Can we leave a detailed message on this number? YES    Call taken on 7/20/2020 at 9:59 AM by Cam Cosby

## 2020-07-20 NOTE — TELEPHONE ENCOUNTER
Controlled Substance Refill Request for Oxycodone  Problem List Complete:  No     PROVIDER TO CONSIDER COMPLETION OF PROBLEM LIST AND OVERVIEW/CONTROLLED SUBSTANCE AGREEMENT    Last Written Prescription Date:  7/2/20  Last Fill Quantity: 60 tablets   # refills: 0    THE MOST RECENT OFFICE VISIT MUST BE WITHIN THE PAST 3 MONTHS. AT LEAST ONE FACE TO FACE VISIT MUST OCCUR EVERY 6 MONTHS. ADDITIONAL VISITS CAN BE VIRTUAL.  (THIS STATEMENT SHOULD BE DELETED.)    Last Office Visit with [facility] primary care provider: 7/2/20 Virtual visit    Future Office visit: None    Controlled substance agreement:   Encounter-Level CSA:    There are no encounter-level csa.     Patient-Level CSA:    There are no patient-level csa.         Last Urine Drug Screen: No results found for: CDAUT, No results found for: COMDAT, No results found for: THC13, PCP13, COC13, MAMP13, OPI13, AMP13, BZO13, TCA13, MTD13, BAR13, OXY13, PPX13, BUP13     Processing:  Rx to be electronically transmitted to pharmacy by provider      [email or web address]       checked in past 3 months?  No-problem list incomplete so  not checked.             Oxana Edwards RN, BSN, PHN

## 2020-07-22 ENCOUNTER — TELEPHONE (OUTPATIENT)
Dept: FAMILY MEDICINE | Facility: CLINIC | Age: 85
End: 2020-07-22

## 2020-07-22 NOTE — TELEPHONE ENCOUNTER
This writer attempted to contact [facility] on 07/22/20    Reason for call homecare orders and left detailed message with verbal from provider for nurse.    When patient calls back, please contact 1st floor Cierra Mooney. routine priority.        Duke Maradiaga

## 2020-07-22 NOTE — TELEPHONE ENCOUNTER
Reason for Call:  Other call back    Detailed comments: Home care would like to request the following verbal orders:    Pt has two pressure ulcers on each side of buttock, requesting ok to use barrier cream twice a day and as needed.     Phone Number Patient can be reached at: Other phone number:  [phone]    Best Time: Any    Can we leave a detailed message on this number? YES    Call taken on 7/22/2020 at 12:35 PM by Tasha Girard

## 2020-07-30 ENCOUNTER — TELEPHONE (OUTPATIENT)
Dept: FAMILY MEDICINE | Facility: CLINIC | Age: 85
End: 2020-07-30

## 2020-07-30 NOTE — TELEPHONE ENCOUNTER
Reason for Call: Request for an order or referral: Verbal Orders.    Order or referral being requested: Occupational therapy to be extended for 1 time a week for a week to meet goal.    Date needed: as soon as possible    Has the patient been seen by the PCP for this problem? YES.    Phone number Patient can be reached at:  Other phone number: [phone]    Best Time:  Anytime.    Can we leave a detailed message on this number?  YES    Call taken on 7/30/2020 at 9:59 AM by Brie Burk

## 2020-07-30 NOTE — TELEPHONE ENCOUNTER
Reason for Call: Request for an order or referral:    Order or referral being requested: verbal orders for 1 more home health aide visit     Date needed: by next week    Has the patient been seen by the PCP for this problem? YES    Additional comments:     Phone number Patient can be reached at:  Other phone number:  Valentina @ [phone]    Best Time:  Any    Can we leave a detailed message on this number?  YES    Call taken on 7/30/2020 at 4:42 PM by Mckenna Walsh

## 2020-07-30 NOTE — TELEPHONE ENCOUNTER
Verbal orders given to approve the requested services below per the 'Home Care, Assisted Living, or Nursing Home Evaluations and Treatments [facility] Policy'.         1 home health aide visit next week    Noni Cortes RN

## 2020-08-04 ENCOUNTER — VIRTUAL VISIT (OUTPATIENT)
Dept: FAMILY MEDICINE | Facility: CLINIC | Age: 85
End: 2020-08-04
Payer: MEDICARE

## 2020-08-04 DIAGNOSIS — M48.02 CERVICAL STENOSIS OF SPINAL CANAL: Primary | ICD-10-CM

## 2020-08-04 DIAGNOSIS — M54.16 LUMBAR RADICULOPATHY: ICD-10-CM

## 2020-08-04 DIAGNOSIS — N32.81 OVERACTIVE BLADDER: ICD-10-CM

## 2020-08-04 PROCEDURE — 99442 ZZC PHYSICIAN TELEPHONE EVALUATION 11-20 MIN: CPT | Performed by: FAMILY MEDICINE

## 2020-08-04 RX ORDER — GABAPENTIN 300 MG/1
900 CAPSULE ORAL 3 TIMES DAILY
Qty: 810 CAPSULE | Refills: 3 | Status: SHIPPED | OUTPATIENT
Start: 2020-08-04 | End: 2021-01-07

## 2020-08-04 RX ORDER — OXYBUTYNIN CHLORIDE 5 MG/1
5 TABLET ORAL 2 TIMES DAILY
Qty: 180 TABLET | Refills: 3 | Status: SHIPPED | OUTPATIENT
Start: 2020-08-04 | End: 2021-03-23

## 2020-08-04 ASSESSMENT — ANXIETY QUESTIONNAIRES
5. BEING SO RESTLESS THAT IT IS HARD TO SIT STILL: NOT AT ALL
1. FEELING NERVOUS, ANXIOUS, OR ON EDGE: NOT AT ALL
7. FEELING AFRAID AS IF SOMETHING AWFUL MIGHT HAPPEN: NOT AT ALL
3. WORRYING TOO MUCH ABOUT DIFFERENT THINGS: NOT AT ALL
GAD7 TOTAL SCORE: 3
6. BECOMING EASILY ANNOYED OR IRRITABLE: MORE THAN HALF THE DAYS
2. NOT BEING ABLE TO STOP OR CONTROL WORRYING: NOT AT ALL
IF YOU CHECKED OFF ANY PROBLEMS ON THIS QUESTIONNAIRE, HOW DIFFICULT HAVE THESE PROBLEMS MADE IT FOR YOU TO DO YOUR WORK, TAKE CARE OF THINGS AT HOME, OR GET ALONG WITH OTHER PEOPLE: NOT DIFFICULT AT ALL

## 2020-08-04 ASSESSMENT — PATIENT HEALTH QUESTIONNAIRE - PHQ9
SUM OF ALL RESPONSES TO PHQ QUESTIONS 1-9: 6
5. POOR APPETITE OR OVEREATING: SEVERAL DAYS

## 2020-08-04 NOTE — PROGRESS NOTES
"Ivonne Gerard is a 84 year old female who is being evaluated via a billable telephone visit.      The patient has been notified of following:     \"This telephone visit will be conducted via a call between you and your physician/provider. We have found that certain health care needs can be provided without the need for a physical exam.  This service lets us provide the care you need with a short phone conversation.  If a prescription is necessary we can send it directly to your pharmacy.  If lab work is needed we can place an order for that and you can then stop by our lab to have the test done at a later time.    Telephone visits are billed at different rates depending on your insurance coverage. During this emergency period, for some insurers they may be billed the same as an in-person visit.  Please reach out to your insurance provider with any questions.    If during the course of the call the physician/provider feels a telephone visit is not appropriate, you will not be charged for this service.\"    Patient has given verbal consent for Telephone visit?  Yes    What phone number would you like to be contacted at? [phone]    How would you like to obtain your AVS? Jaylon JOHSI - UNABLE TO DO VIDEO VISIT    Subjective     Ivonne Gerard is a 84 year old female who presents via phone visit today for the following health issues:    HPI    Joint Pain    Onset: ongoing but worsened in last couple months    Description:   Location: left arm  Character: Dull ache    Intensity: 7/10    Progression of Symptoms: same    Accompanying Signs & Symptoms:  Other symptoms: numbness and swelling in right leg    History:   Previous similar pain: YES      Precipitating factors:   Trauma or overuse: fell in June    Alleviating factors:  Improved by: nothing    Therapies Tried and outcome: Rehab    Patient c/o urinary frequency at bedtime every 2-3 hours. No dysuria, fever.       Patient Active Problem List "   Diagnosis     Obesity     Cataract     Anxiety associated with depression     Arthritis     Osteopenia     Allergic rhinitis     Recurrent UTI     Postmenopausal atrophic vaginitis     Major depression in complete remission (H)     Lumbar spinal stenosis     Wrist fracture     Scoliosis associated with other condition     Postlaminectomy syndrome     H/O total hip arthroplasty     Major depression, recurrent (H)     Osteoporosis     Infected hardware in left leg, initial encounter (H)     Essential hypertension with goal blood pressure less than 140/90     Gastroesophageal reflux disease, esophagitis presence not specified     Fracture of ankle     Cellulitis of lower extremity     Positive FIT (fecal immunochemical test)     Anemia, unspecified type     Past Surgical History:   Procedure Laterality Date     COLONOSCOPY  2012    Procedure: COLONOSCOPY;  COLONOSCOPY SCREEN/ FVMG JDS;  Surgeon: Xiang Nielsen MD;  Location: [facility] OR     COLONOSCOPY WITH CO2 INSUFFLATION N/A 10/11/2016    Procedure: COLONOSCOPY WITH CO2 INSUFFLATION;  Surgeon: Lynne Hansen MD;  Location: [facility] OR     FOOT SURGERY Left End of 2016    Hardware removal     SURGICAL HISTORY OF -       right total hip     SURGICAL HISTORY OF -       repair perforated left ear drum     SURGICAL HISTORY OF     discectomy lumbar       Social History     Tobacco Use     Smoking status: Former Smoker     Packs/day: 1.00     Years: 30.00     Pack years: 30.00     Types: Cigarettes     Last attempt to quit: 2/10/1986     Years since quittin.5     Smokeless tobacco: Never Used     Tobacco comment: quit 20 yrs   Substance Use Topics     Alcohol use: Yes     Comment: once a month      Family History   Problem Relation Age of Onset     Allergies Mother         asthma     Cancer - colorectal Mother      Breast Cancer Mother      Alzheimer Disease Mother         dementia     Coronary Artery Disease Brother          stents     Cancer - colorectal Other         cousins on mothers side     Hyperlipidemia No family hx of      Hypertension No family hx of          Current Outpatient Medications   Medication Sig Dispense Refill     aspirin 81 MG tablet Take 1 tablet (81 mg) by mouth daily 90 tablet 3     atorvastatin (LIPITOR) 10 MG tablet TAKE 1 TABLET BY MOUTH EVERY DAY 90 tablet 1     calcium carbonate-vitamin D (OYSTER SHELL CALCIUM/D) 500-200 MG-UNIT tablet Take 1 tablet by mouth daily 90 tablet 3     diclofenac (VOLTAREN) 75 MG EC tablet TAKE 1 TABLET (75 MG) BY MOUTH 2 TIMES DAILY 180 tablet 3     DULoxetine (CYMBALTA) 30 MG capsule Take 30 mg by mouth daily       gabapentin (NEURONTIN) 300 MG capsule Take 3 capsules (900 mg) by mouth 3 times daily 810 capsule 3     IMODIUM A-D 2 MG PO TABS 1 TABLET AS NEEDED       methocarbamol (ROBAXIN) 500 MG tablet Take 1.5 tablets (750 mg) by mouth 4 times daily as needed for muscle spasms 120 tablet 3     omeprazole (PRILOSEC) 20 MG DR capsule TAKE 1 CAPSULE BY MOUTH EVERY DAY 90 capsule 3     oxybutynin (DITROPAN) 5 MG tablet Take 1 tablet (5 mg) by mouth 2 times daily 180 tablet 3     oxyCODONE (ROXICODONE) 5 MG tablet Take 1 tablet (5 mg) by mouth every 6 hours as needed for pain 60 tablet 0     propranolol ER (INDERAL LA) 60 MG 24 hr capsule TAKE 1 CAPSULE BY MOUTH EVERY DAY 90 capsule 1     triamcinolone (KENALOG) 0.1 % ointment Apply sparingly to affected area three times daily for 14 days. 80 g 3     Allergies   Allergen Reactions     Sulfa Drugs Hives     BP Readings from Last 3 Encounters:   07/02/20 130/85   01/22/20 117/78   06/19/19 109/71    Wt Readings from Last 3 Encounters:   07/02/20 85.3 kg (188 lb)   06/19/19 83.5 kg (184 lb)   05/21/19 85.3 kg (188 lb)                    Reviewed and updated as needed this visit by Provider         Review of Systems   Constitutional, HEENT, cardiovascular, pulmonary, GI, , musculoskeletal, neuro, skin, endocrine and psych systems  are negative, except as otherwise noted.       Objective   Reported vitals:  LMP  (LMP Unknown)    healthy, alert and no distress  PSYCH: Alert and oriented times 3; coherent speech, normal   rate and volume, able to articulate logical thoughts, able   to abstract reason, no tangential thoughts, no hallucinations   or delusions  Her affect is normal  RESP: No cough, no audible wheezing, able to talk in full sentences  Remainder of exam unable to be completed due to telephone visits    Diagnostic Test Results:  Labs reviewed in Epic        Assessment/Plan:    1. Cervical stenosis of spinal canal  Increased gabapentin from 600 mg TID to 900 mg TID to hopefully help with symptoms. Patient referred to Spine specialist for further evaluation and recommendations.  - Orthopedic & Spine  Referral; Future  - gabapentin (NEURONTIN) 300 MG capsule; Take 3 capsules (900 mg) by mouth 3 times daily  Dispense: 810 capsule; Refill: 3    2. Lumbar radiculopathy  As above.  - Orthopedic & Spine  Referral; Future  - gabapentin (NEURONTIN) 300 MG capsule; Take 3 capsules (900 mg) by mouth 3 times daily  Dispense: 810 capsule; Refill: 3    3. Overactive bladder  Trial oxybutynin.  - oxybutynin (DITROPAN) 5 MG tablet; Take 1 tablet (5 mg) by mouth 2 times daily  Dispense: 180 tablet; Refill: 3    Return in about 6 months (around 2/4/2021) for Physical Exam.      Phone call duration:  15 minutes    Steven Salinas MD, MD

## 2020-08-05 DIAGNOSIS — Z53.9 DIAGNOSIS NOT YET DEFINED: Primary | ICD-10-CM

## 2020-08-05 PROCEDURE — G0180 MD CERTIFICATION HHA PATIENT: HCPCS | Performed by: FAMILY MEDICINE

## 2020-08-05 ASSESSMENT — ANXIETY QUESTIONNAIRES: GAD7 TOTAL SCORE: 3

## 2020-08-05 NOTE — TELEPHONE ENCOUNTER
Please fax the following orders to [facility] Attention: Tuyet [phone]     Home Health Aide for 1 week  OT for 1 time for 1 week  Plan of Care - ASAP     These need to be signed by Dr. Salinas.

## 2020-08-05 NOTE — TELEPHONE ENCOUNTER
These orders is [employer]ximity fax to Dr. Salinas to address. Huan Cespedes,  For 1st Floor Primary Care

## 2020-08-07 ENCOUNTER — TELEPHONE (OUTPATIENT)
Dept: FAMILY MEDICINE | Facility: CLINIC | Age: 85
End: 2020-08-07

## 2020-08-07 NOTE — TELEPHONE ENCOUNTER
Reason for Call:  Orders    Detailed comments: Mitzy called to request verbal orders for the ok to discharge patient from home care and physical therapy and agency with most goals met.    Phone Number Patient can be reached at: Cell number on file:    Telephone Information:    Mitzy @ [phone]    or Other phone number:     Best Time: Any    Can we leave a detailed message on this number? YES    Call taken on 8/7/2020 at 3:26 PM by Mckenna Walsh

## 2020-08-11 ENCOUNTER — OFFICE VISIT (OUTPATIENT)
Dept: NEUROSURGERY | Facility: CLINIC | Age: 85
End: 2020-08-11
Attending: FAMILY MEDICINE
Payer: MEDICARE

## 2020-08-11 VITALS
BODY MASS INDEX: 31.28 KG/M2 | HEIGHT: 65 IN | DIASTOLIC BLOOD PRESSURE: 68 MMHG | HEART RATE: 75 BPM | OXYGEN SATURATION: 96 % | SYSTOLIC BLOOD PRESSURE: 109 MMHG

## 2020-08-11 DIAGNOSIS — M54.12 CERVICAL RADICULOPATHY: Primary | ICD-10-CM

## 2020-08-11 DIAGNOSIS — M54.16 LUMBAR RADICULOPATHY: ICD-10-CM

## 2020-08-11 DIAGNOSIS — M48.02 CERVICAL STENOSIS OF SPINAL CANAL: ICD-10-CM

## 2020-08-11 PROCEDURE — 99204 OFFICE O/P NEW MOD 45 MIN: CPT | Performed by: NURSE PRACTITIONER

## 2020-08-11 ASSESSMENT — PAIN SCALES - GENERAL: PAINLEVEL: MODERATE PAIN (5)

## 2020-08-11 NOTE — LETTER
8/11/2020         RE: Ivonne Gerard  [address]  [facility] [address]        Dear Colleague,    Thank you for referring your patient, Ivonne Gerard, to the [facility]. Please see a copy of my visit note below.    [facility] Neurosurgery Clinic Visit      CC: neck and back pain    Primary care Provider: Steven Salinas    Reason For Visit:   I was asked by Dr. Steven Salinas to consult on the patient for cervical stenosis and lumbar radiculopathy.      HPI: Ivonne Gerard is an 84 year old female with history of TIA, cervical and lumbar surgeries, and recent fall in May who presents with her son for evaluation of neck and back pain. She was admitted to [facility] on 5/29 following a fall and was found to have a L5 fracture; Neurosurgery was consulted and recommended non-operative management with no bracing as she was neurologically intact. Today, patient reports her pain has been present since 2018 and then progressively increased after her recent fall in May. She reports neck pain that radiates to her left arm and hand; denies right arm pain. She reports numbness, tingling, and weakness in bilateral arms and hands. She has mid to low back pain that radiates to left posterior leg to knee, along with numbness and tingling in bilateral legs and feet. She reports her right sided weakness is from her TIA, but she feels like it has worsened since May. Describes the pain as constant and burning. Pain is worsened with sitting for long periods of time and changing positions. Pain is alleviated with ice, Oxycodone, and Robaxin. She is currently taking Gabapentin but this doesn't provide much relief. She also tried PT in June 2020 while she was at a TCU. She was a using a walker prior to her fall, but she is now in a wheelchair. Denies bladder/bowel incontinence.      Current pain: 5/10   At worst: 10/10    Past Medical History:   Diagnosis Date     Allergic rhinitis       Anxiety associated with depression      Arthritis      Bronchitis, mucopurulent recurrent (H)     Tested negative for asthma     Cataract      Diverticulosis      GERD (gastroesophageal reflux disease) 2/23/2011     Hypertension goal BP (blood pressure) < 140/90 5/30/2013     Iron deficiency anemia     Resolved     Major depression in complete remission (H) 9/7/2012     Major depression in partial remission (H) 3/13/2012     Osteopenia      Spinal stenosis        Past Medical History reviewed with patient during visit.    Past Surgical History:   Procedure Laterality Date     COLONOSCOPY  9/25/2012    Procedure: COLONOSCOPY;  COLONOSCOPY SCREEN/ [facility] JDS;  Surgeon: Xiang Nielsen MD;  Location: [facility]     COLONOSCOPY WITH CO2 INSUFFLATION N/A 10/11/2016    Procedure: COLONOSCOPY WITH CO2 INSUFFLATION;  Surgeon: Lynne Hansen MD;  Location: [facility] OR     FOOT SURGERY Left End of July 2016    Hardware removal     SURGICAL HISTORY OF -   1994    right total hip     SURGICAL HISTORY OF -   9/98    repair perforated left ear drum     SURGICAL HISTORY OF -   5/99    discectomy lumbar     Past Surgical History reviewed with patient during visit.    Current Outpatient Medications   Medication     aspirin 81 MG tablet     atorvastatin (LIPITOR) 10 MG tablet     calcium carbonate-vitamin D (OYSTER SHELL CALCIUM/D) 500-200 MG-UNIT tablet     diclofenac (VOLTAREN) 75 MG EC tablet     DULoxetine (CYMBALTA) 30 MG capsule     gabapentin (NEURONTIN) 300 MG capsule     IMODIUM A-D 2 MG PO TABS     methocarbamol (ROBAXIN) 500 MG tablet     omeprazole (PRILOSEC) 20 MG DR capsule     oxybutynin (DITROPAN) 5 MG tablet     oxyCODONE (ROXICODONE) 5 MG tablet     propranolol ER (INDERAL LA) 60 MG 24 hr capsule     triamcinolone (KENALOG) 0.1 % ointment     No current facility-administered medications for this visit.        Allergies   Allergen Reactions     Sulfa Drugs Hives       Social History     Socioeconomic History      Marital status:      Spouse name: Not on file     Number of children: Not on file     Years of education: Not on file     Highest education level: Not on file   Occupational History     Not on file   Social Needs     Financial resource strain: Not on file     Food insecurity     Worry: Not on file     Inability: Not on file     Transportation needs     Medical: Not on file     Non-medical: Not on file   Tobacco Use     Smoking status: Former Smoker     Packs/day: 1.00     Years: 30.00     Pack years: 30.00     Types: Cigarettes     Last attempt to quit: 2/10/1986     Years since quittin.5     Smokeless tobacco: Never Used     Tobacco comment: quit 20 yrs   Substance and Sexual Activity     Alcohol use: Yes     Comment: once a month      Drug use: No     Sexual activity: Never     Partners: Male   Lifestyle     Physical activity     Days per week: Not on file     Minutes per session: Not on file     Stress: Not on file   Relationships     Social connections     Talks on phone: Not on file     Gets together: Not on file     Attends Congregational service: Not on file     Active member of club or organization: Not on file     Attends meetings of clubs or organizations: Not on file     Relationship status: Not on file     Intimate partner violence     Fear of current or ex partner: Not on file     Emotionally abused: Not on file     Physically abused: Not on file     Forced sexual activity: Not on file   Other Topics Concern     Parent/sibling w/ CABG, MI or angioplasty before 65F 55M? No      Service No     Blood Transfusions No     Caffeine Concern No     Occupational Exposure No     Hobby Hazards No     Sleep Concern No     Stress Concern No     Weight Concern Yes     Special Diet No     Back Care No     Exercise No     Bike Helmet No     Seat Belt Yes     Self-Exams Yes   Social History Narrative     Not on file       Family History   Problem Relation Age of Onset     Allergies Mother          "asthma     Cancer - colorectal Mother      Breast Cancer Mother      Alzheimer Disease Mother         dementia     Coronary Artery Disease Brother         stents     Cancer - colorectal Other         cousins on mothers side     Hyperlipidemia No family hx of      Hypertension No family hx of           ROS: 10 point ROS neg other than the symptoms noted above in the HPI.    Vital Signs: Ht 5' 5\" (1.651 m)   LMP  (LMP Unknown)   BMI 31.28 kg/m      Examination:  Constitutional:  Alert, well nourished, NAD.  HEENT: Normocephalic, atraumatic.   Pulmonary:  Without shortness of breath, normal effort.   Integumentary: Skin is free of rashes or lesions.   Cardiovascular:  No pitting edema of BLE.      Neurological:  Awake  Alert  Oriented x 3  Speech clear  Cranial nerves II - XII grossly intact  PERRL  Face symmetric  Motor exam   Shoulder Abduction:  Right:  4/5   Left:  5/5  Biceps:                      Right:  5/5   Left:  5/5  Triceps:                     Right:  5/5   Left:  5/5  Wrist Extensors:       Right:  5/5   Left:  5/5  Wrist Flexors:           Right:  5/5   Left:  5/5  Intrinsics:                   Right:  5/5   Left:  5/5  Hip Flexor:                Right: 4/5  Left:  5/5  Quadriceps:              Right:  5/5  Left:  5/5  Hamstrings:              Right:  5/5  Left:  5/5  Gastroc Soleus:        Right:  5/5  Left:  5/5  Tib/Ant:                      Right:  5/5  Left:  5/5  EHL:                          Right:  5/5  Left:  5/5         Sensation normal to bilateral upper and lower extremities.    Reflexes are 2+ in the patellar and Achilles. There is no clonus. Downgoing Babinski.    Reflexes are 2+ in the brachial radialis and triceps. Negative Leslie sign bilaterally.    Musculoskeletal:  Gait: Sitting in wheelchair. Unable to assess gait today.     Cervical examination reveals good range of motion.  No tenderness to palpation of the cervical spine or paraspinous muscles bilaterally.    Lumbar " examination reveals no tenderness of the spine or paraspinous muscles.  Straight leg raise is negative bilaterally.      Imaging:   CT of the thoracic spine from 5/29/20 reviewed in office.   IMPRESSION:   1.  Recent-appearing (likely acute or subacute) compression fracture along superior endplates at T4 and T5.  2.  Chronic compression fractures at lower thoracic and upper lumbar levels.  3.  A few tiny groundglass nodules at the right lung apex are nonspecific. Some of these were present on neck CT from August 2018. Both chronic inflammatory lesions and adenocarcinoma in situ are possible.    CT of the lumbar spine from 5/29/20 reviewed in office.  IMPRESSION:   1.  Diffuse osteopenia.  2.  Acute fracture of the L5 vertebra as outlined, without appreciable vertebral height loss or retropulsion.  3.  Chronic appearing severe T12, moderate L1 and moderate L2 vertebral compression fracture deformities, without retropulsion. Grade 1 retrolisthesis at L1-2.    Assessment/Plan:   Ivonne Gerard is an 84 year old female with history of TIA, cervical and lumbar surgeries, and recent fall in May who presents with her son for evaluation of neck and back pain. Today, patient reports this pain has been present since 2018 and then progressively increased after her recent fall in May. She reports neck pain that radiates to her left arm and hand; denies right arm pain. She reports numbness, tingling, and weakness in bilateral arms and hands. She has mid to low back pain that radiates to left posterior leg to knee, along with numbness and tingling in bilateral legs and feet. She reports her right sided weakness is from her TIA, but she feels like it has worsened since May.     Reviewed with Adamaris YAP. We would recommend obtaining cervical, thoracic, and lumbar spine MRIs for further evaluation. Patient and son voiced agreement with this plan. I will call with results and next steps. Advised them to call back sooner if symptoms  change or worsen.       Angela Alberto, SONU  [facility] Neurosurgery  [address]  [address] [place] [address], [place] [address]    Tel [phone]      Again, thank you for allowing me to participate in the care of your patient.        Sincerely,        Angela Alberto, NP

## 2020-08-11 NOTE — PROGRESS NOTES
[facility] Neurosurgery Clinic Visit      CC: neck and back pain    Primary care Provider: Steven Salinas    Reason For Visit:   I was asked by Dr. Steven Salinas to consult on the patient for cervical stenosis and lumbar radiculopathy.      HPI: Ivonne Gerard is an 84 year old female with history of TIA, cervical and lumbar surgeries, and recent fall in May who presents with her son for evaluation of neck and back pain. She was admitted to [facility] on 5/29 following a fall and was found to have a L5 fracture; Neurosurgery was consulted and recommended non-operative management with no bracing as she was neurologically intact. Today, patient reports her pain has been present since 2018 and then progressively increased after her recent fall in May. She reports neck pain that radiates to her left arm and hand; denies right arm pain. She reports numbness, tingling, and weakness in bilateral arms and hands. She has mid to low back pain that radiates to left posterior leg to knee, along with numbness and tingling in bilateral legs and feet. She reports her right sided weakness is from her TIA, but she feels like it has worsened since May. Describes the pain as constant and burning. Pain is worsened with sitting for long periods of time and changing positions. Pain is alleviated with ice, Oxycodone, and Robaxin. She is currently taking Gabapentin but this doesn't provide much relief. She also tried PT in June 2020 while she was at a TCU. She was a using a walker prior to her fall, but she is now in a wheelchair. Denies bladder/bowel incontinence.      Current pain: 5/10   At worst: 10/10    Past Medical History:   Diagnosis Date     Allergic rhinitis      Anxiety associated with depression      Arthritis      Bronchitis, mucopurulent recurrent (H)     Tested negative for asthma     Cataract      Diverticulosis      GERD (gastroesophageal reflux disease) 2/23/2011     Hypertension goal BP (blood pressure) <  140/90 5/30/2013     Iron deficiency anemia     Resolved     Major depression in complete remission (H) 9/7/2012     Major depression in partial remission (H) 3/13/2012     Osteopenia      Spinal stenosis        Past Medical History reviewed with patient during visit.    Past Surgical History:   Procedure Laterality Date     COLONOSCOPY  9/25/2012    Procedure: COLONOSCOPY;  COLONOSCOPY SCREEN/ [facility] JDS;  Surgeon: Xiang Nielsen MD;  Location: [facility] OR     COLONOSCOPY WITH CO2 INSUFFLATION N/A 10/11/2016    Procedure: COLONOSCOPY WITH CO2 INSUFFLATION;  Surgeon: Lynne Hansen MD;  Location: [facility] OR     FOOT SURGERY Left End of July 2016    Hardware removal     SURGICAL HISTORY OF -   1994    right total hip     SURGICAL HISTORY OF -   9/98    repair perforated left ear drum     SURGICAL HISTORY OF -   5/99    discectomy lumbar     Past Surgical History reviewed with patient during visit.    Current Outpatient Medications   Medication     aspirin 81 MG tablet     atorvastatin (LIPITOR) 10 MG tablet     calcium carbonate-vitamin D (OYSTER SHELL CALCIUM/D) 500-200 MG-UNIT tablet     diclofenac (VOLTAREN) 75 MG EC tablet     DULoxetine (CYMBALTA) 30 MG capsule     gabapentin (NEURONTIN) 300 MG capsule     IMODIUM A-D 2 MG PO TABS     methocarbamol (ROBAXIN) 500 MG tablet     omeprazole (PRILOSEC) 20 MG DR capsule     oxybutynin (DITROPAN) 5 MG tablet     oxyCODONE (ROXICODONE) 5 MG tablet     propranolol ER (INDERAL LA) 60 MG 24 hr capsule     triamcinolone (KENALOG) 0.1 % ointment     No current facility-administered medications for this visit.        Allergies   Allergen Reactions     Sulfa Drugs Hives       Social History     Socioeconomic History     Marital status:      Spouse name: Not on file     Number of children: Not on file     Years of education: Not on file     Highest education level: Not on file   Occupational History     Not on file   Social Needs     Financial resource strain:  Not on file     Food insecurity     Worry: Not on file     Inability: Not on file     Transportation needs     Medical: Not on file     Non-medical: Not on file   Tobacco Use     Smoking status: Former Smoker     Packs/day: 1.00     Years: 30.00     Pack years: 30.00     Types: Cigarettes     Last attempt to quit: 2/10/1986     Years since quittin.5     Smokeless tobacco: Never Used     Tobacco comment: quit 20 yrs   Substance and Sexual Activity     Alcohol use: Yes     Comment: once a month      Drug use: No     Sexual activity: Never     Partners: Male   Lifestyle     Physical activity     Days per week: Not on file     Minutes per session: Not on file     Stress: Not on file   Relationships     Social connections     Talks on phone: Not on file     Gets together: Not on file     Attends Orthodoxy service: Not on file     Active member of club or organization: Not on file     Attends meetings of clubs or organizations: Not on file     Relationship status: Not on file     Intimate partner violence     Fear of current or ex partner: Not on file     Emotionally abused: Not on file     Physically abused: Not on file     Forced sexual activity: Not on file   Other Topics Concern     Parent/sibling w/ CABG, MI or angioplasty before 65F 55M? No      Service No     Blood Transfusions No     Caffeine Concern No     Occupational Exposure No     Hobby Hazards No     Sleep Concern No     Stress Concern No     Weight Concern Yes     Special Diet No     Back Care No     Exercise No     Bike Helmet No     Seat Belt Yes     Self-Exams Yes   Social History Narrative     Not on file       Family History   Problem Relation Age of Onset     Allergies Mother         asthma     Cancer - colorectal Mother      Breast Cancer Mother      Alzheimer Disease Mother         dementia     Coronary Artery Disease Brother         stents     Cancer - colorectal Other         cousins on mothers side     Hyperlipidemia No family hx of   "    Hypertension No family hx of           ROS: 10 point ROS neg other than the symptoms noted above in the HPI.    Vital Signs: Ht 5' 5\" (1.651 m)   LMP  (LMP Unknown)   BMI 31.28 kg/m      Examination:  Constitutional:  Alert, well nourished, NAD.  HEENT: Normocephalic, atraumatic.   Pulmonary:  Without shortness of breath, normal effort.   Integumentary: Skin is free of rashes or lesions.   Cardiovascular:  No pitting edema of BLE.      Neurological:  Awake  Alert  Oriented x 3  Speech clear  Cranial nerves II - XII grossly intact  PERRL  Face symmetric  Motor exam   Shoulder Abduction:  Right:  4/5   Left:  5/5  Biceps:                      Right:  5/5   Left:  5/5  Triceps:                     Right:  5/5   Left:  5/5  Wrist Extensors:       Right:  5/5   Left:  5/5  Wrist Flexors:           Right:  5/5   Left:  5/5  Intrinsics:                   Right:  5/5   Left:  5/5  Hip Flexor:                Right: 4/5  Left:  5/5  Quadriceps:              Right:  5/5  Left:  5/5  Hamstrings:              Right:  5/5  Left:  5/5  Gastroc Soleus:        Right:  5/5  Left:  5/5  Tib/Ant:                      Right:  5/5  Left:  5/5  EHL:                          Right:  5/5  Left:  5/5         Sensation normal to bilateral upper and lower extremities.    Reflexes are 2+ in the patellar and Achilles. There is no clonus. Downgoing Babinski.    Reflexes are 2+ in the brachial radialis and triceps. Negative Leslie sign bilaterally.    Musculoskeletal:  Gait: Sitting in wheelchair. Unable to assess gait today.     Cervical examination reveals good range of motion.  No tenderness to palpation of the cervical spine or paraspinous muscles bilaterally.    Lumbar examination reveals no tenderness of the spine or paraspinous muscles.  Straight leg raise is negative bilaterally.      Imaging:   CT of the thoracic spine from 5/29/20 reviewed in office.   IMPRESSION:   1.  Recent-appearing (likely acute or subacute) compression " fracture along superior endplates at T4 and T5.  2.  Chronic compression fractures at lower thoracic and upper lumbar levels.  3.  A few tiny groundglass nodules at the right lung apex are nonspecific. Some of these were present on neck CT from August 2018. Both chronic inflammatory lesions and adenocarcinoma in situ are possible.    CT of the lumbar spine from 5/29/20 reviewed in office.  IMPRESSION:   1.  Diffuse osteopenia.  2.  Acute fracture of the L5 vertebra as outlined, without appreciable vertebral height loss or retropulsion.  3.  Chronic appearing severe T12, moderate L1 and moderate L2 vertebral compression fracture deformities, without retropulsion. Grade 1 retrolisthesis at L1-2.    Assessment/Plan:   Ivonne Gerard is an 84 year old female with history of TIA, cervical and lumbar surgeries, and recent fall in May who presents with her son for evaluation of neck and back pain. Today, patient reports this pain has been present since 2018 and then progressively increased after her recent fall in May. She reports neck pain that radiates to her left arm and hand; denies right arm pain. She reports numbness, tingling, and weakness in bilateral arms and hands. She has mid to low back pain that radiates to left posterior leg to knee, along with numbness and tingling in bilateral legs and feet. She reports her right sided weakness is from her TIA, but she feels like it has worsened since May.     Reviewed with Adamaris Price PA-C. We would recommend obtaining cervical, thoracic, and lumbar spine MRIs for further evaluation. Patient and son voiced agreement with this plan. I will call with results and next steps. Advised them to call back sooner if symptoms change or worsen.       Angela Alberto CNP  [facility] Neurosurgery  [address] [address]    Tel [phone]

## 2020-08-11 NOTE — PATIENT INSTRUCTIONS
Orders for cervical, thoracic, and lumbar spine MRI. We will call with the results and next steps.     Please call with any questions or concerns.    Angela Alberto CNP  [facility] Neurosurgery  [address] [address]    Tel [phone]

## 2020-08-13 DIAGNOSIS — S32.050S CLOSED COMPRESSION FRACTURE OF L5 LUMBAR VERTEBRA, SEQUELA: ICD-10-CM

## 2020-08-13 NOTE — TELEPHONE ENCOUNTER
Reason for Call:  Medication or medication refill:    Do you use a [facility]?  Name of the pharmacy and phone number for the current request:  [facility]/pharmacy #[id number] - [place], [place] - [address]     Name of the medication requested: oxyCODONE (ROXICODONE) 5 MG tablet    Can we leave a detailed message on this number? YES    Phone number patient can be reached at: Home number on file [phone] (home)    Best Time: anytime    Call taken on 8/13/2020 at 10:16 AM by Everett Kirk

## 2020-08-18 RX ORDER — OXYCODONE HYDROCHLORIDE 5 MG/1
5 TABLET ORAL EVERY 6 HOURS PRN
Qty: 60 TABLET | Refills: 0 | Status: SHIPPED | OUTPATIENT
Start: 2020-08-18 | End: 2020-09-17

## 2020-08-18 NOTE — TELEPHONE ENCOUNTER
Routing refill request to provider for review/approval because:  Drug not on the [facility] refill protocol     Controlled Substance Refill Request for Oxycodone  Problem List Complete:  No     PROVIDER TO CONSIDER COMPLETION OF PROBLEM LIST AND OVERVIEW/CONTROLLED SUBSTANCE AGREEMENT    Last Written Prescription Date:  7/20/20  Last Fill Quantity: 60,   # refills: 0      Last Office Visit with [facility] primary care provider: Virtual visit on 8/4/20. Last face-to-face visit was 1/22/20.    Future Office visit:     Controlled substance agreement:   Encounter-Level CSA:    There are no encounter-level csa.     Patient-Level CSA:    There are no patient-level csa.         Last Urine Drug Screen: No results found for: CDAUT, No results found for: COMDAT, No results found for: THC13, PCP13, COC13, MAMP13, OPI13, AMP13, BZO13, TCA13, MTD13, BAR13, OXY13, PPX13, BUP13     Processing:  Rx to be electronically transmitted to pharmacy by provider      [email or web address]       checked in past 3 months?  No. Problem list is incomplete, no chronic pain diagnosis. No CSA on file.   not checked.    Latasha Olivas RN  [facility]

## 2020-08-28 DIAGNOSIS — M48.02 CERVICAL STENOSIS OF SPINAL CANAL: ICD-10-CM

## 2020-08-31 ENCOUNTER — TELEPHONE (OUTPATIENT)
Dept: FAMILY MEDICINE | Facility: CLINIC | Age: 85
End: 2020-08-31

## 2020-08-31 DIAGNOSIS — M48.02 CERVICAL STENOSIS OF SPINAL CANAL: ICD-10-CM

## 2020-08-31 RX ORDER — METHOCARBAMOL 500 MG/1
TABLET, FILM COATED ORAL
Qty: 120 TABLET | Refills: 3 | Status: SHIPPED | OUTPATIENT
Start: 2020-08-31 | End: 2020-09-01

## 2020-08-31 NOTE — TELEPHONE ENCOUNTER
Requested Prescriptions   Pending Prescriptions Disp Refills     methocarbamol (ROBAXIN) 500 MG tablet [Pharmacy Med Name: METHOCARBAMOL 500 MG TABLET] 120 tablet 3     Sig: TAKE 1 AND 1/2 TABLETS BY MOUTH 4 TIMES DAILY AS NEEDED FOR MUSCLE SPASM       There is no refill protocol information for this order        Routing refill request to provider for review/approval because:  Drug not on the [facility] refill protocol     Oxana Edwards RN, BSN, PHN

## 2020-08-31 NOTE — TELEPHONE ENCOUNTER
methocarbamol (ROBAXIN) 500 MG tablet  Non formulary and not covered by insurance.    Pharmacy looking for alternative

## 2020-09-17 DIAGNOSIS — S32.050S CLOSED COMPRESSION FRACTURE OF L5 LUMBAR VERTEBRA, SEQUELA: ICD-10-CM

## 2020-09-17 RX ORDER — OXYCODONE HYDROCHLORIDE 5 MG/1
5 TABLET ORAL EVERY 6 HOURS PRN
Qty: 60 TABLET | Refills: 0 | Status: SHIPPED | OUTPATIENT
Start: 2020-09-17 | End: 2020-10-28

## 2020-09-17 NOTE — TELEPHONE ENCOUNTER
Reason for Call:  Medication or medication refill:    Do you use a [facility]?  Name of the pharmacy and phone number for the current request:  [facility]/PHARMACY #[id number] - [place], [place] - [address]     Name of the medication requested: oxyCODONE (ROXICODONE) 5 MG tablet     Other request:     Can we leave a detailed message on this number? YES    Phone number patient can be reached at: Cell number on file:    Telephone Information:   Mobile [phone]       Best Time: ANy    Call taken on 9/17/2020 at 2:53 PM by Mckenna Walsh

## 2020-09-17 NOTE — TELEPHONE ENCOUNTER
Controlled Substance Refill Request for Oxycodone  Problem List Complete:  No      PROVIDER TO CONSIDER COMPLETION OF PROBLEM LIST AND OVERVIEW/CONTROLLED SUBSTANCE AGREEMENT     Last Written Prescription Date:  8/18/20  Last Fill Quantity: 60 tablets   # refills: 0        Last Office Visit with [facility] primary care provider: Virtual visit on 8/4/20. Last face-to-face visit was 1/22/20.     Future Office visit: None     Controlled substance agreement:   Encounter-Level CSA:    There are no encounter-level csa.      Patient-Level CSA:    There are no patient-level csa.            Last Urine Drug Screen: No results found for: CDAUT, No results found for: COMDAT, No results found for: THC13, PCP13, COC13, MAMP13, OPI13, AMP13, BZO13, TCA13, MTD13, BAR13, OXY13, PPX13, BUP13     Processing:  Rx to be electronically transmitted to pharmacy by provider       [email or web address]         checked in past 3 months?  No. Problem list is incomplete, no chronic pain diagnosis. No CSA on file.   not checked.        Oxana Edwards RN, BSN, PHN

## 2020-09-21 ENCOUNTER — ANCILLARY PROCEDURE (OUTPATIENT)
Dept: MRI IMAGING | Facility: CLINIC | Age: 85
End: 2020-09-21
Attending: NURSE PRACTITIONER
Payer: MEDICARE

## 2020-09-21 ENCOUNTER — TELEPHONE (OUTPATIENT)
Dept: NEUROSURGERY | Facility: CLINIC | Age: 85
End: 2020-09-21

## 2020-09-21 DIAGNOSIS — M54.16 LUMBAR RADICULOPATHY: ICD-10-CM

## 2020-09-21 DIAGNOSIS — S32.058D OTHER CLOSED FRACTURE OF FIFTH LUMBAR VERTEBRA WITH ROUTINE HEALING, SUBSEQUENT ENCOUNTER: Primary | ICD-10-CM

## 2020-09-21 DIAGNOSIS — M54.12 CERVICAL RADICULOPATHY: ICD-10-CM

## 2020-09-21 PROCEDURE — 72148 MRI LUMBAR SPINE W/O DYE: CPT | Mod: TC

## 2020-09-21 PROCEDURE — 72141 MRI NECK SPINE W/O DYE: CPT | Mod: TC

## 2020-09-21 PROCEDURE — 72146 MRI CHEST SPINE W/O DYE: CPT | Mod: TC

## 2020-09-21 NOTE — TELEPHONE ENCOUNTER
Reviewed MRI results with Dr. Crowe. Patient has an unstable L5 fracture involving the anterior and middle columns, may also extend into the left pedicle and left posterior column. Not significantly changed compared to lumbar spine CT on 5/29/20. Recommend LSO brace, updated lumbar CT, then follow-up in clinic this Wednesday 9/23/20.     Called and spoke to patient's son regarding MRI results and the above recommendations. He voiced agreement with this plan. Advised him to call back with any questions or concerns.     Sent message to scheduling team to coordinate appts.     Angela Alberto CNP  [facility] Neurosurgery  [address] [address]  Tel [phone]  Pager [phone]

## 2020-09-22 NOTE — TELEPHONE ENCOUNTER
Reviewed appts with patient's son Jean-Pierre. Updated him he will need to sign updated consent to communicate when he comes in.  Called and was able to get patient in with Orthotics tomorrow 9/23 at 12 noon for brace fitting in [place].

## 2020-09-22 NOTE — TELEPHONE ENCOUNTER
Scheduled patient with Angela Alberto CNP at 1 pm 9/23.     Called and LM at 1:13 pm 9/22/20 for Orthotics to expedite reaching out and getting patient in for LSO brace fitting.     Called central scheduling to get patient scheduled for CT lumbar prior to 9/23 visit was on hold then LM for them to get her scheduled prior to appt and to call back once appt is scheduled to confirm.

## 2020-09-22 NOTE — TELEPHONE ENCOUNTER
Patient's son Javon would like a call back, he is wanting some clarification everything his mother needs done, he can be reached at [phone].

## 2020-09-23 ENCOUNTER — OFFICE VISIT (OUTPATIENT)
Dept: NEUROSURGERY | Facility: CLINIC | Age: 85
End: 2020-09-23
Attending: NURSE PRACTITIONER
Payer: MEDICARE

## 2020-09-23 ENCOUNTER — HOSPITAL ENCOUNTER (OUTPATIENT)
Dept: CT IMAGING | Facility: CLINIC | Age: 85
End: 2020-09-23
Attending: NURSE PRACTITIONER
Payer: MEDICARE

## 2020-09-23 VITALS
HEART RATE: 74 BPM | DIASTOLIC BLOOD PRESSURE: 84 MMHG | HEIGHT: 65 IN | OXYGEN SATURATION: 96 % | SYSTOLIC BLOOD PRESSURE: 144 MMHG | BODY MASS INDEX: 31.28 KG/M2 | RESPIRATION RATE: 18 BRPM

## 2020-09-23 DIAGNOSIS — S32.058D OTHER CLOSED FRACTURE OF FIFTH LUMBAR VERTEBRA WITH ROUTINE HEALING, SUBSEQUENT ENCOUNTER: Primary | ICD-10-CM

## 2020-09-23 DIAGNOSIS — S32.058D OTHER CLOSED FRACTURE OF FIFTH LUMBAR VERTEBRA WITH ROUTINE HEALING, SUBSEQUENT ENCOUNTER: ICD-10-CM

## 2020-09-23 DIAGNOSIS — M54.16 LUMBAR RADICULOPATHY: ICD-10-CM

## 2020-09-23 PROCEDURE — 99213 OFFICE O/P EST LOW 20 MIN: CPT | Performed by: NURSE PRACTITIONER

## 2020-09-23 PROCEDURE — 72131 CT LUMBAR SPINE W/O DYE: CPT

## 2020-09-23 PROCEDURE — G0463 HOSPITAL OUTPT CLINIC VISIT: HCPCS

## 2020-09-23 ASSESSMENT — PAIN SCALES - GENERAL: PAINLEVEL: MILD PAIN (3)

## 2020-09-23 NOTE — LETTER
"    9/23/2020         RE: Ivonne Gerard  [address]  [place] [address]        Dear Colleague,    Thank you for referring your patient, Ivonne Gerard, to the [facility] NEUROS[facility]GERY [facility]INIC. Please see a copy of my visit note below.    [facility] Neurosurgery  Neurosurgery Follow Up:    HPI: 84 year old female who presented to the clinic on 8/11/2020 for neck and back pain with radiculopathy. A cervical/thoracic/and lumbar MRI was recommended at that time. The lumbar MRI revealed a fracture through the L5 vertebral body including the anterior and middle columns and may extend into the left pedicle. This was classified as an unstable fracture of uncertain age. She was contacted with the results and recommended to undergo a lumbar CT and obtain a LSO brace and follow up in clinic to discuss the results and further recommendations.  Today she presents for follow up. She reports low back pain. She denies radicular symptoms. She has bilateral foot paresthesias s/p TIA years ago. She reports weakness in her legs. She was able to obtain a LSO brace.     Medical, surgical, family, and social history unchanged since prior exam.  Exam:  Constitutional:  Alert, well nourished, NAD.  HEENT: Normocephalic, atraumatic.   Pulm:  Without shortness of breath   CV:  No pitting edema of BLE.      Vital Signs:  BP (!) 144/84   Pulse 74   Resp 18   Ht 5' 5\" (1.651 m)   LMP  (LMP Unknown)   SpO2 96%   BMI 31.28 kg/m      Neurological:  Awake  Alert  Oriented x 3  Motor exam:        IP Q DF PF EHL  R   5  5   5   5    5  L    5  5   5   5    5     Able to spontaneously move L/E bilaterally  Sensation intact throughout all L/E dermatomes    Imaging: Lumbar CT 9/23/2020  IMPRESSION: A transverse nondisplaced fracture through the body of the L5 vertebra extending into the left pedicle again noted and was present on the comparison lumbar spine CT scan from 5/29/2020. There has been interval " sclerosis across the site of the fracture. No new fractures noted.     A/P: L5 fracture. Orthotics already placed in brace. Dr. Crowe was able to discuss options including conservative management with a brace as well as surgical options. Risks and benefits of both options were discussed. Patient and family agreed to pursue conservative measures including an LSO brace at this time. She verbalized understanding the risk of brace failure. We will see her back in clinic in 6 weeks with a lumbar XR prior and in 3 months with a lumbar CT prior. Discussed red flag symptoms. She will contact the clinic with new or worsening symptoms. She verbalized understanding and agreement.    Patient Instructions   -Continue to wear brace at all times when out of bed.   -Follow up in 6 weeks with lumbar xray prior.  -Please contact the clinic with questions or concerns at [phone].    Kala Christensen CNP  [facility] Neurosurgery  [address] [place] [address]  Tel [phone]  Fax [phone]      Again, thank you for allowing me to participate in the care of your patient.        Sincerely,        Kala Christensen, EVONNE

## 2020-09-23 NOTE — PATIENT INSTRUCTIONS
-Continue to wear brace at all times when out of bed.   -Follow up in 6 weeks with lumbar xray prior.  -Please contact the clinic with questions or concerns at [phone].

## 2020-09-23 NOTE — PROGRESS NOTES
"[facility] Neurosurgery  Neurosurgery Follow Up:    HPI: 84 year old female who presented to the clinic on 8/11/2020 for neck and back pain with radiculopathy. A cervical/thoracic/and lumbar MRI was recommended at that time. The lumbar MRI revealed a fracture through the L5 vertebral body including the anterior and middle columns and may extend into the left pedicle. This was classified as an unstable fracture of uncertain age. She was contacted with the results and recommended to undergo a lumbar CT and obtain a LSO brace and follow up in clinic to discuss the results and further recommendations.  Today she presents for follow up. She reports low back pain. She denies radicular symptoms. She has bilateral foot paresthesias s/p TIA years ago. She reports weakness in her legs. She was able to obtain a LSO brace.     Medical, surgical, family, and social history unchanged since prior exam.  Exam:  Constitutional:  Alert, well nourished, NAD.  HEENT: Normocephalic, atraumatic.   Pulm:  Without shortness of breath   CV:  No pitting edema of BLE.      Vital Signs:  BP (!) 144/84   Pulse 74   Resp 18   Ht 5' 5\" (1.651 m)   LMP  (LMP Unknown)   SpO2 96%   BMI 31.28 kg/m      Neurological:  Awake  Alert  Oriented x 3  Motor exam:        IP Q DF PF EHL  R   5  5   5   5    5  L    5  5   5   5    5     Able to spontaneously move L/E bilaterally  Sensation intact throughout all L/E dermatomes    Imaging: Lumbar CT 9/23/2020  IMPRESSION: A transverse nondisplaced fracture through the body of the L5 vertebra extending into the left pedicle again noted and was present on the comparison lumbar spine CT scan from 5/29/2020. There has been interval sclerosis across the site of the fracture. No new fractures noted.     A/P: L5 fracture. Orthotics already placed in brace. Dr. Crowe was able to discuss options including conservative management with a brace as well as surgical options. Risks and benefits of both options " were discussed. Patient and family agreed to pursue conservative measures including an LSO brace at this time. She verbalized understanding the risk of brace failure. We will see her back in clinic in 6 weeks with a lumbar XR prior and in 3 months with a lumbar CT prior. Discussed red flag symptoms. She will contact the clinic with new or worsening symptoms. She verbalized understanding and agreement.    Patient Instructions   -Continue to wear brace at all times when out of bed.   -Follow up in 6 weeks with lumbar xray prior.  -Please contact the clinic with questions or concerns at [phone].    Kala Christensen, [facility] Neurosurgery  [address] [place] [address]  Tel [phone]  Fax [phone]

## 2020-09-23 NOTE — NURSING NOTE
"Ivonne Gerard is a 84 year old female who presents for:  Chief Complaint   Patient presents with     Follow Up        Initial Vitals:  BP (!) 144/84   Pulse 74   Resp 18   Ht 5' 5\" (1.651 m)   LMP  (LMP Unknown)   SpO2 96%   BMI 31.28 kg/m   Estimated body mass index is 31.28 kg/m  as calculated from the following:    Height as of this encounter: 5' 5\" (1.651 m).    Weight as of 7/2/20: 188 lb (85.3 kg).. Body surface area is 1.98 meters squared. BP completed using cuff size: large  Mild Pain (3)    Nursing Comments: Pt present today for back issues.    Neftali Gillespie, [facility]    "

## 2020-10-27 DIAGNOSIS — S32.050S CLOSED COMPRESSION FRACTURE OF L5 LUMBAR VERTEBRA, SEQUELA: ICD-10-CM

## 2020-10-27 DIAGNOSIS — E78.5 HYPERLIPIDEMIA LDL GOAL <130: ICD-10-CM

## 2020-10-27 NOTE — TELEPHONE ENCOUNTER
Reason for Call:  Medication or medication refill:    Do you use a [facility]?  Name of the pharmacy and phone number for the current request:  [facility]/pharmacy #[id number] - [place], [place] - [address]    Name of the medication requested: oxyCODONE (ROXICODONE) 5 MG tablet    Other request: anytime    Can we leave a detailed message on this number? YES    Phone number patient can be reached at: Home number on file [phone] (home)    Best Time: anytime    Call taken on 10/27/2020 at 11:10 AM by Everett Kirk

## 2020-10-28 DIAGNOSIS — M48.02 CERVICAL STENOSIS OF SPINAL CANAL: ICD-10-CM

## 2020-10-28 RX ORDER — ATORVASTATIN CALCIUM 10 MG/1
TABLET, FILM COATED ORAL
Qty: 90 TABLET | Refills: 0 | Status: SHIPPED | OUTPATIENT
Start: 2020-10-28 | End: 2021-01-21

## 2020-10-28 RX ORDER — OXYCODONE HYDROCHLORIDE 5 MG/1
5 TABLET ORAL EVERY 6 HOURS PRN
Qty: 60 TABLET | Refills: 0 | Status: SHIPPED | OUTPATIENT
Start: 2020-10-28 | End: 2020-12-16

## 2020-10-28 RX ORDER — GABAPENTIN 600 MG/1
600 TABLET ORAL 3 TIMES DAILY
Qty: 270 TABLET | Refills: 3 | Status: SHIPPED | OUTPATIENT
Start: 2020-10-28 | End: 2021-10-28

## 2020-10-28 NOTE — TELEPHONE ENCOUNTER
Requested Prescriptions   Pending Prescriptions Disp Refills     gabapentin (NEURONTIN) 600 MG tablet [Pharmacy Med Name: GABAPENTIN 600 MG TABLET] 270 tablet 3     Sig: TAKE 1 TABLET (600 MG) BY MOUTH 3 TIMES DAILY       There is no refill protocol information for this order        Routing refill request to provider for review/approval because:  Drug not on the [facility] refill protocol       Oxana Edwards RN, BSN, PHN

## 2020-10-28 NOTE — TELEPHONE ENCOUNTER
Requested Prescriptions   Pending Prescriptions Disp Refills     oxyCODONE (ROXICODONE) 5 MG tablet 60 tablet 0     Sig: Take 1 tablet (5 mg) by mouth every 6 hours as needed for pain       There is no refill protocol information for this order        Routing refill request to provider for review/approval because:  Drug not on the [facility] [place]

## 2020-10-28 NOTE — TELEPHONE ENCOUNTER
"Requested Prescriptions   Pending Prescriptions Disp Refills     atorvastatin (LIPITOR) 10 MG tablet [Pharmacy Med Name: ATORVASTATIN 10 MG TABLET] 90 tablet 1     Sig: TAKE 1 TABLET BY MOUTH EVERY DAY       Statins Protocol Failed - 10/27/2020 11:14 AM        Failed - LDL on file in past 12 months     Recent Labs   Lab Test 07/26/16  1351   LDL 77             Passed - No abnormal creatine kinase in past 12 months     No lab results found.             Passed - Recent (12 mo) or future (30 days) visit within the authorizing provider's specialty     Patient has had an office visit with the authorizing provider or a provider within the authorizing providers department within the previous 12 mos or has a future within next 30 days. See \"Patient Info\" tab in inbasket, or \"Choose Columns\" in Meds & Orders section of the refill encounter.              Passed - Medication is active on med list        Passed - Patient is age 18 or older        Passed - No active pregnancy on record        Passed - No positive pregnancy test in past 12 months             Routing refill request to provider for review/approval because:  Labs not current:  FLP        "

## 2020-11-11 ENCOUNTER — TELEPHONE (OUTPATIENT)
Dept: NEUROSURGERY | Facility: CLINIC | Age: 85
End: 2020-11-11

## 2020-11-11 NOTE — TELEPHONE ENCOUNTER
Pt's son Jean-Pierre calling in regards to appt pt has scheduled 11/12/20 with Benjy Bernardo. Appt was scheduled as an in person visit but caller states that they got a call stating that it was being changed to a telephone or video visit.  Pt's son is concerned, because he thought they were supposed to come in for the f/u appt and have XR's done at that time and review them.  Please contact pt's son to discuss

## 2020-11-12 ENCOUNTER — ANCILLARY PROCEDURE (OUTPATIENT)
Dept: GENERAL RADIOLOGY | Facility: CLINIC | Age: 85
End: 2020-11-12
Attending: NURSE PRACTITIONER
Payer: MEDICARE

## 2020-11-12 ENCOUNTER — VIRTUAL VISIT (OUTPATIENT)
Dept: NEUROSURGERY | Facility: CLINIC | Age: 85
End: 2020-11-12
Payer: MEDICARE

## 2020-11-12 DIAGNOSIS — Z98.1 HISTORY OF LUMBAR SPINAL FUSION: Primary | ICD-10-CM

## 2020-11-12 DIAGNOSIS — S32.058D OTHER CLOSED FRACTURE OF FIFTH LUMBAR VERTEBRA WITH ROUTINE HEALING, SUBSEQUENT ENCOUNTER: ICD-10-CM

## 2020-11-12 PROCEDURE — 99024 POSTOP FOLLOW-UP VISIT: CPT | Performed by: PHYSICIAN ASSISTANT

## 2020-11-12 PROCEDURE — 72100 X-RAY EXAM L-S SPINE 2/3 VWS: CPT | Performed by: RADIOLOGY

## 2020-11-12 NOTE — PROGRESS NOTES
"Ivonne Gerard is a 85 year old female who is being evaluated via a billable telephone visit.      The patient has been notified of following:     \"This telephone visit will be conducted via a call between you and your physician/provider. We have found that certain health care needs can be provided without the need for a physical exam.  This service lets us provide the care you need with a short phone conversation.  If a prescription is necessary we can send it directly to your pharmacy.  If lab work is needed we can place an order for that and you can then stop by our lab to have the test done at a later time.    Telephone visits are billed at different rates depending on your insurance coverage. During this emergency period, for some insurers they may be billed the same as an in-person visit.  Please reach out to your insurance provider with any questions.    If during the course of the call the physician/provider feels a telephone visit is not appropriate, you will not be charged for this service.\"    Patient has given verbal consent for Telephone visit?  Yes    What phone number would you like to be contacted at? liban Alejandro [phone]    How would you like to obtain your AVS? [employer]    Phone call duration: 20 minutes    Gregory Beth PA-C     NEUROSURGERY CLINIC PROGRESS NOTE    DATE OF VISIT: 11/12/2020    HPI:     Ivonne Gerard is a pleasant 85 year old female who we initially evaluated on 09/23/2020 for a fracture through the L5 vertebral body including the anterior and middle columns and may extend into the left pedicle. This was classified as an unstable fracture of uncertain age. She was placed in to LSO. Today she tells me that she has been somewhat compliant with brace, but she has not been experiencing any symptoms, to include pain, what-so-ever.       Current Outpatient Medications   Medication     aspirin 81 MG tablet     atorvastatin (LIPITOR) 10 MG tablet     calcium " carbonate-vitamin D (OYSTER SHELL CALCIUM/D) 500-200 MG-UNIT tablet     diclofenac (VOLTAREN) 75 MG EC tablet     DULoxetine (CYMBALTA) 30 MG capsule     gabapentin (NEURONTIN) 600 MG tablet     IMODIUM A-D 2 MG PO TABS     omeprazole (PRILOSEC) 20 MG DR capsule     oxybutynin (DITROPAN) 5 MG tablet     oxyCODONE (ROXICODONE) 5 MG tablet     propranolol ER (INDERAL LA) 60 MG 24 hr capsule     tiZANidine (ZANAFLEX) 4 MG tablet     triamcinolone (KENALOG) 0.1 % ointment     gabapentin (NEURONTIN) 300 MG capsule     No current facility-administered medications for this visit.        Allergies   Allergen Reactions     Sulfa Drugs Hives       Past Medical History:   Diagnosis Date     Allergic rhinitis      Anxiety associated with depression      Arthritis      Bronchitis, mucopurulent recurrent (H)     Tested negative for asthma     Cataract      Diverticulosis      GERD (gastroesophageal reflux disease) 2/23/2011     Hypertension goal BP (blood pressure) < 140/90 5/30/2013     Iron deficiency anemia     Resolved     Major depression in complete remission (H) 9/7/2012     Major depression in partial remission (H) 3/13/2012     Osteopenia      Spinal stenosis        Review Of Systems    Skin: negative  Eyes: negative  Ears/Nose/Throat: negative  Respiratory: No shortness of breath, dyspnea on exertion, cough, or hemoptysis  Cardiovascular: negative  Gastrointestinal: negative  Musculoskeletal: negative  Neurologic: negative  Psychiatric: negative  Hematologic/Lymphatic/Immunologic: negative  Endocrine: negative    OBJECTIVE:    LMP  (LMP Unknown)     Imaging:    LUMBAR SPINE TWO TO THREE VIEWS   11/12/2020 10:04 AM    Findings notable for:  Degenerative scoliosis with lower thoracic upper lumbar  curvature to left is noted. The known L5 fracture is not  well-visualized in this exam but the L5 and S1 vertebral segments  appear to be adequately aligned. There is some upper lumbar kyphosis  with chronic biconcave deformity  of T12 and chronic anterior wedging  of L1. Multilevel degenerative disc and facet disease is present.  There is moderate kyphosis seen on this upright exam in the CT which  was done supine. If there is clinical concern for a more acute  fracture, CT of the lower thoracic and lumbar spine might be helpful  in further evaluation.    Radiographic Findings: Full radiological report in chart. I personally reviewed the images with the patient today.      PLAN:    Ivonne Gerard is approximately 6 weeks out from our initial evaluation for a fracture through the L5 vertebral body including the anterior and middle columns and may extend into the left pedicle. This was classified as an unstable fracture of uncertain age. Today the patient reports that she has been somewhat compliant with brace, but she has not been experiencing any symptoms, to include pain, what-so-ever.     Her updated cervical spine x-rays look good. We explained that they show the known L5 fracture to not be  well-visualized, but the L5 and S1 vertebral segments appear to be adequately aligned.    Since she is essentially completely asymptomatic, we can initiate a two week brace weaning program and have her return to clinic in six weeks with an updated lumbar spine CT WO.    The patient and her son, Jean-Pierre, gave verbal understanding and is in agreement with the above plan. They will call or return to the clinic for any worsening or changes in symptoms.    Respectfully,     CHONG Espinoza, PAKylerC      Reviewed the above information and findings with Dr. Crowe.

## 2020-11-12 NOTE — LETTER
"    11/12/2020         RE: Ivonne Gerard  [address]  [facility] [address]        Dear Colleague,    Thank you for referring your patient, Ivonne Gerard, to the [facility] NEUROLOGICAL [facility]. Please see a copy of my visit note below.    Ivonne Gerard is a 85 year old female who is being evaluated via a billable telephone visit.      The patient has been notified of following:     \"This telephone visit will be conducted via a call between you and your physician/provider. We have found that certain health care needs can be provided without the need for a physical exam.  This service lets us provide the care you need with a short phone conversation.  If a prescription is necessary we can send it directly to your pharmacy.  If lab work is needed we can place an order for that and you can then stop by our lab to have the test done at a later time.    Telephone visits are billed at different rates depending on your insurance coverage. During this emergency period, for some insurers they may be billed the same as an in-person visit.  Please reach out to your insurance provider with any questions.    If during the course of the call the physician/provider feels a telephone visit is not appropriate, you will not be charged for this service.\"    Patient has given verbal consent for Telephone visit?  Yes    What phone number would you like to be contacted at? liban Alejandro [phone]    How would you like to obtain your AVS? [employer]    Phone call duration: 20 minutes    Gregory Beth PA-C     NEUROSURGERY CLINIC PROGRESS NOTE    DATE OF VISIT: 11/12/2020    HPI:     Ivonne Gerard is a pleasant 85 year old female who we initially evaluated on 09/23/2020 for a fracture through the L5 vertebral body including the anterior and middle columns and may extend into the left pedicle. This was classified as an unstable fracture of uncertain age. She was placed in to O. Today she " tells me that she has been somewhat compliant with brace, but she has not been experiencing any symptoms, to include pain, what-so-ever.       Current Outpatient Medications   Medication     aspirin 81 MG tablet     atorvastatin (LIPITOR) 10 MG tablet     calcium carbonate-vitamin D (OYSTER SHELL CALCIUM/D) 500-200 MG-UNIT tablet     diclofenac (VOLTAREN) 75 MG EC tablet     DULoxetine (CYMBALTA) 30 MG capsule     gabapentin (NEURONTIN) 600 MG tablet     IMODIUM A-D 2 MG PO TABS     omeprazole (PRILOSEC) 20 MG DR capsule     oxybutynin (DITROPAN) 5 MG tablet     oxyCODONE (ROXICODONE) 5 MG tablet     propranolol ER (INDERAL LA) 60 MG 24 hr capsule     tiZANidine (ZANAFLEX) 4 MG tablet     triamcinolone (KENALOG) 0.1 % ointment     gabapentin (NEURONTIN) 300 MG capsule     No current facility-administered medications for this visit.        Allergies   Allergen Reactions     Sulfa Drugs Hives       Past Medical History:   Diagnosis Date     Allergic rhinitis      Anxiety associated with depression      Arthritis      Bronchitis, mucopurulent recurrent (H)     Tested negative for asthma     Cataract      Diverticulosis      GERD (gastroesophageal reflux disease) 2/23/2011     Hypertension goal BP (blood pressure) < 140/90 5/30/2013     Iron deficiency anemia     Resolved     Major depression in complete remission (H) 9/7/2012     Major depression in partial remission (H) 3/13/2012     Osteopenia      Spinal stenosis        Review Of Systems    Skin: negative  Eyes: negative  Ears/Nose/Throat: negative  Respiratory: No shortness of breath, dyspnea on exertion, cough, or hemoptysis  Cardiovascular: negative  Gastrointestinal: negative  Musculoskeletal: negative  Neurologic: negative  Psychiatric: negative  Hematologic/Lymphatic/Immunologic: negative  Endocrine: negative    OBJECTIVE:    LMP  (LMP Unknown)     Imaging:    LUMBAR SPINE TWO TO THREE VIEWS   11/12/2020 10:04 AM    Findings notable for:  Degenerative  scoliosis with lower thoracic upper lumbar  curvature to left is noted. The known L5 fracture is not  well-visualized in this exam but the L5 and S1 vertebral segments  appear to be adequately aligned. There is some upper lumbar kyphosis  with chronic biconcave deformity of T12 and chronic anterior wedging  of L1. Multilevel degenerative disc and facet disease is present.  There is moderate kyphosis seen on this upright exam in the CT which  was done supine. If there is clinical concern for a more acute  fracture, CT of the lower thoracic and lumbar spine might be helpful  in further evaluation.    Radiographic Findings: Full radiological report in chart. I personally reviewed the images with the patient today.      PLAN:    Ivonne Gerard is approximately 6 weeks out from our initial evaluation for a fracture through the L5 vertebral body including the anterior and middle columns and may extend into the left pedicle. This was classified as an unstable fracture of uncertain age. Today the patient reports that she has been somewhat compliant with brace, but she has not been experiencing any symptoms, to include pain, what-so-ever.     Her updated cervical spine x-rays look good. We explained that they show the known L5 fracture to not be  well-visualized, but the L5 and S1 vertebral segments appear to be adequately aligned.    Since she is essentially completely asymptomatic, we can initiate a two week brace weaning program and have her return to clinic in six weeks with an updated lumbar spine CT WO.    The patient and her son, Jean-Pierre, gave verbal understanding and is in agreement with the above plan. They will call or return to the clinic for any worsening or changes in symptoms.    Respectfully,     CHONG Espinoza PA-C      Reviewed the above information and findings with Dr. Crowe.      Again, thank you for allowing me to participate in the care of your patient.        Sincerely,        Gregory Sawant  MARELY Beth

## 2020-11-18 DIAGNOSIS — E78.5 HYPERLIPIDEMIA LDL GOAL <130: ICD-10-CM

## 2020-11-18 LAB
CHOLEST SERPL-MCNC: 128 MG/DL
HDLC SERPL-MCNC: 56 MG/DL
LDLC SERPL CALC-MCNC: 45 MG/DL
NONHDLC SERPL-MCNC: 72 MG/DL
TRIGL SERPL-MCNC: 134 MG/DL

## 2020-11-18 PROCEDURE — 36415 COLL VENOUS BLD VENIPUNCTURE: CPT | Performed by: FAMILY MEDICINE

## 2020-11-18 PROCEDURE — 80061 LIPID PANEL: CPT | Performed by: FAMILY MEDICINE

## 2020-11-25 ENCOUNTER — TELEPHONE (OUTPATIENT)
Dept: FAMILY MEDICINE | Facility: CLINIC | Age: 85
End: 2020-11-25

## 2020-11-25 DIAGNOSIS — F41.8 SITUATIONAL ANXIETY: ICD-10-CM

## 2020-11-25 RX ORDER — HYDROXYZINE HYDROCHLORIDE 25 MG/1
25 TABLET, FILM COATED ORAL 3 TIMES DAILY PRN
Qty: 30 TABLET | Refills: 3 | Status: SHIPPED | OUTPATIENT
Start: 2020-11-25 | End: 2021-03-23

## 2020-11-25 NOTE — TELEPHONE ENCOUNTER
Prior Authorization Approval    Authorization Effective Date: 8/27/2020  Authorization Expiration Date: 11/25/2021  Medication: hydrOXYzine (ATARAX) 25 MG tablet-APPROVED  Approved Dose/Quantity:  Reference #:     Insurance Company: Medicare Blue [employer] Phone [phone] Fax [phone]  Expected CoPay:       CoPay Card Available:      Foundation Assistance Needed:    Which Pharmacy is filling the prescription (Not needed for infusion/clinic administered): [id number] [place], [place] - [address]  Pharmacy Notified: Yes  Patient Notified: No    Pharmacy will notify patient when medication is ready.

## 2020-11-25 NOTE — TELEPHONE ENCOUNTER
hydrOXYzine (ATARAX) 25 MG tablet requires prior authorization. You may submit through this link.    [employer]CONEXANCE MD/login  KEY: [id number]  Patient Last name:  :          Rj Winn  [facility]

## 2020-11-25 NOTE — TELEPHONE ENCOUNTER
Central Prior Authorization Team   Phone: [phone]      PA Initiation    Medication: hydrOXYzine (ATARAX) 25 MG tablet-Initiated  Insurance Company: Medicare Blue - Phone [phone] Fax [phone]  Pharm[facility]illing the Rx[id number] [place], [place] - [address]  Filling Pharmacy Phone: [phone]  Filling Pharmacy Fax:    Start Date: 11/25/2020

## 2020-11-25 NOTE — TELEPHONE ENCOUNTER
.Reason for Call:  prescription    Detailed comments: requesting xanax, stated she recently(about one month ago) lost her son and about seven years ago in a similar situation, this medication was very helpful; [facility] [phone]  Thank you    Phone Number Patient can be reached at: Home number on file [phone] (home)    Best Time: anytime    Can we leave a detailed message on this number? YES    Call taken on 11/25/2020 at 8:31 AM by Radha Silverman

## 2020-11-25 NOTE — TELEPHONE ENCOUNTER
Please let patient know that I would like her to try hydroxyzine to help instead of the Xanax. The Xanax in combination with her pain medication, oxycodone, can be dangerous potentially causing breathing issues. If does not help, please have patient make a virtual or clinic appointment for further discussed for other options.    Steven Salinas MD

## 2020-11-25 NOTE — TELEPHONE ENCOUNTER
Spoke with patient on the phone. Reviewed provider message with her.  She verbalized understanding.    Noni Cortes RN

## 2020-12-02 ENCOUNTER — TELEPHONE (OUTPATIENT)
Dept: FAMILY MEDICINE | Facility: CLINIC | Age: 85
End: 2020-12-02

## 2020-12-02 NOTE — TELEPHONE ENCOUNTER
Spoke with patient on the phone. She was wondering what her cholesterol results were. Writer reviewed results with her from her 11/18/20 lab test.    Noni Cortes RN

## 2020-12-02 NOTE — TELEPHONE ENCOUNTER
Reason for Call:  Other call back    Detailed comments: pt states would like to discuss recent lab results.     Phone Number Patient can be reached at: Home number on file [phone] (home)    Best Time: asap    Can we leave a detailed message on this number? YES    Call taken on 12/2/2020 at 8:11 AM by Karolina Trivedi

## 2020-12-08 DIAGNOSIS — M25.521 PAIN IN JOINT, UPPER ARM, RIGHT: Primary | ICD-10-CM

## 2020-12-08 NOTE — TELEPHONE ENCOUNTER
Routing refill request to provider for review/approval because:  Drug not active on patient's medication list-patient stopped medication per note      Oxana Edwards RN, BSN, PHN

## 2020-12-14 DIAGNOSIS — S32.050S CLOSED COMPRESSION FRACTURE OF L5 LUMBAR VERTEBRA, SEQUELA: ICD-10-CM

## 2020-12-14 NOTE — TELEPHONE ENCOUNTER
Reason for Call:  Medication or medication refill:    Do you use a [facility]?  Name of the pharmacy and phone number for the current request:  [facility] [phone]    Name of the medication requested:   oxyCODONE (ROXICODONE) 5 MG tablet 5 mg, EVERY 6 HOURS PRN         Other request: none    Can we leave a detailed message on this number? YES    Phone number patient can be reached at: Home number on file [phone] (home)    Best Time: anytime    Call taken on 12/14/2020 at 1:43 PM by Radha Silverman

## 2020-12-16 RX ORDER — OXYCODONE HYDROCHLORIDE 5 MG/1
5 TABLET ORAL EVERY 6 HOURS PRN
Qty: 60 TABLET | Refills: 0 | Status: SHIPPED | OUTPATIENT
Start: 2020-12-16 | End: 2021-02-03

## 2020-12-17 DIAGNOSIS — M48.02 CERVICAL STENOSIS OF SPINAL CANAL: ICD-10-CM

## 2020-12-17 DIAGNOSIS — I10 ESSENTIAL HYPERTENSION WITH GOAL BLOOD PRESSURE LESS THAN 140/90: ICD-10-CM

## 2020-12-20 RX ORDER — PROPRANOLOL HCL 60 MG
CAPSULE, EXTENDED RELEASE 24HR ORAL
Qty: 90 CAPSULE | Refills: 1 | Status: SHIPPED | OUTPATIENT
Start: 2020-12-20 | End: 2021-03-23

## 2021-01-06 ENCOUNTER — TELEPHONE (OUTPATIENT)
Dept: FAMILY MEDICINE | Facility: CLINIC | Age: 86
End: 2021-01-06

## 2021-01-06 DIAGNOSIS — M48.02 CERVICAL STENOSIS OF SPINAL CANAL: ICD-10-CM

## 2021-01-06 DIAGNOSIS — S32.050S CLOSED COMPRESSION FRACTURE OF L5 LUMBAR VERTEBRA, SEQUELA: ICD-10-CM

## 2021-01-06 NOTE — TELEPHONE ENCOUNTER
Patient calling and needs a refill, her neurologist had changed patient's dosage to 60mg daily, the original rx states on 30mg daily in patient's med list. Patient wants to know if Dr. Salinas wants patient to take a different med or does he want patient to stay on the cymbalta 60mg. Patient cannot tell the difference with the dosage change. Please call patient to advise.  Huan Cespedes,  For [facility]

## 2021-01-07 RX ORDER — DULOXETIN HYDROCHLORIDE 60 MG/1
60 CAPSULE, DELAYED RELEASE ORAL DAILY
Qty: 90 CAPSULE | Refills: 3 | Status: SHIPPED | OUTPATIENT
Start: 2021-01-07 | End: 2022-01-04

## 2021-01-07 NOTE — TELEPHONE ENCOUNTER
Please notify patient that Cymbalta was refilled for the 60 mg daily. If no improvements of symptoms, patient should follow up with the neurologist for further recommendations.    Steven Salinas MD

## 2021-01-08 NOTE — TELEPHONE ENCOUNTER
Called pt and let her know that cymbalta was sent to her pharmacy and that if she does not see improvement in sxs to f/u with neurology

## 2021-01-14 ENCOUNTER — VIRTUAL VISIT (OUTPATIENT)
Dept: NEUROSURGERY | Facility: CLINIC | Age: 86
End: 2021-01-14
Payer: MEDICARE

## 2021-01-14 ENCOUNTER — ANCILLARY PROCEDURE (OUTPATIENT)
Dept: CT IMAGING | Facility: CLINIC | Age: 86
End: 2021-01-14
Attending: PHYSICIAN ASSISTANT
Payer: MEDICARE

## 2021-01-14 DIAGNOSIS — S32.058D OTHER CLOSED FRACTURE OF FIFTH LUMBAR VERTEBRA WITH ROUTINE HEALING, SUBSEQUENT ENCOUNTER: Primary | ICD-10-CM

## 2021-01-14 DIAGNOSIS — Z98.1 HISTORY OF LUMBAR SPINAL FUSION: ICD-10-CM

## 2021-01-14 PROCEDURE — 72131 CT LUMBAR SPINE W/O DYE: CPT | Mod: ME | Performed by: STUDENT IN AN ORGANIZED HEALTH CARE EDUCATION/TRAINING PROGRAM

## 2021-01-14 PROCEDURE — G1004 CDSM NDSC: HCPCS | Mod: GC | Performed by: STUDENT IN AN ORGANIZED HEALTH CARE EDUCATION/TRAINING PROGRAM

## 2021-01-14 PROCEDURE — 99442 PR PHYSICIAN TELEPHONE EVALUATION 11-20 MIN: CPT | Mod: 95 | Performed by: PHYSICIAN ASSISTANT

## 2021-01-14 NOTE — PROGRESS NOTES
Ivonne is a 85 year old who is being evaluated via a billable telephone visit.      What phone number would you like to be contacted at?   How would you like to obtain your AVS? Jaylon Savage     Ivonne is a 85 year old who presents to clinic today for the following health issues L5 fracture    HPI       Ivonne is an 85-year-old female who is following up today in regards to her L5 vertebral body fracture.  She had originally been seen for this problem back in August 2020, where imaging demonstrated fracture through the L5 vertebral body including the anterior and middle columns and the extended to the left pedicle.  She was placed in an LSO brace, and has been following up with us at intervals since.  At her last appointment, she was found to be virtually pain-free, and was weaned out of the brace.  She continues now with no pain in her back.  She has no radicular complaints.  She did get a follow-up CT scan today, which we are awaiting the results of.    Review of Systems   CONSTITUTIONAL: NEGATIVE for fever, chills, change in weight  ENT/MOUTH: NEGATIVE for ear, mouth and throat problems  RESP: NEGATIVE for significant cough or SOB  CV: NEGATIVE for chest pain, palpitations or peripheral edema      Objective           Vitals:  No vitals were obtained today due to virtual visit.    Physical Exam   healthy, alert and no distress  PSYCH: Alert and oriented times 3; coherent speech, normal   rate and volume, able to articulate logical thoughts, able   to abstract reason, no tangential thoughts, no hallucinations   or delusions  Her affect is normal and pleasant  RESP: No cough, no audible wheezing, able to talk in full sentences  Remainder of exam unable to be completed due to telephone visits      Assessment/plan:  Ivonne has done very well so far in regards to her L5 fracture.  Her pain has improved to the point where she does not really know any at this time.  She has no radicular pain or paresthesias.  Her CT  scan does appear to show at least interval healing of the L5 fracture that had been previously noted.  At this point, we would not recommend any additional follow-up, unless she develops any new or worsening symptoms, at which point we would be happy to see her again.  She can certainly contact us with any questions or concerns.  She voiced agreement and understanding.        Phone call duration: 10 minutes    Benjy Bernardo PA-C  [facility] Neurosurgery  [address] [address]    Tel [phone]  Pager [phone]

## 2021-01-14 NOTE — NURSING NOTE
January 14, 2021 10:34 AM  Ivonne Gerard is a 85 year old female who is being evaluated via a billable telephone visit.      What phone number would you like to be contacted at? [phone]   How would you like to obtain your AVS? Jaylon Lazo MA

## 2021-01-14 NOTE — LETTER
1/14/2021         RE: Ivonne Gerard  [address]  [place] [address]        Dear Colleague,    Thank you for referring your patient, Ivonne Gerard, to the [facility] NEUROLOGICAL CLINIC [facility]. Please see a copy of my visit note below.    Ivonne is a 85 year old who is being evaluated via a billable telephone visit.      What phone number would you like to be contacted at?   How would you like to obtain your AVS? MyChart      Subjective     Ivonne is a 85 year old who presents to clinic today for the following health issues L5 fracture    HPI       Ivonne is an 85-year-old female who is following up today in regards to her L5 vertebral body fracture.  She had originally been seen for this problem back in August 2020, where imaging demonstrated fracture through the L5 vertebral body including the anterior and middle columns and the extended to the left pedicle.  She was placed in an LSO brace, and has been following up with us at intervals since.  At her last appointment, she was found to be virtually pain-free, and was weaned out of the brace.  She continues now with no pain in her back.  She has no radicular complaints.  She did get a follow-up CT scan today, which we are awaiting the results of.    Review of Systems   CONSTITUTIONAL: NEGATIVE for fever, chills, change in weight  ENT/MOUTH: NEGATIVE for ear, mouth and throat problems  RESP: NEGATIVE for significant cough or SOB  CV: NEGATIVE for chest pain, palpitations or peripheral edema      Objective           Vitals:  No vitals were obtained today due to virtual visit.    Physical Exam   healthy, alert and no distress  PSYCH: Alert and oriented times 3; coherent speech, normal   rate and volume, able to articulate logical thoughts, able   to abstract reason, no tangential thoughts, no hallucinations   or delusions  Her affect is normal and pleasant  RESP: No cough, no audible wheezing, able to talk in full sentences  Remainder of  exam unable to be completed due to telephone visits      Assessment/plan:  Ivonne has done very well so far in regards to her L5 fracture.  Her pain has improved to the point where she does not really know any at this time.  She has no radicular pain or paresthesias.  Her CT scan does appear to show at least interval healing of the L5 fracture that had been previously noted.  At this point, we would not recommend any additional follow-up, unless she develops any new or worsening symptoms, at which point we would be happy to see her again.  She can certainly contact us with any questions or concerns.  She voiced agreement and understanding.        Phone call duration: 10 minutes    Benjy Bernardo PA-C  [facility] [place] [address]    Tel [phone]  Pager [phone]          Again, thank you for allowing me to participate in the care of your patient.        Sincerely,        Benjy Bernardo PA-C

## 2021-01-21 DIAGNOSIS — E78.5 HYPERLIPIDEMIA LDL GOAL <130: ICD-10-CM

## 2021-01-21 RX ORDER — ATORVASTATIN CALCIUM 10 MG/1
TABLET, FILM COATED ORAL
Qty: 90 TABLET | Refills: 2 | Status: SHIPPED | OUTPATIENT
Start: 2021-01-21 | End: 2021-10-15

## 2021-01-24 DIAGNOSIS — M48.061 SPINAL STENOSIS OF LUMBAR REGION WITHOUT NEUROGENIC CLAUDICATION: ICD-10-CM

## 2021-01-26 NOTE — TELEPHONE ENCOUNTER
Prescription approved per [facility] Refill Protocol.      Meera Davila RN, BSN, [facility]  [facility]

## 2021-02-03 DIAGNOSIS — S32.050S CLOSED COMPRESSION FRACTURE OF L5 LUMBAR VERTEBRA, SEQUELA: ICD-10-CM

## 2021-02-03 RX ORDER — OXYCODONE HYDROCHLORIDE 5 MG/1
5 TABLET ORAL EVERY 6 HOURS PRN
Qty: 60 TABLET | Refills: 0 | Status: SHIPPED | OUTPATIENT
Start: 2021-02-03 | End: 2021-03-23

## 2021-02-05 ENCOUNTER — TELEPHONE (OUTPATIENT)
Dept: FAMILY MEDICINE | Facility: CLINIC | Age: 86
End: 2021-02-05

## 2021-02-05 NOTE — TELEPHONE ENCOUNTER
Plan does not cover diclofenac (VOLTAREN) 1 % topical gel.  Please go to [email or web address] to initiate Prior Auth or change med.    Insurance type and ID number: Key:  [id number]      Additional Information:     Betty Emery

## 2021-02-08 NOTE — TELEPHONE ENCOUNTER
PRIOR AUTHORIZATION DENIED    Medication: diclofenac (VOLTAREN) 1 % topical gel-DENIED    Denial Date: 2/8/2021    Denial Rational: The medication is not covered for diagnosis. Diclofenac is covered for the diagnosis of Osteoarthritis pain in the joints such as feet, ankles, knees, hands, wrists and/or elbows.              Appeal Information:

## 2021-02-11 ENCOUNTER — TELEPHONE (OUTPATIENT)
Dept: FAMILY MEDICINE | Facility: CLINIC | Age: 86
End: 2021-02-11

## 2021-02-11 DIAGNOSIS — M48.02 CERVICAL STENOSIS OF SPINAL CANAL: ICD-10-CM

## 2021-02-11 NOTE — TELEPHONE ENCOUNTER
[facility]    Patient gets sever dry mouth from Tizanidine, would like script for Orphenadrine  MG Tablet

## 2021-02-12 ENCOUNTER — TELEPHONE (OUTPATIENT)
Dept: FAMILY MEDICINE | Facility: CLINIC | Age: 86
End: 2021-02-12

## 2021-02-12 RX ORDER — ORPHENADRINE CITRATE 100 MG/1
100 TABLET, EXTENDED RELEASE ORAL 2 TIMES DAILY PRN
Qty: 180 TABLET | Refills: 3 | Status: SHIPPED | OUTPATIENT
Start: 2021-02-12

## 2021-02-12 NOTE — TELEPHONE ENCOUNTER
Plan does not cover orphenadrine ER (NORFLEX) 100 MG 12 hr tablet.  Please call [phone] to initiate Prior Auth or change med.    Insurance type and ID number: ID# [id number]      Additional Information: This med was requested as an alternative, see 02/11/21 encounter    Betty Emery

## 2021-02-16 NOTE — TELEPHONE ENCOUNTER
Prior Authorization Approval    Authorization Effective Date: 11/18/2020  Authorization Expiration Date: 2/16/2022  Medication: orphenadrine ER (NORFLEX) 100 MG 12 hr tablet-APPROVED  Approved Dose/Quantity:   Reference #:     Insurance Company: Medicare Blue - Phone [phone] Fax [phone]  Expected CoPay:       CoPay Card Available:      Foundation Assistance Needed:    Which Pharmacy is filling the prescription (Not needed for infusion/clinic administered): [id number] [place], [place] - [address]  Pharmacy Notified: Yes  Patient Notified: No    Pharmacy will notify patient when medication is ready.

## 2021-02-16 NOTE — TELEPHONE ENCOUNTER
Central Prior Authorization Team   Phone: [phone]      PA Initiation    Medication: orphenadrine ER (NORFLEX) 100 MG 12 hr tablet-Initiated  Insurance Company: Medicare Blue - Phone [phone] Fax [phone]  Pharmacy [facility]illing the Rx[id number] - [place], [place] - [address]  Filling Pharmacy Phone: [phone]  Filling Pharmacy Fax:    Start Date: 2/16/2021

## 2021-03-02 DIAGNOSIS — M48.061 SPINAL STENOSIS OF LUMBAR REGION, UNSPECIFIED WHETHER NEUROGENIC CLAUDICATION PRESENT: ICD-10-CM

## 2021-03-02 RX ORDER — DICLOFENAC SODIUM 75 MG/1
TABLET, DELAYED RELEASE ORAL
Qty: 180 TABLET | Refills: 3 | Status: SHIPPED | OUTPATIENT
Start: 2021-03-02 | End: 2022-02-23

## 2021-03-02 NOTE — TELEPHONE ENCOUNTER
Routing refill request to provider for review/approval because:  Labs not current:    Lab Results   Component Value Date    ALT 14 07/26/2016     Lab Results   Component Value Date    WBC 8.9 10/05/2016     Lab Results   Component Value Date    RBC 3.99 10/05/2016     Lab Results   Component Value Date    HGB 10.9 10/05/2016     Lab Results   Component Value Date    HCT 35.1 10/05/2016     Lab Results   Component Value Date    MCV 88 10/05/2016     Lab Results   Component Value Date    MCH 27.3 10/05/2016     Lab Results   Component Value Date    MCHC 31.1 10/05/2016     Lab Results   Component Value Date    RDW 17.7 10/05/2016     Lab Results   Component Value Date     10/05/2016     BP Readings from Last 6 Encounters:   09/23/20 (!) 144/84   08/11/20 109/68   07/02/20 130/85   01/22/20 117/78   06/19/19 109/71   05/21/19 116/69     Mackenzie Fraser RN

## 2021-03-22 DIAGNOSIS — S32.050S CLOSED COMPRESSION FRACTURE OF L5 LUMBAR VERTEBRA, SEQUELA: ICD-10-CM

## 2021-03-22 RX ORDER — OXYCODONE HYDROCHLORIDE 5 MG/1
5 TABLET ORAL EVERY 6 HOURS PRN
Qty: 60 TABLET | Refills: 0 | Status: CANCELLED | OUTPATIENT
Start: 2021-03-22

## 2021-03-22 NOTE — TELEPHONE ENCOUNTER
Routing refill request to provider for review/approval because:  Drug not on the [facility] refill protocol     Veronica PHILLIPSN, RN

## 2021-03-23 ENCOUNTER — VIRTUAL VISIT (OUTPATIENT)
Dept: FAMILY MEDICINE | Facility: CLINIC | Age: 86
End: 2021-03-23
Payer: MEDICARE

## 2021-03-23 VITALS — SYSTOLIC BLOOD PRESSURE: 117 MMHG | DIASTOLIC BLOOD PRESSURE: 74 MMHG

## 2021-03-23 DIAGNOSIS — I10 ESSENTIAL HYPERTENSION WITH GOAL BLOOD PRESSURE LESS THAN 140/90: Primary | ICD-10-CM

## 2021-03-23 DIAGNOSIS — S32.050S CLOSED COMPRESSION FRACTURE OF L5 LUMBAR VERTEBRA, SEQUELA: ICD-10-CM

## 2021-03-23 PROCEDURE — 99442 PR PHYSICIAN TELEPHONE EVALUATION 11-20 MIN: CPT | Mod: 95 | Performed by: FAMILY MEDICINE

## 2021-03-23 RX ORDER — OXYCODONE HYDROCHLORIDE 5 MG/1
5 TABLET ORAL EVERY 6 HOURS PRN
Qty: 60 TABLET | Refills: 0 | Status: SHIPPED | OUTPATIENT
Start: 2021-03-23 | End: 2021-05-11

## 2021-03-23 RX ORDER — PROPRANOLOL HCL 60 MG
CAPSULE, EXTENDED RELEASE 24HR ORAL
Qty: 90 CAPSULE | Refills: 3 | Status: SHIPPED | OUTPATIENT
Start: 2021-03-23 | End: 2022-04-15

## 2021-03-23 NOTE — PROGRESS NOTES
Ivonne is a 85 year old who is being evaluated via a billable telephone visit.      What phone number would you like to be contacted at? home  How would you like to obtain your AVS? Letitiahart      Subjective   Ivonne is a 85 year old who presents for the following health issues:    HPI     Hypertension Follow-up      Do you check your blood pressure regularly outside of the clinic? Yes     Are you following a low salt diet? Yes    Are your blood pressures ever more than 140 on the top number (systolic) OR more   than 90 on the bottom number (diastolic), for example 140/90? No      Review of Systems   Constitutional, HEENT, cardiovascular, pulmonary, GI, , musculoskeletal, neuro, skin, endocrine and psych systems are negative, except as otherwise noted.      Objective           Vitals:  No vitals were obtained today due to virtual visit.    Physical Exam   healthy, alert and no distress  PSYCH: Alert and oriented times 3; coherent speech, normal   rate and volume, able to articulate logical thoughts, able   to abstract reason, no tangential thoughts, no hallucinations   or delusions  Her affect is normal  RESP: No cough, no audible wheezing, able to talk in full sentences  Remainder of exam unable to be completed due to telephone visits    A/P:    (I10) Essential hypertension with goal blood pressure less than 140/90  (primary encounter diagnosis)  Comment:   Plan: propranolol ER (INDERAL LA) 60 MG 24 hr capsule        Controlled. Continue with propranolol. Patient will f/u in 6 months for Medicare physical.    (S32.050S) Closed compression fracture of L5 lumbar vertebra, sequela  Comment:   Plan: oxyCODONE (ROXICODONE) 5 MG tablet        Needed refills. Stable.    Steven Salinas MD          Phone call duration: 11 minutes

## 2021-04-12 ENCOUNTER — NURSE TRIAGE (OUTPATIENT)
Dept: FAMILY MEDICINE | Facility: CLINIC | Age: 86
End: 2021-04-12
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
## 2021-04-15 DIAGNOSIS — K21.9 GASTROESOPHAGEAL REFLUX DISEASE WITHOUT ESOPHAGITIS: ICD-10-CM

## 2021-04-15 NOTE — TELEPHONE ENCOUNTER
"Requested Prescriptions   Pending Prescriptions Disp Refills     omeprazole (PRILOSEC) 20 MG DR capsule [Pharmacy Med Name: OMEPRAZOLE DR 20 MG CAPSULE] 90 capsule 3     Sig: TAKE 1 CAPSULE BY MOUTH EVERY DAY       PPI Protocol Failed - 4/15/2021 12:15 AM        Failed - No diagnosis of osteoporosis on record        Passed - Not on Clopidogrel (unless Pantoprazole ordered)        Passed - Recent (12 mo) or future (30 days) visit within the authorizing provider's specialty     Patient has had an office visit with the authorizing provider or a provider within the authorizing providers department within the previous 12 mos or has a future within next 30 days. See \"Patient Info\" tab in inbasket, or \"Choose Columns\" in Meds & Orders section of the refill encounter.            Passed - Medication is active on med list        Passed - Patient is age 18 or older        Passed - No active pregnacy on record        Passed - No positive pregnancy test in past 12 months           Veronica PHILLIPSN, RN    "

## 2021-04-19 ENCOUNTER — TELEPHONE (OUTPATIENT)
Dept: FAMILY MEDICINE | Facility: CLINIC | Age: 86
End: 2021-04-19
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
## 2021-04-21 ENCOUNTER — MEDICAL CORRESPONDENCE (OUTPATIENT)
Dept: FAMILY MEDICINE | Facility: CLINIC | Age: 86
End: 2021-04-21

## 2021-04-21 ENCOUNTER — OFFICE VISIT (OUTPATIENT)
Dept: FAMILY MEDICINE | Facility: CLINIC | Age: 86
End: 2021-04-21
Payer: MEDICARE

## 2021-04-21 VITALS
HEART RATE: 77 BPM | RESPIRATION RATE: 16 BRPM | TEMPERATURE: 98.5 F | OXYGEN SATURATION: 97 % | SYSTOLIC BLOOD PRESSURE: 122 MMHG | DIASTOLIC BLOOD PRESSURE: 72 MMHG

## 2021-04-21 DIAGNOSIS — L03.114 CELLULITIS OF LEFT ELBOW: Primary | ICD-10-CM

## 2021-04-21 PROCEDURE — 99213 OFFICE O/P EST LOW 20 MIN: CPT | Performed by: FAMILY MEDICINE

## 2021-04-21 RX ORDER — CEPHALEXIN 500 MG/1
500 CAPSULE ORAL
COMMUNITY
Start: 2021-04-12 | End: 2021-04-22

## 2021-04-21 NOTE — PROGRESS NOTES
"    {PROVIDER CHARTING PREFERENCE:661959}    Subjective   Ivonne is a 85 year old who presents for the following health issues {ACCOMPANIED BY STATEMENT (Optional):802704}    HPI       Hospital Follow-up Visit:    Hospital/Nursing Home/ Rehab Facility: [facility]  Date of Admission: ***  Date of Discharge: ***  Reason(s) for Admission: Elbow pain       Was your hospitalization related to COVID-19? {Yes add questions_No:420251}  Problems taking medications regularly:  {NONE DEFAULTED:167332::\"None\"}  Medication changes since discharge: {NONE DEFAULTED:982915::\"None\"}  Problems adhering to non-medication therapy:  {NONE DEFAULTED:253982::\"None\"}    Summary of hospitalization:  {HOSPITAL DISCHARGE SUMMARY INFO:137193::\"[facility] hospital discharge summary reviewed\"}  Diagnostic Tests/Treatments reviewed.  Follow up needed: {NONE DEFAULTED:561908::\"none\"}  Other Healthcare Providers Involved in Patient s Care:         {those currently involved after discharge:660172::\"None\"}  Update since discharge: {IMPROVED DEFAULT:282240::\"improved.\"} {TIP  Include information from family/caregivers, SNF, Care Coordination :047049}      Post Discharge Medication Reconciliation: {ACO Med Rec (Provider):375803}.  Plan of care communicated with {Communicate Plan to:127842::\"patient\"}     {Reference  Coding guidelines- Moderate Complexity F2F/Video within 7 - 14 days of discharge 12707, High Complexity F2F/Video within 7 days 48457 or tybeqi65 days 71881 :539385}         {additonal problems for provider to add (Optional):573812}    Review of Systems   {ROS COMP (Optional):672299}      Objective    LMP  (LMP Unknown)   There is no height or weight on file to calculate BMI.  Physical Exam   {Exam List (Optional):319489}    {Diagnostic Test Results (Optional):276637}    {AMBULATORY ATTESTATION (Optional):334398}          "
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
## 2021-04-21 NOTE — PATIENT INSTRUCTIONS
At [facility], we strive to deliver an exceptional experience to you, every time we see you. If you receive a survey, please complete it as we do value your feedback.  If you have MyChart, you can expect to receive results automatically within 24 hours of their completion.  Your provider will send a note interpreting your results as well.   If you do not have MyChart, you should receive your results in about a week by mail.    Your care team:                            Family Medicine Internal Medicine   MD Cr Singh MD Shantel Branch-Fleming, MD Srinivasa Vaka, MD Katya Belousova, PABENITO aMst, APRN CNP    Steven Salinas, MD Pediatrics   Tomi Sawant, PABENITO Jung, CNP MD Yolanda Castillo APRN CNP   MD Neetu Pereira MD Deborah Mielke, MD Nelly Riley, APRN [facility]      Clinic hours: Monday - Thursday 7 am-6 pm; Fridays 7 am-5 pm.   Urgent care: Monday - Friday 10 am- 8 pm; Saturday and Sunday 9 am-5 pm.    Clinic: [phone]       [facility]: Monday - Thursday 8 am - 7 pm; Friday 8 am - 6 pm  [facility]: [phone]     Use [email or web address] for 24/7 diagnosis and treatment of dozens of conditions.

## 2021-05-08 ENCOUNTER — HEALTH MAINTENANCE LETTER (OUTPATIENT)
Age: 86
End: 2021-05-08

## 2021-05-11 DIAGNOSIS — S32.050S CLOSED COMPRESSION FRACTURE OF L5 LUMBAR VERTEBRA, SEQUELA: ICD-10-CM

## 2021-05-11 RX ORDER — OXYCODONE HYDROCHLORIDE 5 MG/1
5 TABLET ORAL EVERY 6 HOURS PRN
Qty: 60 TABLET | Refills: 0 | Status: SHIPPED | OUTPATIENT
Start: 2021-05-11 | End: 2021-07-06

## 2021-05-11 NOTE — TELEPHONE ENCOUNTER
.Reason for call:  Medication   If this is a refill request, has the caller requested the refill from the pharmacy already? No  Will the patient be using a [facility]? No  [facility] the [facility]    Name of the medication requested: oxycodone    Other request: fill script and fax over to the pharmacy    Phone number to reach patient:  Home number on file [phone] (home)    Best Time:  anytime    Can we leave a detailed message on this number?  YES    Travel screening: Negative

## 2021-05-11 NOTE — TELEPHONE ENCOUNTER
Routing refill request to provider for review/approval because:  Drug not on the [facility] refill protocol     Larissa Vann RN

## 2021-07-06 DIAGNOSIS — S32.050S CLOSED COMPRESSION FRACTURE OF L5 LUMBAR VERTEBRA, SEQUELA: ICD-10-CM

## 2021-07-06 RX ORDER — OXYCODONE HYDROCHLORIDE 5 MG/1
5 TABLET ORAL EVERY 6 HOURS PRN
Qty: 60 TABLET | Refills: 0 | Status: SHIPPED | OUTPATIENT
Start: 2021-07-06 | End: 2021-08-26

## 2021-07-06 NOTE — TELEPHONE ENCOUNTER
Routing refill request to provider for review/approval because:  Drug not on the [facility] refill protocol     See phone encounter below     Augusta Pascual RN, BSN   [facility]

## 2021-07-06 NOTE — TELEPHONE ENCOUNTER
Reason for Call:  Medication or medication refill:    Do you use a [facility]?  Name of the pharmacy and phone number for the current request[facility] [place]    Name of the medication requested: oxyCODONE (ROXICODONE) 5 MG tablet    Other request: patient states this needs to be faxed over to the pharmacy    Can we leave a detailed message on this number? YES    Phone number patient can be reached at: Home number on file [phone] (home)    Best Time: any    Call taken on 7/6/2021 at 12:08 PM by Sofia Friedman

## 2021-08-26 DIAGNOSIS — S32.050S CLOSED COMPRESSION FRACTURE OF L5 LUMBAR VERTEBRA, SEQUELA: ICD-10-CM

## 2021-08-26 RX ORDER — OXYCODONE HYDROCHLORIDE 5 MG/1
5 TABLET ORAL EVERY 6 HOURS PRN
Qty: 60 TABLET | Refills: 0 | Status: SHIPPED | OUTPATIENT
Start: 2021-08-26 | End: 2021-10-25

## 2021-08-26 NOTE — TELEPHONE ENCOUNTER
.Reason for call:  Medication   If this is a refill request, has the caller requested the refill from the pharmacy already? No  Will the patient be using a [facility]? No  Name of the pharmacy and [facility]    Name of the medication requested: oxycodone    Other request: fill script.     Phone number to reach patient:  Home number on file [phone] (home)    Best Time:  anytime    Can we leave a detailed message on this number?  YES    Travel screening: Negative

## 2021-10-15 DIAGNOSIS — E78.5 HYPERLIPIDEMIA LDL GOAL <130: ICD-10-CM

## 2021-10-15 RX ORDER — ATORVASTATIN CALCIUM 10 MG/1
10 TABLET, FILM COATED ORAL DAILY
Qty: 90 TABLET | Refills: 0 | Status: SHIPPED | OUTPATIENT
Start: 2021-10-15 | End: 2021-11-23

## 2021-10-15 NOTE — TELEPHONE ENCOUNTER
Medication is being filled for 1 time refill only due to:  due for fasting labs in November. 90 day lebron refill given, per refill protocol.     Latasha Olivas RN  [facility]

## 2021-10-23 ENCOUNTER — HEALTH MAINTENANCE LETTER (OUTPATIENT)
Age: 86
End: 2021-10-23

## 2021-10-25 DIAGNOSIS — S32.050S CLOSED COMPRESSION FRACTURE OF L5 LUMBAR VERTEBRA, SEQUELA: ICD-10-CM

## 2021-10-25 RX ORDER — OXYCODONE HYDROCHLORIDE 5 MG/1
5 TABLET ORAL EVERY 6 HOURS PRN
Qty: 60 TABLET | Refills: 0 | Status: SHIPPED | OUTPATIENT
Start: 2021-10-25 | End: 2021-12-21

## 2021-10-25 NOTE — TELEPHONE ENCOUNTER
.Reason for call:  Medication   If this is a refill request, has the caller requested the refill from the pharmacy already? No  Will the patient be using a [facility]? No  Name of the pharmacy and [facility] the [facility]    Name of the medication requested: oxycodone    Other request: fill script    Phone number to reach patient:  Home number on file [phone] (home)    Best Time:  anytime    Can we leave a detailed message on this number?  YES    Travel screening: Negative

## 2021-10-28 DIAGNOSIS — M48.02 CERVICAL STENOSIS OF SPINAL CANAL: ICD-10-CM

## 2021-10-28 RX ORDER — GABAPENTIN 600 MG/1
600 TABLET ORAL 3 TIMES DAILY
Qty: 270 TABLET | Refills: 3 | Status: SHIPPED | OUTPATIENT
Start: 2021-10-28 | End: 2022-11-08

## 2021-11-23 DIAGNOSIS — E78.5 HYPERLIPIDEMIA LDL GOAL <130: ICD-10-CM

## 2021-11-23 RX ORDER — ATORVASTATIN CALCIUM 10 MG/1
10 TABLET, FILM COATED ORAL DAILY
Qty: 90 TABLET | Refills: 0 | Status: SHIPPED | OUTPATIENT
Start: 2021-11-23 | End: 2022-04-01

## 2021-11-23 NOTE — TELEPHONE ENCOUNTER
Routing refill request to provider for review/approval because:  Lisa given x1 and patient did not follow up, please advise

## 2021-12-21 DIAGNOSIS — S32.050S CLOSED COMPRESSION FRACTURE OF L5 LUMBAR VERTEBRA, SEQUELA: ICD-10-CM

## 2021-12-21 RX ORDER — OXYCODONE HYDROCHLORIDE 5 MG/1
5 TABLET ORAL EVERY 6 HOURS PRN
Qty: 60 TABLET | Refills: 0 | Status: SHIPPED | OUTPATIENT
Start: 2021-12-21 | End: 2022-06-01

## 2021-12-21 NOTE — TELEPHONE ENCOUNTER
Reason for Call: Request for an order or referral:    Order or referral being requested:      Date needed: Oxycodone 5 mg tablet    Has the patient been seen by the PCP for this problem? YES    Additional comments:  Patient has 3 tablets left and would like them by the holiday weekend 12/24/2021    Phone number Patient can be reached at:  Cell number on file:    Telephone Information:   Mobile [phone]       Best Time:  Anytime    Can we leave a detailed message on this number?  YES      If no answer call #[phone] Home phone    Call taken on 12/21/2021 at 11:54 AM by Jovita Guaman     [facility]

## 2022-01-02 DIAGNOSIS — M48.02 CERVICAL STENOSIS OF SPINAL CANAL: ICD-10-CM

## 2022-01-02 DIAGNOSIS — S32.050S CLOSED COMPRESSION FRACTURE OF L5 LUMBAR VERTEBRA, SEQUELA: ICD-10-CM

## 2022-01-04 RX ORDER — DULOXETIN HYDROCHLORIDE 60 MG/1
CAPSULE, DELAYED RELEASE ORAL
Qty: 90 CAPSULE | Refills: 3 | Status: SHIPPED | OUTPATIENT
Start: 2022-01-04 | End: 2022-05-26

## 2022-01-04 NOTE — TELEPHONE ENCOUNTER
Prescription approved per [facility] Refill Protocol.  Augusta Pascual RN, BSN   [facility]

## 2022-01-19 ENCOUNTER — TRANSFERRED RECORDS (OUTPATIENT)
Dept: HEALTH INFORMATION MANAGEMENT | Facility: CLINIC | Age: 87
End: 2022-01-19
Payer: MEDICARE

## 2022-02-18 ENCOUNTER — TRANSFERRED RECORDS (OUTPATIENT)
Dept: HEALTH INFORMATION MANAGEMENT | Facility: CLINIC | Age: 87
End: 2022-02-18
Payer: MEDICARE

## 2022-02-22 DIAGNOSIS — M48.061 SPINAL STENOSIS OF LUMBAR REGION WITHOUT NEUROGENIC CLAUDICATION: ICD-10-CM

## 2022-02-22 DIAGNOSIS — M48.061 SPINAL STENOSIS OF LUMBAR REGION, UNSPECIFIED WHETHER NEUROGENIC CLAUDICATION PRESENT: ICD-10-CM

## 2022-02-23 RX ORDER — DICLOFENAC SODIUM 75 MG/1
TABLET, DELAYED RELEASE ORAL
Qty: 180 TABLET | Refills: 3 | Status: SHIPPED | OUTPATIENT
Start: 2022-02-23 | End: 2023-04-10

## 2022-02-23 NOTE — TELEPHONE ENCOUNTER
"Requested Prescriptions   Pending Prescriptions Disp Refills    calcium carbonate-vitamin D (OS-OMARI WITH D) 500-200 MG-UNIT tablet [Pharmacy Med Name: LANG ANGUIANO OMARI-VIT D 500-200 TB] 90 tablet 3     Sig: TAKE 1 TABLET BY MOUTH EVERY DAY        Vitamin Supplements (Adult) Protocol Failed - 2/22/2022 12:27 AM        Failed - Medication is active on med list        Passed - High dose Vitamin D not ordered        Passed - Recent (12 mo) or future (30 days) visit within the authorizing provider's specialty     Patient has had an office visit with the authorizing provider or a provider within the authorizing providers department within the previous 12 mos or has a future within next 30 days. See \"Patient Info\" tab in inbasket, or \"Choose Columns\" in Meds & Orders section of the refill encounter.                 diclofenac (VOLTAREN) 75 MG EC tablet [Pharmacy Med Name: DICLOFENAC SOD EC 75 MG TAB] 180 tablet 3     Sig: TAKE 1 TABLET BY MOUTH TWICE A DAY        NSAID Medications Failed - 2/22/2022 12:27 AM        Failed - Normal ALT on file in past 12 months     Recent Labs   Lab Test 07/26/16  1351   ALT 14               Failed - Normal AST on file in past 12 months       Recent Labs   Lab Test 07/26/16  1351   AST 14             Failed - Patient is age 6-64 years        Failed - Normal CBC on file in past 12 months     Recent Labs   Lab Test 10/05/16  1226   WBC 8.9   RBC 3.99   HGB 10.9*   HCT 35.1   *                   Failed - Normal serum creatinine on file in past 12 months     Recent Labs   Lab Test 05/30/20  0000   CR 0.64       Ok to refill medication if creatinine is low          Passed - Blood pressure under 140/90 in past 12 months       BP Readings from Last 3 Encounters:   04/21/21 122/72   03/23/21 117/74   09/23/20 (!) 144/84                 Passed - Recent (12 mo) or future (30 days) visit within the authorizing provider's specialty     Patient has had an office visit with the authorizing provider " "or a provider within the authorizing providers department within the previous 12 mos or has a future within next 30 days. See \"Patient Info\" tab in inbasket, or \"Choose Columns\" in Meds & Orders section of the refill encounter.              Passed - Medication is active on med list        Passed - No active pregnancy on record        Passed - No positive pregnancy test in past 12 months              "

## 2022-03-11 ENCOUNTER — TELEPHONE (OUTPATIENT)
Dept: FAMILY MEDICINE | Facility: CLINIC | Age: 87
End: 2022-03-11
Payer: MEDICARE

## 2022-03-11 NOTE — TELEPHONE ENCOUNTER
cyclobenzaprine (FLEXERIL) 5 MG tablet requires prior authorization. Pa can be submitted through this link.    [email or web address]  KEY: [id number]              Rj Faarax   Radiology

## 2022-03-17 NOTE — TELEPHONE ENCOUNTER
Central Prior Authorization Team   Phone: [phone]      Prior Authorization Not Needed per Insurance    Medication: cyclobenzaprine (FLEXERIL) 5 MG tablet--NOT NEEDED (NO [facility] DOCTOR PRESCRIBED)  Insurance Company: [employer] Santamaria - Phone [phone] Fax [phone]  Expected CoPay:      Pharmacy Filling the Rx:    Pharmacy Notified: No  Patient Notified: Yes LEFT MESSAGE  [facility] hasn't prescribed this med. Not sure what pharmacy sent it to us. [id number] [place], [place] - [address] which is where she usually picks up.

## 2022-03-25 ENCOUNTER — TRANSFERRED RECORDS (OUTPATIENT)
Dept: HEALTH INFORMATION MANAGEMENT | Facility: CLINIC | Age: 87
End: 2022-03-25
Payer: MEDICARE

## 2022-04-01 DIAGNOSIS — E78.5 HYPERLIPIDEMIA LDL GOAL <130: ICD-10-CM

## 2022-04-01 RX ORDER — ATORVASTATIN CALCIUM 10 MG/1
10 TABLET, FILM COATED ORAL DAILY
Qty: 90 TABLET | Refills: 0 | Status: SHIPPED | OUTPATIENT
Start: 2022-04-01 | End: 2022-05-26

## 2022-04-01 NOTE — TELEPHONE ENCOUNTER
"Requested Prescriptions   Pending Prescriptions Disp Refills    atorvastatin (LIPITOR) 10 MG tablet [Pharmacy Med Name: ATORVASTATIN 10 MG TABLET] 90 tablet 0     Sig: TAKE 1 TABLET (10 MG) BY MOUTH DAILY ++DUE FOR FASTING LABS AND VISIT FOR ADDITIONAL REFILLS++        Statins Protocol Failed - 4/1/2022  1:36 AM        Failed - LDL on file in past 12 months     Recent Labs   Lab Test 11/18/20  1013   LDL 45               Passed - No abnormal creatine kinase in past 12 months     No lab results found.             Passed - Recent (12 mo) or future (30 days) visit within the authorizing provider's specialty     Patient has had an office visit with the authorizing provider or a provider within the authorizing providers department within the previous 12 mos or has a future within next 30 days. See \"Patient Info\" tab in inbasket, or \"Choose Columns\" in Meds & Orders section of the refill encounter.              Passed - Medication is active on med list        Passed - Patient is age 18 or older        Passed - No active pregnancy on record        Passed - No positive pregnancy test in past 12 months              "

## 2022-04-12 DIAGNOSIS — K21.9 GASTROESOPHAGEAL REFLUX DISEASE WITHOUT ESOPHAGITIS: ICD-10-CM

## 2022-04-13 NOTE — TELEPHONE ENCOUNTER
"Routing refill request to provider for review/approval because:  Requested Prescriptions   Pending Prescriptions Disp Refills    omeprazole (PRILOSEC) 20 MG DR capsule [Pharmacy Med Name: OMEPRAZOLE DR 20 MG CAPSULE] 90 capsule 3     Sig: TAKE 1 CAPSULE BY MOUTH EVERY DAY        PPI Protocol Failed - 4/12/2022 12:24 AM        Failed - No diagnosis of osteoporosis on record        Passed - Not on Clopidogrel (unless Pantoprazole ordered)        Passed - Recent (12 mo) or future (30 days) visit within the authorizing provider's specialty     Patient has had an office visit with the authorizing provider or a provider within the authorizing providers department within the previous 12 mos or has a future within next 30 days. See \"Patient Info\" tab in inbasket, or \"Choose Columns\" in Meds & Orders section of the refill encounter.              Passed - Medication is active on med list        Passed - Patient is age 18 or older        Passed - No active pregnacy on record        Passed - No positive pregnancy test in past 12 months                Jennifer SOTO, RN        "

## 2022-04-15 DIAGNOSIS — I10 ESSENTIAL HYPERTENSION WITH GOAL BLOOD PRESSURE LESS THAN 140/90: ICD-10-CM

## 2022-04-15 RX ORDER — PROPRANOLOL HCL 60 MG
CAPSULE, EXTENDED RELEASE 24HR ORAL
Qty: 90 CAPSULE | Refills: 0 | Status: SHIPPED | OUTPATIENT
Start: 2022-04-15 | End: 2022-05-26

## 2022-04-15 NOTE — TELEPHONE ENCOUNTER
Medication is being filled for 1 time refill only due to:  Almost 1 year since last visit   90 day lebron refill given, will need visit for any additional refills.      Latasha Olivas RN  [facility]

## 2022-04-25 ENCOUNTER — TELEPHONE (OUTPATIENT)
Dept: FAMILY MEDICINE | Facility: CLINIC | Age: 87
End: 2022-04-25
Payer: MEDICARE

## 2022-04-25 NOTE — TELEPHONE ENCOUNTER
Lindsey CHILDERS is calling from [facility] requesting VO to be approved for patient.    VO are as followed:     -Skilled nursing:  x2 a week for 1 week  x1 a week for 4 weeks     -Medication education      - Home health aid:  x1 a week for 6 weeks     -Shower assist    -OT/PT evaluations:    OT- Home and shower safety  Physical Therapy- ambulation and gait strengthening    Routing to provider to review and advise VO above.     Gladys Holcomb RN, BSN  [facility]

## 2022-04-26 ENCOUNTER — TELEPHONE (OUTPATIENT)
Dept: FAMILY MEDICINE | Facility: CLINIC | Age: 87
End: 2022-04-26
Payer: MEDICARE

## 2022-04-26 NOTE — TELEPHONE ENCOUNTER
Kaylah with home care called to request OT orders.    OT for 1 time a week for 1 week this week  2 times a week for 2 weeks  1 time a week for 2 weeks    Call [phone]      Routing to provider.    Raegan Dean RN  [facility]

## 2022-04-26 NOTE — TELEPHONE ENCOUNTER
Spoke with Lindsey regarding approval of home care orders. No further action required.     Deya Gabriel RN

## 2022-04-27 ENCOUNTER — MEDICAL CORRESPONDENCE (OUTPATIENT)
Dept: HEALTH INFORMATION MANAGEMENT | Facility: CLINIC | Age: 87
End: 2022-04-27
Payer: MEDICARE

## 2022-04-27 ENCOUNTER — TELEPHONE (OUTPATIENT)
Dept: FAMILY MEDICINE | Facility: CLINIC | Age: 87
End: 2022-04-27
Payer: MEDICARE

## 2022-04-27 DIAGNOSIS — I10 ESSENTIAL HYPERTENSION WITH GOAL BLOOD PRESSURE LESS THAN 140/90: Primary | ICD-10-CM

## 2022-04-27 DIAGNOSIS — M48.061 SPINAL STENOSIS OF LUMBAR REGION, UNSPECIFIED WHETHER NEUROGENIC CLAUDICATION PRESENT: ICD-10-CM

## 2022-04-27 DIAGNOSIS — G89.4 CHRONIC PAIN SYNDROME: ICD-10-CM

## 2022-04-27 DIAGNOSIS — E78.5 HYPERLIPIDEMIA LDL GOAL <130: ICD-10-CM

## 2022-04-27 NOTE — TELEPHONE ENCOUNTER
Patient Quality Outreach    Patient is due for the following:   Physical  Drug Urine screening   CSA letter update       NEXT STEPS:   Schedule a yearly physical   Need   PHQ 9   DAVID   Urine Drug screening   When pt calls back to schedule physical need to go to LAB 1st. At Appt she needs to update  a CSA letter, PHQ9 and DAVID         Type of outreach:    Phone, spoke to patient/parent. Patient/parent will call back to schedule.      Questions for provider review:    None     Vannessa Bustos MA  Chart routed to Provider.

## 2022-04-27 NOTE — Clinical Note
Dr. Salinas are you to future date a Drug Urine screening so when pt calls back to schedule physical she can have that done. She also needs to update a CSA letter at the time of visit.

## 2022-04-28 ENCOUNTER — TELEPHONE (OUTPATIENT)
Dept: FAMILY MEDICINE | Facility: CLINIC | Age: 87
End: 2022-04-28
Payer: MEDICARE

## 2022-04-28 NOTE — TELEPHONE ENCOUNTER
Elo with home care called to request orders for PT to start today.  And 1 time a week for 1 week  2 times a week for 2 weeks   1 time a week for 2 weeks.      [phone]      Relayed approval of orders.    Pt is established with provider      Raegan Dean RN  [facility]

## 2022-04-29 ENCOUNTER — MEDICAL CORRESPONDENCE (OUTPATIENT)
Dept: HEALTH INFORMATION MANAGEMENT | Facility: CLINIC | Age: 87
End: 2022-04-29
Payer: MEDICARE

## 2022-05-06 ENCOUNTER — TRANSFERRED RECORDS (OUTPATIENT)
Dept: HEALTH INFORMATION MANAGEMENT | Facility: CLINIC | Age: 87
End: 2022-05-06
Payer: MEDICARE

## 2022-05-17 ENCOUNTER — TELEPHONE (OUTPATIENT)
Dept: FAMILY MEDICINE | Facility: CLINIC | Age: 87
End: 2022-05-17
Payer: MEDICARE

## 2022-05-17 DIAGNOSIS — R21 RASH: Primary | ICD-10-CM

## 2022-05-17 RX ORDER — NYSTATIN 100000 [USP'U]/G
POWDER TOPICAL 3 TIMES DAILY PRN
Qty: 120 G | Refills: 11 | Status: SHIPPED | OUTPATIENT
Start: 2022-05-17

## 2022-05-17 NOTE — TELEPHONE ENCOUNTER
Wendy with home care called because pt is having redness under her abdominal fold on the left and right side.  Pt was previously receiving Nystatin powder at the TCU and it was working for her.    [phone]    Routing to provider.      Raegan Dean RN  [facility]

## 2022-05-18 ENCOUNTER — MEDICAL CORRESPONDENCE (OUTPATIENT)
Dept: HEALTH INFORMATION MANAGEMENT | Facility: CLINIC | Age: 87
End: 2022-05-18
Payer: MEDICARE

## 2022-05-24 ENCOUNTER — MEDICAL CORRESPONDENCE (OUTPATIENT)
Dept: HEALTH INFORMATION MANAGEMENT | Facility: CLINIC | Age: 87
End: 2022-05-24
Payer: MEDICARE

## 2022-05-24 ENCOUNTER — TELEPHONE (OUTPATIENT)
Dept: FAMILY MEDICINE | Facility: CLINIC | Age: 87
End: 2022-05-24
Payer: MEDICARE

## 2022-05-24 DIAGNOSIS — Z53.9 DIAGNOSIS NOT YET DEFINED: Primary | ICD-10-CM

## 2022-05-24 PROCEDURE — G0180 MD CERTIFICATION HHA PATIENT: HCPCS | Performed by: FAMILY MEDICINE

## 2022-05-24 NOTE — TELEPHONE ENCOUNTER
Adam, Physical Therapist with [facility] called.      Requesting Physical Therapy 1x per week for 3 weeks for Continue gait and Transfer assistance.    Routing to PCP to please review home care request.    Adam's cell is confidential.    Yaritza Henriquez RN, [facility]

## 2022-05-24 NOTE — TELEPHONE ENCOUNTER
This writer attempted to contact Adam on 05/24/22      Reason for call informed orders and left detailed message.      If patient calls back:   2nd floor [place] Care Team (MA/TC) called. Inform patient that someone from the team will contact them, document that pt called and route to care team.         Emmanuelle Goodrich MA

## 2022-05-26 ENCOUNTER — OFFICE VISIT (OUTPATIENT)
Dept: FAMILY MEDICINE | Facility: CLINIC | Age: 87
End: 2022-05-26
Payer: MEDICARE

## 2022-05-26 VITALS
OXYGEN SATURATION: 96 % | SYSTOLIC BLOOD PRESSURE: 137 MMHG | TEMPERATURE: 97.8 F | HEIGHT: 64 IN | BODY MASS INDEX: 32.27 KG/M2 | HEART RATE: 75 BPM | DIASTOLIC BLOOD PRESSURE: 68 MMHG

## 2022-05-26 DIAGNOSIS — G89.4 CHRONIC PAIN SYNDROME: ICD-10-CM

## 2022-05-26 DIAGNOSIS — I10 ESSENTIAL HYPERTENSION WITH GOAL BLOOD PRESSURE LESS THAN 140/90: ICD-10-CM

## 2022-05-26 DIAGNOSIS — S82.401A TIBIA/FIBULA FRACTURE, RIGHT, CLOSED, INITIAL ENCOUNTER: ICD-10-CM

## 2022-05-26 DIAGNOSIS — F41.8 ANXIETY ASSOCIATED WITH DEPRESSION: Primary | ICD-10-CM

## 2022-05-26 DIAGNOSIS — E78.5 HYPERLIPIDEMIA LDL GOAL <130: ICD-10-CM

## 2022-05-26 DIAGNOSIS — Z23 HIGH PRIORITY FOR 2019-NCOV VACCINE: ICD-10-CM

## 2022-05-26 DIAGNOSIS — S82.201A TIBIA/FIBULA FRACTURE, RIGHT, CLOSED, INITIAL ENCOUNTER: ICD-10-CM

## 2022-05-26 LAB
ALBUMIN SERPL-MCNC: 2.1 G/DL (ref 3.4–5)
ALP SERPL-CCNC: 94 U/L (ref 40–150)
ALT SERPL W P-5'-P-CCNC: 12 U/L (ref 0–50)
AMPHETAMINES UR QL: NOT DETECTED
ANION GAP SERPL CALCULATED.3IONS-SCNC: 5 MMOL/L (ref 3–14)
AST SERPL W P-5'-P-CCNC: 12 U/L (ref 0–45)
BARBITURATES UR QL SCN: NOT DETECTED
BENZODIAZ UR QL SCN: NOT DETECTED
BILIRUB SERPL-MCNC: 0.4 MG/DL (ref 0.2–1.3)
BUN SERPL-MCNC: 15 MG/DL (ref 7–30)
BUPRENORPHINE UR QL: NOT DETECTED
CALCIUM SERPL-MCNC: 7.9 MG/DL (ref 8.5–10.1)
CANNABINOIDS UR QL: NOT DETECTED
CHLORIDE BLD-SCNC: 110 MMOL/L (ref 94–109)
CHOLEST SERPL-MCNC: 103 MG/DL
CO2 SERPL-SCNC: 28 MMOL/L (ref 20–32)
COCAINE UR QL SCN: NOT DETECTED
CREAT SERPL-MCNC: 0.71 MG/DL (ref 0.52–1.04)
D-METHAMPHET UR QL: NOT DETECTED
FASTING STATUS PATIENT QL REPORTED: NO
GFR SERPL CREATININE-BSD FRML MDRD: 82 ML/MIN/1.73M2
GLUCOSE BLD-MCNC: 87 MG/DL (ref 70–99)
HDLC SERPL-MCNC: 65 MG/DL
LDLC SERPL CALC-MCNC: 21 MG/DL
METHADONE UR QL SCN: NOT DETECTED
NONHDLC SERPL-MCNC: 38 MG/DL
OPIATES UR QL SCN: NOT DETECTED
OXYCODONE UR QL SCN: DETECTED
PCP UR QL SCN: NOT DETECTED
POTASSIUM BLD-SCNC: 3.2 MMOL/L (ref 3.4–5.3)
PROPOXYPH UR QL: NOT DETECTED
PROT SERPL-MCNC: 5.5 G/DL (ref 6.8–8.8)
SODIUM SERPL-SCNC: 143 MMOL/L (ref 133–144)
TRICYCLICS UR QL SCN: NOT DETECTED
TRIGL SERPL-MCNC: 84 MG/DL

## 2022-05-26 PROCEDURE — 80306 DRUG TEST PRSMV INSTRMNT: CPT | Performed by: INTERNAL MEDICINE

## 2022-05-26 PROCEDURE — 99214 OFFICE O/P EST MOD 30 MIN: CPT | Mod: 25 | Performed by: INTERNAL MEDICINE

## 2022-05-26 PROCEDURE — 36415 COLL VENOUS BLD VENIPUNCTURE: CPT | Performed by: INTERNAL MEDICINE

## 2022-05-26 PROCEDURE — 80053 COMPREHEN METABOLIC PANEL: CPT | Performed by: INTERNAL MEDICINE

## 2022-05-26 PROCEDURE — 0064A COVID-19,PF,MODERNA (18+ YRS BOOSTER .25ML): CPT | Performed by: INTERNAL MEDICINE

## 2022-05-26 PROCEDURE — 82043 UR ALBUMIN QUANTITATIVE: CPT | Performed by: INTERNAL MEDICINE

## 2022-05-26 PROCEDURE — 91306 COVID-19,PF,MODERNA (18+ YRS BOOSTER .25ML): CPT | Performed by: INTERNAL MEDICINE

## 2022-05-26 PROCEDURE — 80061 LIPID PANEL: CPT | Performed by: INTERNAL MEDICINE

## 2022-05-26 RX ORDER — POTASSIUM CHLORIDE 1500 MG/1
20 TABLET, EXTENDED RELEASE ORAL
COMMUNITY
Start: 2022-03-25 | End: 2022-05-26

## 2022-05-26 RX ORDER — PROPRANOLOL HCL 60 MG
CAPSULE, EXTENDED RELEASE 24HR ORAL
Qty: 90 CAPSULE | Refills: 3 | Status: SHIPPED | OUTPATIENT
Start: 2022-05-26 | End: 2023-05-23

## 2022-05-26 RX ORDER — SERTRALINE HYDROCHLORIDE 100 MG/1
100 TABLET, FILM COATED ORAL DAILY
Qty: 90 TABLET | Refills: 3 | Status: SHIPPED | OUTPATIENT
Start: 2022-05-26 | End: 2023-08-08

## 2022-05-26 RX ORDER — ATORVASTATIN CALCIUM 10 MG/1
10 TABLET, FILM COATED ORAL DAILY
Qty: 90 TABLET | Refills: 3 | Status: SHIPPED | OUTPATIENT
Start: 2022-05-26 | End: 2023-07-25

## 2022-05-26 ASSESSMENT — PAIN SCALES - GENERAL: PAINLEVEL: SEVERE PAIN (7)

## 2022-05-26 ASSESSMENT — ANXIETY QUESTIONNAIRES
7. FEELING AFRAID AS IF SOMETHING AWFUL MIGHT HAPPEN: SEVERAL DAYS
2. NOT BEING ABLE TO STOP OR CONTROL WORRYING: SEVERAL DAYS
5. BEING SO RESTLESS THAT IT IS HARD TO SIT STILL: NOT AT ALL
IF YOU CHECKED OFF ANY PROBLEMS ON THIS QUESTIONNAIRE, HOW DIFFICULT HAVE THESE PROBLEMS MADE IT FOR YOU TO DO YOUR WORK, TAKE CARE OF THINGS AT HOME, OR GET ALONG WITH OTHER PEOPLE: NOT DIFFICULT AT ALL
6. BECOMING EASILY ANNOYED OR IRRITABLE: SEVERAL DAYS
1. FEELING NERVOUS, ANXIOUS, OR ON EDGE: SEVERAL DAYS
GAD7 TOTAL SCORE: 5
GAD7 TOTAL SCORE: 5
3. WORRYING TOO MUCH ABOUT DIFFERENT THINGS: SEVERAL DAYS

## 2022-05-26 ASSESSMENT — PATIENT HEALTH QUESTIONNAIRE - PHQ9
SUM OF ALL RESPONSES TO PHQ QUESTIONS 1-9: 0
SUM OF ALL RESPONSES TO PHQ QUESTIONS 1-9: 0
5. POOR APPETITE OR OVEREATING: NOT AT ALL

## 2022-05-26 NOTE — PROGRESS NOTES
Assessment & Plan     Hyperlipidemia LDL goal <130    - atorvastatin (LIPITOR) 10 MG tablet; Take 1 tablet (10 mg) by mouth daily  - Comprehensive metabolic panel (BMP + Alb, Alk Phos, ALT, AST, Total. Bili, TP)  - Lipid panel reflex to direct LDL Fasting    Essential hypertension with goal blood pressure less than 140/90  Blood pressure under good control continue the current regime  - propranolol ER (INDERAL LA) 60 MG 24 hr capsule; TAKE 1 CAPSULE BY MOUTH EVERY DAY  - Comprehensive metabolic panel (BMP + Alb, Alk Phos, ALT, AST, Total. Bili, TP)  - Albumin Random Urine Quantitative with Creat Ratio    Anxiety associated with depression  She was on Zoloft 50 mg before the fall and fracture and this was increased to 100 mg in the rehab  - sertraline (ZOLOFT) 100 MG tablet; Take 1 tablet (100 mg) by mouth daily    High priority for 2019-nCoV vaccine    - COVID-19,PF,MODERNA (18+ Yrs BOOSTER .25mL)    Chronic pain syndrome  She gets oxycodone from Dr. Salinas  - Drug Abuse Screen Panel 13, Urine (Pain Care Package) - lab collect    Tibia/fibula fracture, right, closed, initial encounter  She was hospitalized in January and it was transferred to a rehab place  She was out of rehab place a month ago  She was placed on Lasix in the rehab place for apparently some swelling of the lower extremities  When I examined her today there was some chronic stasis changes on both legs right greater than left  I do not think she needs Lasix  We will stop this along with potassium supplements    Also for reasons unclear she was not taking aspirin as this was stopped in the rehab place  She does have a questionable history of TIA in the past  I asked her to restart taking aspirin        30 minutes spent on the date of the encounter doing chart review, history and exam, documentation and further activities per the note           Return in about 3 months (around 8/26/2022).    Skinny Chamberlain MD  [facility]  "EMIL Coronado is a 86 year old who presents for the following health issues     HPI       Hospital Follow-up Visit:    Hospital/Nursing Home/IP Rehab Facility: [facility] in [place]  Date of Admission: January 5, 2022  Date of Discharge: April 16, 2022  Reason(s) for Admission: Broken bone      Was your hospitalization related to COVID-19? No   Problems taking medications regularly:  None  Medication changes since discharge: None  Problems adhering to non-medication therapy:  None    Summary of hospitalization:  See outside records, reviewed and scanned  Diagnostic Tests/Treatments reviewed.  Follow up needed: none  Other Healthcare Providers Involved in Patient s Care:         Rehab  Update since discharge: improved.       Post Discharge Medication Reconciliation: discharge medications reconciled and changed, per note/orders.  Plan of care communicated with patient                  Review of Systems   Constitutional, HEENT, cardiovascular, pulmonary, gi and gu systems are negative, except as otherwise noted.      Objective    /68   Pulse 75   Temp 97.8  F (36.6  C) (Tympanic)   Ht 1.626 m (5' 4\")   LMP  (LMP Unknown)   SpO2 96%   BMI 32.27 kg/m    Body mass index is 32.27 kg/m .  Physical Exam   GENERAL: healthy, alert and no distress  NECK: no adenopathy, no asymmetry, masses, or scars and thyroid normal to palpation  RESP: lungs clear to auscultation - no rales, rhonchi or wheezes  CV: regular rate and rhythm, normal S1 S2, no S3 or S4, no murmur, click or rub, no peripheral edema and peripheral pulses strong  ABDOMEN: soft, nontender, no hepatosplenomegaly, no masses and bowel sounds normal  MS: no gross musculoskeletal defects noted, no edema  SKIN: Chronic venous stasis changes of both lower extremities  Right greater than left                "

## 2022-05-27 LAB
CREAT UR-MCNC: 120 MG/DL
MICROALBUMIN UR-MCNC: 16 MG/L
MICROALBUMIN/CREAT UR: 13.33 MG/G CR (ref 0–25)

## 2022-05-31 DIAGNOSIS — S32.050S CLOSED COMPRESSION FRACTURE OF L5 LUMBAR VERTEBRA, SEQUELA: ICD-10-CM

## 2022-05-31 NOTE — TELEPHONE ENCOUNTER
Patient calling for a refill on oxyCODONE (ROXICODONE) 5 MG tablet to be sent to [facility] pharmacy.  [facility]  2nd Floor  Primary Care

## 2022-06-01 RX ORDER — OXYCODONE HYDROCHLORIDE 5 MG/1
5 TABLET ORAL DAILY PRN
Qty: 30 TABLET | Refills: 0 | Status: SHIPPED | OUTPATIENT
Start: 2022-06-01 | End: 2022-06-25

## 2022-06-01 NOTE — TELEPHONE ENCOUNTER
Routing refill request to provider for review/approval because:  Drug not on the [facility] refill protocol       Latasha Olivas RN  [facility]

## 2022-06-07 ENCOUNTER — MEDICAL CORRESPONDENCE (OUTPATIENT)
Dept: HEALTH INFORMATION MANAGEMENT | Facility: CLINIC | Age: 87
End: 2022-06-07
Payer: MEDICARE

## 2022-06-14 ENCOUNTER — TELEPHONE (OUTPATIENT)
Dept: FAMILY MEDICINE | Facility: CLINIC | Age: 87
End: 2022-06-14
Payer: MEDICARE

## 2022-06-14 NOTE — TELEPHONE ENCOUNTER
Adam, Physical therapist with [facility] calling to give an FYI to Dr. Steven Salinas.    Agency discharge completed due to patient completing goals.    Yaritza Henriquez RN, [facility]

## 2022-06-24 DIAGNOSIS — S32.050S CLOSED COMPRESSION FRACTURE OF L5 LUMBAR VERTEBRA, SEQUELA: ICD-10-CM

## 2022-06-24 RX ORDER — OXYCODONE HYDROCHLORIDE 5 MG/1
5 TABLET ORAL DAILY PRN
Qty: 30 TABLET | Refills: 0 | Status: CANCELLED | OUTPATIENT
Start: 2022-06-24

## 2022-06-24 NOTE — TELEPHONE ENCOUNTER
Routing refill request to provider for review/approval because:  Drug or supplies are not on the [facility] refill protocol     Veronica PHILLIPSN, RN

## 2022-06-24 NOTE — TELEPHONE ENCOUNTER
.Reason for call:  Medication   If this is a refill request, has the caller requested the refill from the pharmacy already? No  Will the patient be using a [facility]? No  [facility] of [facility]    Name of the medication requested: oxycodone    Other request: fill script    Phone number to reach patient:  Home number on file [phone] (home)    Best Time:  anytime    Can we leave a detailed message on this number?  YES    Travel screening: Negative

## 2022-06-25 RX ORDER — OXYCODONE HYDROCHLORIDE 5 MG/1
5 TABLET ORAL DAILY PRN
Qty: 30 TABLET | Refills: 0 | Status: SHIPPED | OUTPATIENT
Start: 2022-06-28 | End: 2022-07-25

## 2022-06-29 ENCOUNTER — TRANSFERRED RECORDS (OUTPATIENT)
Dept: FAMILY MEDICINE | Facility: CLINIC | Age: 87
End: 2022-06-29

## 2022-07-05 ENCOUNTER — TELEPHONE (OUTPATIENT)
Dept: FAMILY MEDICINE | Facility: CLINIC | Age: 87
End: 2022-07-05

## 2022-07-05 NOTE — TELEPHONE ENCOUNTER
Provider: NIC.  Please see below when you have a chance.  Thank you. Christy Hewitt R.N.    Patient reports that she usually gets # 60 of her oxyCODONE (ROXICODONE) 5 MG tablet.  Dr. Salinas was not in at a time she needed a refill and she had to see someone else.  This Prescription(s) got changed to # 30 and now just got refilled again for #30 - was usually getting # 60.  She is requesting in the future if Dr. Steven Salinas would refill her oxyCODONE (ROXICODONE) 5 MG tablet for #60 as occasionally she needs to take another one in the evening if her pain is too much. A Prescription(s) for #60 lasts her about 1.5 months.  She last picked up oxyCODONE (ROXICODONE) 5 MG tablet on 7/2/2022 - per patient.  She is not asking for a refill now just consideration for #60 when the next refill is due.  She does not need a call back - per patient.

## 2022-07-25 DIAGNOSIS — S32.050S CLOSED COMPRESSION FRACTURE OF L5 LUMBAR VERTEBRA, SEQUELA: ICD-10-CM

## 2022-07-25 RX ORDER — OXYCODONE HYDROCHLORIDE 5 MG/1
5 TABLET ORAL DAILY PRN
Qty: 30 TABLET | Refills: 0 | Status: SHIPPED | OUTPATIENT
Start: 2022-07-25 | End: 2022-08-29

## 2022-07-25 NOTE — TELEPHONE ENCOUNTER
.Reason for call:  Medication   If this is a refill request, has the caller requested the refill from the pharmacy already? No  Will the patient be using a [facility]? No  [facility] of the pharmacy [facility] the [facility]    Name of the medication requested: Oxycodone    Other request: fill script    Phone number to reach patient:  Home number on file [phone] (home)    Best Time:  anytime    Can we leave a detailed message on this number?  YES    Travel screening: Negative

## 2022-08-29 DIAGNOSIS — S32.050S CLOSED COMPRESSION FRACTURE OF L5 LUMBAR VERTEBRA, SEQUELA: ICD-10-CM

## 2022-08-29 RX ORDER — OXYCODONE HYDROCHLORIDE 5 MG/1
5 TABLET ORAL DAILY PRN
Qty: 30 TABLET | Refills: 0 | Status: SHIPPED | OUTPATIENT
Start: 2022-08-29 | End: 2022-09-26

## 2022-08-29 NOTE — TELEPHONE ENCOUNTER
Patient calling for a refill on oxyCODONE (ROXICODONE) 5 MG tablet to be sent to [facility] pharmacy.  [facility]  2nd Floor  Primary Care

## 2022-09-26 DIAGNOSIS — S32.050S CLOSED COMPRESSION FRACTURE OF L5 LUMBAR VERTEBRA, SEQUELA: ICD-10-CM

## 2022-09-26 RX ORDER — OXYCODONE HYDROCHLORIDE 5 MG/1
5 TABLET ORAL DAILY PRN
Qty: 30 TABLET | Refills: 0 | Status: SHIPPED | OUTPATIENT
Start: 2022-09-26 | End: 2022-10-27

## 2022-09-26 NOTE — TELEPHONE ENCOUNTER
Patient calling for a refill on oxyCODONE (ROXICODONE) 5 MG tablet to be sent to [facility] pharmacy.  [facility]  2nd Floor  Primary Care

## 2022-10-09 ENCOUNTER — HEALTH MAINTENANCE LETTER (OUTPATIENT)
Age: 87
End: 2022-10-09

## 2022-10-27 DIAGNOSIS — S32.050S CLOSED COMPRESSION FRACTURE OF L5 LUMBAR VERTEBRA, SEQUELA: ICD-10-CM

## 2022-10-27 RX ORDER — OXYCODONE HYDROCHLORIDE 5 MG/1
5 TABLET ORAL DAILY PRN
Qty: 30 TABLET | Refills: 0 | Status: SHIPPED | OUTPATIENT
Start: 2022-10-27 | End: 2022-11-30

## 2022-10-27 NOTE — TELEPHONE ENCOUNTER
Medication Question or Refill    Contacts       Type Contact Phone/Fax    10/27/2022 11:12 AM CDT Phone (Incoming) Ivonne Gerard (Self) [phone] (H)          What medication are you calling about (include dose and sig)?:oxyCODONE (ROXICODONE) 5 MG tablet     Controlled Substance Agreement on file:   CSA -- Patient Level:    CSA: None found at the patient level.       Who prescribed the medication?: Dr Salinas    Do you need a refill? Yes:     Patient offered an appointment? No    Do you have any questions or concerns?  Yes: Patient requesting # 60    Preferred Pharmacy:   [facility]/pharmacy #[id number] [facility] [address] [facility]  [address] [place] [address]  Phone: [phone] Fax: [phone]    Could we send this information to you [facility] or would you prefer to receive a phone call?:   No preference   Okay to leave a detailed message?: Yes at Home number on file [phone] (home)    Beth Hope MA  [facility]  2nd Floor  Primary Care

## 2022-11-08 DIAGNOSIS — M48.02 CERVICAL STENOSIS OF SPINAL CANAL: ICD-10-CM

## 2022-11-08 RX ORDER — GABAPENTIN 600 MG/1
TABLET ORAL
Qty: 270 TABLET | Refills: 3 | Status: SHIPPED | OUTPATIENT
Start: 2022-11-08 | End: 2023-12-06

## 2022-11-30 DIAGNOSIS — S32.050S CLOSED COMPRESSION FRACTURE OF L5 LUMBAR VERTEBRA, SEQUELA: ICD-10-CM

## 2022-11-30 RX ORDER — OXYCODONE HYDROCHLORIDE 5 MG/1
5 TABLET ORAL 2 TIMES DAILY PRN
Qty: 60 TABLET | Refills: 0 | Status: SHIPPED | OUTPATIENT
Start: 2022-11-30 | End: 2023-01-24

## 2023-01-24 DIAGNOSIS — S32.050S CLOSED COMPRESSION FRACTURE OF L5 LUMBAR VERTEBRA, SEQUELA: ICD-10-CM

## 2023-01-24 RX ORDER — OXYCODONE HYDROCHLORIDE 5 MG/1
5 TABLET ORAL 2 TIMES DAILY PRN
Qty: 60 TABLET | Refills: 0 | Status: SHIPPED | OUTPATIENT
Start: 2023-01-24 | End: 2023-03-08

## 2023-01-24 NOTE — TELEPHONE ENCOUNTER
Reason for call:  Medication   If this is a refill request, has the caller requested the refill from the pharmacy already? No  Will the patient be using a [facility]? No  [facility]     Name of the medication requested: see below     Other request: n/a     Phone number to reach patient:  Cell number on file:    Telephone Information:   Mobile [phone]       Best Time:  Anytime     Can we leave a detailed message on this number?  YES    Travel screening: Not Applicable

## 2023-03-08 ENCOUNTER — TELEPHONE (OUTPATIENT)
Dept: FAMILY MEDICINE | Facility: CLINIC | Age: 88
End: 2023-03-08
Payer: MEDICARE

## 2023-03-08 DIAGNOSIS — S32.050S CLOSED COMPRESSION FRACTURE OF L5 LUMBAR VERTEBRA, SEQUELA: ICD-10-CM

## 2023-03-08 RX ORDER — OXYCODONE HYDROCHLORIDE 5 MG/1
5 TABLET ORAL 2 TIMES DAILY PRN
Qty: 60 TABLET | Refills: 0 | Status: SHIPPED | OUTPATIENT
Start: 2023-03-08 | End: 2023-04-21

## 2023-03-08 NOTE — TELEPHONE ENCOUNTER
[facility] phone call message- patient requests medication or medication refill: refill    If this is a refill request, has the caller requested the refill from the pharmacy already? No  Name of the pharmacy and phone number for the current request:  [facility]/pharmacy #[id number] - [place], [place] - [address] [facility]     Name of the medication requested: oxyCODONE (ROXICODONE) 5 MG tablet    Other request:     OK to leave the result message on voice mail or with a family member? YES    par Call taken on 3/8/2023 at 9:35 AM by Yossi Saenz MA

## 2023-04-07 DIAGNOSIS — M48.061 SPINAL STENOSIS OF LUMBAR REGION, UNSPECIFIED WHETHER NEUROGENIC CLAUDICATION PRESENT: ICD-10-CM

## 2023-04-10 RX ORDER — DICLOFENAC SODIUM 75 MG/1
TABLET, DELAYED RELEASE ORAL
Qty: 180 TABLET | Refills: 3 | Status: SHIPPED | OUTPATIENT
Start: 2023-04-10 | End: 2024-09-17

## 2023-04-20 ENCOUNTER — TELEPHONE (OUTPATIENT)
Dept: FAMILY MEDICINE | Facility: CLINIC | Age: 88
End: 2023-04-20
Payer: MEDICARE

## 2023-04-20 NOTE — TELEPHONE ENCOUNTER
Jennifer With [facility] calling to request orders.    State that pt was discharged from TCU and need orders to delay start of care for 4/24.    Is requesting to have order approval by today.      Raegan Dean RN  [facility]

## 2023-04-21 DIAGNOSIS — S32.050S CLOSED COMPRESSION FRACTURE OF L5 LUMBAR VERTEBRA, SEQUELA: ICD-10-CM

## 2023-04-21 RX ORDER — OXYCODONE HYDROCHLORIDE 5 MG/1
5 TABLET ORAL 2 TIMES DAILY PRN
Qty: 60 TABLET | Refills: 0 | Status: SHIPPED | OUTPATIENT
Start: 2023-04-21 | End: 2023-06-28

## 2023-04-21 NOTE — TELEPHONE ENCOUNTER
Medication Question or Refill    Contacts       Type Contact Phone/Fax    04/21/2023 12:14 PM CDT Phone (Incoming) Ivonne Gerard (Self) [phone] (H)          What medication are you calling about (include dose and sig)?: oxyCODONE (ROXICODONE) 5 MG tablet    Preferred Pharmacy:  [facility]/pharmacy #[id number] [facility] [address] [facility]  [address] [place] [address]  Phone: [phone] Fax: [phone]      Controlled Substance Agreement on file:   CSA -- Patient Level:    CSA: None found at the patient level.       Who prescribed the medication?: Dr Steven Salinas    Do you need a refill? Yes    Patient offered an appointment? No    Do you have any questions or concerns?  No      Could we send this information to you in [facility] or would you prefer to receive a phone call?:   Patient would prefer a phone call   Okay to leave a detailed message?: Yes at Home number on file [phone] (home)    Beth Hope MA  [facility]   Primary Care

## 2023-04-24 ENCOUNTER — TELEPHONE (OUTPATIENT)
Dept: FAMILY MEDICINE | Facility: CLINIC | Age: 88
End: 2023-04-24
Payer: MEDICARE

## 2023-04-24 ENCOUNTER — MEDICAL CORRESPONDENCE (OUTPATIENT)
Dept: HEALTH INFORMATION MANAGEMENT | Facility: CLINIC | Age: 88
End: 2023-04-24

## 2023-04-24 NOTE — TELEPHONE ENCOUNTER
The Home Care/Assisted Living/Nursing Facility is calling regarding an established patient.  Has the patient seen Home Care in the past or is currently residing in Assisted Living or Nursing Facility? Yes.     Estefany calling from [facility] requesting the following orders that are within the Home Care, Assisted Living or Nursing Home Eval and Treatment standing order and can be signed as standing order signature required by RN.    Preferred Call Back Number: [phone]    Home Care Visits Continuation and PT/OT/Speech Therapy    Any additional Orders:  Are there any orders requested, not stated above, that are outside of the standing order and must be routed to a licensed practitioner for approval?    Yes:       Skilled nursing 1 time a week for 4 weeks.   - 3 PRN visits   -Home hemant aid 1 time a week for 4 weeks.  Pt and OT eval and treat            Raegan Dean RN  [facility]

## 2023-04-25 ENCOUNTER — MEDICAL CORRESPONDENCE (OUTPATIENT)
Dept: HEALTH INFORMATION MANAGEMENT | Facility: CLINIC | Age: 88
End: 2023-04-25
Payer: MEDICARE

## 2023-04-25 NOTE — TELEPHONE ENCOUNTER
This writer attempted to contact LISETH Allison Case Manager on 04/25/23      Reason for call relay message from provider below and left detailed message letting her know Dr. Salinas has approved verbal orders for:  Skilled nursing 1 time a week for 4 weeks.              - 3 PRN visits              -Home hemant aid 1 time a week for 4 weeks.  Pt and OT eval and treat      Yaritza Neal RN

## 2023-04-25 NOTE — TELEPHONE ENCOUNTER
The Home Care/Assisted Living/Nursing Facility is calling regarding an established patient.  Has the patient seen Home Care in the past or is currently residing in Assisted Living or Nursing Facility? Yes.     Kaylah PUENTE calling from [facility] requesting the following orders that are within the Home Care, Assisted Living or Nursing Home Eval and Treatment standing order and can be signed as standing order signature required by RN.    Preferred Call Back Number: [phone]    PT/OT/Speech Therapy- OT 1x/week for 4 weeks; approval given.    Any additional Orders:  Are there any orders requested, not stated above, that are outside of the standing order and must be routed to a licensed practitioner for approval?    No    Nelly Jackson RN    [facility]

## 2023-04-26 ENCOUNTER — MEDICAL CORRESPONDENCE (OUTPATIENT)
Dept: HEALTH INFORMATION MANAGEMENT | Facility: CLINIC | Age: 88
End: 2023-04-26
Payer: MEDICARE

## 2023-04-26 ENCOUNTER — PATIENT OUTREACH (OUTPATIENT)
Dept: CARE COORDINATION | Facility: CLINIC | Age: 88
End: 2023-04-26
Payer: MEDICARE

## 2023-04-26 ENCOUNTER — TELEPHONE (OUTPATIENT)
Dept: FAMILY MEDICINE | Facility: CLINIC | Age: 88
End: 2023-04-26
Payer: MEDICARE

## 2023-04-26 NOTE — TELEPHONE ENCOUNTER
The Home Care/Assisted Living/Nursing Facility is calling regarding an established patient.    Has the patient seen Home Care in the past or is currently residing in Assisted Living or Nursing Facility? Yes.     NANI Moise calling from [facility] requesting the following orders that are within the Home Care, Assisted Living or Nursing Home Eval and Treatment standing order and can be signed as standing order signature required by RN.    Preferred Call Back Number: [phone]    PT/OT/Speech Therapy   - PT: 2x/week for 4 weeks, then 1x/week for 3 weeks      Any additional Orders:  Are there any orders requested, not stated above, that are outside of the standing order and must be routed to a licensed practitioner for approval?    No    Writer has verified Requestor will send fax to have orders signed.        CHARLES Gomes, RN  [facility]

## 2023-05-03 ENCOUNTER — MEDICAL CORRESPONDENCE (OUTPATIENT)
Dept: HEALTH INFORMATION MANAGEMENT | Facility: CLINIC | Age: 88
End: 2023-05-03
Payer: MEDICARE

## 2023-05-06 DIAGNOSIS — K21.9 GASTROESOPHAGEAL REFLUX DISEASE WITHOUT ESOPHAGITIS: ICD-10-CM

## 2023-05-10 ENCOUNTER — PATIENT OUTREACH (OUTPATIENT)
Dept: CARE COORDINATION | Facility: CLINIC | Age: 88
End: 2023-05-10
Payer: MEDICARE

## 2023-05-12 ENCOUNTER — MEDICAL CORRESPONDENCE (OUTPATIENT)
Dept: HEALTH INFORMATION MANAGEMENT | Facility: CLINIC | Age: 88
End: 2023-05-12
Payer: MEDICARE

## 2023-05-16 ENCOUNTER — TELEPHONE (OUTPATIENT)
Dept: FAMILY MEDICINE | Facility: CLINIC | Age: 88
End: 2023-05-16
Payer: MEDICARE

## 2023-05-16 NOTE — TELEPHONE ENCOUNTER
"LISETH Stevens with [facility], calling to report some medication discrepancies since pt return home after hospital/TCU discharge. Per Hilda, the following changes were made while patient was in hospital/TCU:    Pt was started on Lasix 40 mg daily while in TCU, but pt has not been taking this coming home. Pt states she did have some leg swelling while in hospital, but no longer has any. Hilda denies that pt has any edema.     Propranolol ER (INDERAL LA) 60 MG 24 hr capsule was discontinued and lisinopril 20 mg daily was started during hospitalization. Pt states she never received lisinopril from pharmacy after returning home, so she went back to taking propranolol ER 60 mg daily. Pt's blood pressures have been running 100-130/70s, pulse in 70s on propranolol ER 60 mg since discharge.     Hilda is asking if pt should continue with what she's been doing since discharge (no lasix, propranolol ER 60 mg) or should she start what discharge instructions stated (lasix 40 mg daily and lisinopril 20 mg daily)?     Hilda encouraged pt to schedule hospital f/u with provider. This RN contacted pt and scheduled hospital f/u for 5/23 with PCP. Pt says she discharged from TCU 1-2 weeks ago and has been doing \"great\".     RN to call Hilda back with provider response.     Deya Gabriel RN  "

## 2023-05-16 NOTE — TELEPHONE ENCOUNTER
RN spoke with Hilda, [facility] RN, regarding provider message below. Hilda verbalized understanding.     Deya Gabriel RN

## 2023-05-16 NOTE — TELEPHONE ENCOUNTER
Sounds like patient is stable. No leg edema and bp's are at goal. Okay to continue what patient is doing. No need for the Lasix and to continue with propranolol. We'll see patient at schedule visit for recheck.    Steven Salinas MD

## 2023-05-23 ENCOUNTER — VIRTUAL VISIT (OUTPATIENT)
Dept: FAMILY MEDICINE | Facility: CLINIC | Age: 88
End: 2023-05-23
Payer: MEDICARE

## 2023-05-23 DIAGNOSIS — I10 ESSENTIAL HYPERTENSION WITH GOAL BLOOD PRESSURE LESS THAN 140/90: ICD-10-CM

## 2023-05-23 DIAGNOSIS — E86.1 HYPOTENSION DUE TO HYPOVOLEMIA: Primary | ICD-10-CM

## 2023-05-23 DIAGNOSIS — I44.1 AV BLOCK, MOBITZ 1: ICD-10-CM

## 2023-05-23 PROCEDURE — 99441 PR PHYSICIAN TELEPHONE EVALUATION 5-10 MIN: CPT | Mod: 95 | Performed by: FAMILY MEDICINE

## 2023-05-23 RX ORDER — LISINOPRIL 10 MG/1
10 TABLET ORAL DAILY
Qty: 90 TABLET | Refills: 3 | Status: SHIPPED | OUTPATIENT
Start: 2023-05-23 | End: 2024-09-04

## 2023-05-23 NOTE — PROGRESS NOTES
Ivonne is a 87 year old who is being evaluated via a billable telephone visit.      What phone number would you like to be contacted at? home  How would you like to obtain your AVS? Letitiahart  Distant Location (provider location):  On-site    Subjective   Ivonne is a 87 year old, presenting for the following health issues:  No chief complaint on file.         View : No data to display.              [facility]       Hospital Follow-up Visit:    Hospital/Nursing Home/IP Rehab Facility: [facility]  Date of Admission: 3/30/2023  Date of Discharge: 4/4/2023  Reason(s) for Admission: hypotension, bradycardia    Was your hospitalization related to COVID-19? No   Problems taking medications regularly:  None  Medication changes since discharge: None  Problems adhering to non-medication therapy:  None    Summary of hospitalization:  CareEverywhere information obtained and reviewed  Diagnostic Tests/Treatments reviewed.  Follow up needed: none  Other Healthcare Providers Involved in Patient s Care:         None  Update since discharge: improved.   Plan of care communicated with patient       Review of Systems   Constitutional, HEENT, cardiovascular, pulmonary, GI, , musculoskeletal, neuro, skin, endocrine and psych systems are negative, except as otherwise noted.      Objective           Vitals:  No vitals were obtained today due to virtual visit.    Physical Exam   healthy, alert and no distress  PSYCH: Alert and oriented times 3; coherent speech, normal   rate and volume, able to articulate logical thoughts, able   to abstract reason, no tangential thoughts, no hallucinations   or delusions  Her affect is normal  RESP: No cough, no audible wheezing, able to talk in full sentences  Remainder of exam unable to be completed due to telephone visits    A/P:  (I95.89,  E86.1) Hypotension due to hypovolemia  (primary encounter diagnosis)  Comment:   Plan: patient feeling better. Patient eating and drinking normally.    (I44.1) AV block,  Jasper 1  Comment:   Plan: Adult Cardiology Eval  Referral        Finding in hospital. Discussed discontinuing propranolol. Patient will see Cardiology.    (I10) Essential hypertension with goal blood pressure less than 140/90  Comment:   Plan: lisinopril (ZESTRIL) 10 MG tablet        Since propranolol discontinued, patient will start lisinopril 10 mg daily. Patient will follow up in 1-2 months for recheck. Patient will monitor at home.    Steven Salinas MD          Phone call duration: 9 minutes

## 2023-06-02 ENCOUNTER — MEDICAL CORRESPONDENCE (OUTPATIENT)
Dept: HEALTH INFORMATION MANAGEMENT | Facility: CLINIC | Age: 88
End: 2023-06-02
Payer: MEDICARE

## 2023-06-12 ENCOUNTER — TELEPHONE (OUTPATIENT)
Dept: FAMILY MEDICINE | Facility: CLINIC | Age: 88
End: 2023-06-12
Payer: MEDICARE

## 2023-06-12 NOTE — TELEPHONE ENCOUNTER
Home Care is calling regarding an established patient with [facility].       Requesting orders from: Steven Salinas  Provider is following patient: Yes  Is this a 60-day recertification request?  No    Orders Requested    Physical Therapy  Request for discontinuation of care   Goals have been met/progressing.            Verbal orders given.  Home Care will send orders for provider to sign.  Confirmed ok to leave a detailed message with call back.  Contact information confirmed and updated as needed.    Raegan Dean RN  [facility]

## 2023-06-14 ENCOUNTER — TELEPHONE (OUTPATIENT)
Dept: FAMILY MEDICINE | Facility: CLINIC | Age: 88
End: 2023-06-14
Payer: MEDICARE

## 2023-06-14 NOTE — TELEPHONE ENCOUNTER
Patient Quality Outreach    Patient is due for the following:   Hypertension -  BP check  Depression  -  PHQ-9 needed      Topic Date Due     Zoster (Shingles) Vaccine (1 of 2) Never done     Diptheria Tetanus Pertussis (DTAP/TDAP/TD) Vaccine (1 - Tdap) 12/05/2014     COVID-19 Vaccine (5 - Moderna series) 07/21/2022     Chronic Opioid Use -  Treatment Agreement (CSA), Urine Drug Screen, DAVID-7 and PHQ-9    Next Steps:   Schedule a Annual Wellness Visit    Type of outreach:    [employer] message.      Questions for provider review:    None           Vicky Mccray        
COVID-19

## 2023-06-20 ENCOUNTER — MEDICAL CORRESPONDENCE (OUTPATIENT)
Dept: HEALTH INFORMATION MANAGEMENT | Facility: CLINIC | Age: 88
End: 2023-06-20
Payer: MEDICARE

## 2023-06-28 DIAGNOSIS — S32.050S CLOSED COMPRESSION FRACTURE OF L5 LUMBAR VERTEBRA, SEQUELA: ICD-10-CM

## 2023-06-28 RX ORDER — OXYCODONE HYDROCHLORIDE 5 MG/1
5 TABLET ORAL 2 TIMES DAILY PRN
Qty: 60 TABLET | Refills: 0 | Status: SHIPPED | OUTPATIENT
Start: 2023-06-28 | End: 2023-08-24

## 2023-06-28 NOTE — TELEPHONE ENCOUNTER
[facility] phone call message- patient requests medication or medication refill:refill     If this is a refill request, has the caller requested the refill from the pharmacy already? No  Name of the pharmacy and phone number for the current request:  [facility]/pharmacy #[id number] - [place], [place] - [address]    Name of the medication requested:          oxyCODONE (ROXICODONE) 5 MG tablet              Other request:     OK to leave the result message on voice mail or with a family member? YES    par Call taken on 6/28/2023 at 3:39 PM by Yossi Saenz MA

## 2023-07-25 DIAGNOSIS — E78.5 HYPERLIPIDEMIA LDL GOAL <130: ICD-10-CM

## 2023-07-25 RX ORDER — ATORVASTATIN CALCIUM 10 MG/1
10 TABLET, FILM COATED ORAL DAILY
Qty: 90 TABLET | Refills: 3 | Status: SHIPPED | OUTPATIENT
Start: 2023-07-25 | End: 2024-08-13

## 2023-08-07 DIAGNOSIS — F41.8 ANXIETY ASSOCIATED WITH DEPRESSION: ICD-10-CM

## 2023-08-08 RX ORDER — SERTRALINE HYDROCHLORIDE 100 MG/1
100 TABLET, FILM COATED ORAL DAILY
Qty: 90 TABLET | Refills: 3 | Status: SHIPPED | OUTPATIENT
Start: 2023-08-08 | End: 2024-09-04

## 2023-08-19 ENCOUNTER — HEALTH MAINTENANCE LETTER (OUTPATIENT)
Age: 88
End: 2023-08-19

## 2023-08-24 ENCOUNTER — VIRTUAL VISIT (OUTPATIENT)
Dept: FAMILY MEDICINE | Facility: CLINIC | Age: 88
End: 2023-08-24
Payer: MEDICARE

## 2023-08-24 DIAGNOSIS — N32.81 OVERACTIVE BLADDER: Primary | ICD-10-CM

## 2023-08-24 DIAGNOSIS — S32.050S CLOSED COMPRESSION FRACTURE OF L5 LUMBAR VERTEBRA, SEQUELA: ICD-10-CM

## 2023-08-24 PROCEDURE — 99214 OFFICE O/P EST MOD 30 MIN: CPT | Mod: 95 | Performed by: FAMILY MEDICINE

## 2023-08-24 RX ORDER — SOLIFENACIN SUCCINATE 5 MG/1
5 TABLET, FILM COATED ORAL DAILY
Qty: 90 TABLET | Refills: 3 | Status: SHIPPED | OUTPATIENT
Start: 2023-08-24

## 2023-08-24 RX ORDER — OXYCODONE HYDROCHLORIDE 5 MG/1
5 TABLET ORAL 2 TIMES DAILY PRN
Qty: 60 TABLET | Refills: 0 | Status: SHIPPED | OUTPATIENT
Start: 2023-08-24 | End: 2023-10-09

## 2023-08-24 ASSESSMENT — PATIENT HEALTH QUESTIONNAIRE - PHQ9
SUM OF ALL RESPONSES TO PHQ QUESTIONS 1-9: 0
SUM OF ALL RESPONSES TO PHQ QUESTIONS 1-9: 0

## 2023-08-24 NOTE — PROGRESS NOTES
Ivonne is a 87 year old who is being evaluated via a billable telephone visit.      What phone number would you like to be contacted at?     [phone]     How would you like to obtain your AVS? Mail a copy    Distant Location (provider location):  On-site      Subjective   Ivonne is a 87 year old, presenting for the following health issues:  Bladder Problems      8/24/2023     7:48 AM   Additional Questions   Roomed by Lindsey       History of Present Illness       Reason for visit:  Bladder Problems  Symptom onset:  More than a month  Symptoms include:  At night up every 1-2 hours, frequent urination  Symptom intensity:  Moderate  Symptom progression:  Staying the same  What makes it worse:  None  What makes it better:  None    She eats 2-3 servings of fruits and vegetables daily.She consumes 1 sweetened beverage(s) daily.She exercises with enough effort to increase her heart rate 9 or less minutes per day.  She exercises with enough effort to increase her heart rate 3 or less days per week.   She is taking medications regularly.         Review of Systems   Constitutional, HEENT, cardiovascular, pulmonary, GI, , musculoskeletal, neuro, skin, endocrine and psych systems are negative, except as otherwise noted.      Objective           Vitals:  No vitals were obtained today due to virtual visit.    Physical Exam   healthy, alert, and no distress  PSYCH: Alert and oriented times 3; coherent speech, normal   rate and volume, able to articulate logical thoughts, able   to abstract reason, no tangential thoughts, no hallucinations   or delusions  Her affect is normal  RESP: No cough, no audible wheezing, able to talk in full sentences  Remainder of exam unable to be completed due to telephone visits    A/P:  (N32.81) Overactive bladder  (primary encounter diagnosis)  Comment:   Plan: solifenacin (VESICARE) 5 MG tablet        Trial Vesicare 5 mg daily. If no improvements, patient will let us know.    (S3.467S) Closed  compression fracture of L5 lumbar vertebra, sequela  Comment:   Plan: oxyCODONE (ROXICODONE) 5 MG tablet         reviewed. Rx refilled.    Steven Salinas MD          Phone call duration: 5 minutes

## 2023-09-19 ENCOUNTER — TELEPHONE (OUTPATIENT)
Dept: FAMILY MEDICINE | Facility: CLINIC | Age: 88
End: 2023-09-19
Payer: MEDICARE

## 2023-09-19 NOTE — TELEPHONE ENCOUNTER
Patient Quality Outreach    Patient is due for the following:   Physical Annual Wellness Visit      Topic Date Due    Zoster (Shingles) Vaccine (1 of 2) Never done    Diptheria Tetanus Pertussis (DTAP/TDAP/TD) Vaccine (1 - Tdap) 12/05/2014    COVID-19 Vaccine (5 - Moderna series) 07/21/2022    Flu Vaccine (1) 09/01/2023       Next Steps:   Schedule a nurse only visit for immunizations or office visit for  Adult Preventative    Type of outreach:    [employer] message.      Questions for provider review:    None           Catalina Dougherty MA

## 2023-10-09 DIAGNOSIS — S32.050S CLOSED COMPRESSION FRACTURE OF L5 LUMBAR VERTEBRA, SEQUELA: ICD-10-CM

## 2023-10-09 RX ORDER — OXYCODONE HYDROCHLORIDE 5 MG/1
5 TABLET ORAL 2 TIMES DAILY PRN
Qty: 60 TABLET | Refills: 0 | Status: SHIPPED | OUTPATIENT
Start: 2023-10-09 | End: 2023-11-29

## 2023-10-09 NOTE — TELEPHONE ENCOUNTER
Medication Question or Refill    Contacts         Type Contact Phone/Fax    10/09/2023 01:01 PM CDT Phone (Incoming) Ivonne Gerard (Self) [phone] (H)            What medication are you calling about (include dose and sig)?: Oxycodone 5mg    Preferred Pharmacy:[facility]/pharmacy #[id number] - [place], [place] - [address]  [address] [address]  Phone: [phone] Fax: [phone]      Controlled Substance Agreement on file:   CSA -- Patient Level:    CSA: None found at the patient level.         Do you need a refill? Yes    Patient offered an appointment? No    Do you have any questions or concerns?  No      Could we send this information to you in [facility] or would you prefer to receive a phone call?:   Patient would prefer a phone call   Okay to leave a detailed message?: Yes at Home number on file [phone] (home)

## 2023-10-31 DIAGNOSIS — I10 ESSENTIAL HYPERTENSION WITH GOAL BLOOD PRESSURE LESS THAN 140/90: ICD-10-CM

## 2023-10-31 RX ORDER — PROPRANOLOL HCL 60 MG
CAPSULE, EXTENDED RELEASE 24HR ORAL
Qty: 90 CAPSULE | Refills: 3 | OUTPATIENT
Start: 2023-10-31

## 2023-11-17 DIAGNOSIS — I10 ESSENTIAL HYPERTENSION WITH GOAL BLOOD PRESSURE LESS THAN 140/90: ICD-10-CM

## 2023-11-17 RX ORDER — PROPRANOLOL HCL 60 MG
CAPSULE, EXTENDED RELEASE 24HR ORAL
Qty: 90 CAPSULE | Refills: 3 | Status: SHIPPED | OUTPATIENT
Start: 2023-11-17

## 2023-11-29 DIAGNOSIS — S32.050S CLOSED COMPRESSION FRACTURE OF L5 LUMBAR VERTEBRA, SEQUELA: ICD-10-CM

## 2023-11-29 RX ORDER — OXYCODONE HYDROCHLORIDE 5 MG/1
5 TABLET ORAL 2 TIMES DAILY PRN
Qty: 60 TABLET | Refills: 0 | Status: SHIPPED | OUTPATIENT
Start: 2023-11-29 | End: 2024-02-06

## 2023-11-29 NOTE — TELEPHONE ENCOUNTER
Medication Question or Refill    Contacts         Type Contact Phone/Fax    11/29/2023 10:26 AM CST Phone (Incoming) Ivonne Gerard (Self) [phone] (H)            What medication are you calling about (include dose and sig)?: Oxycodone    Preferred Pharmacy:  [facility]/pharmacy #[id number] - [place], [place] - [address]  [address] [place] [address]  Phone: [phone] Fax: [phone]    Controlled Substance Agreement on file:   CSA -- Patient Level:    CSA: None found at the patient level.       Who prescribed the medication?: Nam Ho    Do you need a refill? Yes    When did you use the medication last? N/a    Patient offered an appointment? No    Do you have any questions or concerns?  No      Could we send this information to you in [facility] or would you prefer to receive a phone call?:   Patient would prefer a phone call   Okay to leave a detailed message?: Yes at Cell number on file:    Telephone Information:   Mobile [phone]

## 2023-12-06 DIAGNOSIS — M48.02 CERVICAL STENOSIS OF SPINAL CANAL: ICD-10-CM

## 2023-12-06 RX ORDER — GABAPENTIN 600 MG/1
600 TABLET ORAL 3 TIMES DAILY
Qty: 270 TABLET | Refills: 3 | Status: SHIPPED | OUTPATIENT
Start: 2023-12-06

## 2024-01-03 ENCOUNTER — NURSE TRIAGE (OUTPATIENT)
Dept: FAMILY MEDICINE | Facility: CLINIC | Age: 89
End: 2024-01-03
Payer: MEDICARE

## 2024-01-03 NOTE — TELEPHONE ENCOUNTER
Axel repots that she has had a stiff neck and it aches up into her head.  This has been going on for about 1 week - 6 days. IT started with back stiffness and it has worked its way up her neck. She reports that she is very tense. The pain is located in her head and behind her left ear.  She had an infection in her jaw, where a tooth was, on the left and bottom. She is on antibiotic for this as there was pus coming from this area.  She is on her last day of antibiotic and there is still a little pus in this area.  There is still pus coming from there. She was given the antibiotic from Dr. Queen Maxillofacial specialist.  She can touch her chin to her chest. She can move her head up and down.  She can move her head side to side but it causes pain. When she moves from side to side the pain is rated 8/10.  She also has a headache that is currently rated 9/10.   Denies: cheek swelling, jaw swelling, fever    Nursing advice: Due to her recent medical history, her age and the level of her current symptoms patient is to be assessed in the E.R. now.  Patient verbalized good understanding, agrees with plan and needs no further support.  Thank you. Christy Hewitt R.N.    Reason for Disposition   Patient sounds very sick or weak to the triager    Additional Information   Negative: Shock suspected (e.g., cold/pale/clammy skin, too weak to stand, low BP, rapid pulse)   Negative: Similar pain previously and it was from 'heart attack'   Negative: Similar pain previously from 'angina' and not relieved by nitroglycerin   Negative: Difficult to awaken or acting confused (e.g., disoriented, slurred speech)   Negative: Sounds like a life-threatening emergency to the triager   Negative: Difficulty breathing or unusual sweating (e.g., sweating without exertion)   Negative: Chest pain lasting longer than 5 minutes   Negative: Stiff neck (can't touch chin to chest) and has headache   Negative: Stiff neck (can't touch chin to chest) and  fever   Negative: Weakness of an arm or hand   Negative: Problems with bowel or bladder control    Protocols used: Neck Pain or Pveljhink-G-BT

## 2024-01-08 ENCOUNTER — TRANSFERRED RECORDS (OUTPATIENT)
Dept: HEALTH INFORMATION MANAGEMENT | Facility: CLINIC | Age: 89
End: 2024-01-08
Payer: MEDICARE

## 2024-01-08 ENCOUNTER — TELEPHONE (OUTPATIENT)
Dept: FAMILY MEDICINE | Facility: CLINIC | Age: 89
End: 2024-01-08
Payer: MEDICARE

## 2024-01-08 NOTE — TELEPHONE ENCOUNTER
"Patient is calling.    \"I have been so sick for the last 3 days.  For the last two days, I can't keep anything down.\"    Patient feels weak and is forgetful.    Writer advised patient to be taken to a hospital by a family member.    \"My  is disabled and I am in a W/C.\"    Writer advised patient to call 911.  Patient agreed with a plan.    Roxann Walter RN, BSN  [facility]    "

## 2024-01-16 ENCOUNTER — LAB REQUISITION (OUTPATIENT)
Dept: LAB | Facility: CLINIC | Age: 89
End: 2024-01-16
Payer: MEDICARE

## 2024-01-16 DIAGNOSIS — N17.8 OTHER ACUTE KIDNEY FAILURE (H): ICD-10-CM

## 2024-01-16 DIAGNOSIS — D64.89 OTHER SPECIFIED ANEMIAS: ICD-10-CM

## 2024-01-18 LAB
BASOPHILS # BLD AUTO: 0.1 10E3/UL (ref 0–0.2)
BASOPHILS NFR BLD AUTO: 0 %
EOSINOPHIL # BLD AUTO: 0.4 10E3/UL (ref 0–0.7)
EOSINOPHIL NFR BLD AUTO: 3 %
ERYTHROCYTE [DISTWIDTH] IN BLOOD BY AUTOMATED COUNT: 23.3 % (ref 10–15)
HCT VFR BLD AUTO: 27.6 % (ref 35–47)
HGB BLD-MCNC: 8.1 G/DL (ref 11.7–15.7)
IMM GRANULOCYTES # BLD: 0.2 10E3/UL
IMM GRANULOCYTES NFR BLD: 1 %
LYMPHOCYTES # BLD AUTO: 1.5 10E3/UL (ref 0.8–5.3)
LYMPHOCYTES NFR BLD AUTO: 11 %
MCH RBC QN AUTO: 23.3 PG (ref 26.5–33)
MCHC RBC AUTO-ENTMCNC: 29.3 G/DL (ref 31.5–36.5)
MCV RBC AUTO: 79 FL (ref 78–100)
MONOCYTES # BLD AUTO: 0.6 10E3/UL (ref 0–1.3)
MONOCYTES NFR BLD AUTO: 5 %
NEUTROPHILS # BLD AUTO: 10.5 10E3/UL (ref 1.6–8.3)
NEUTROPHILS NFR BLD AUTO: 80 %
NRBC # BLD AUTO: 0 10E3/UL
NRBC BLD AUTO-RTO: 0 /100
PLATELET # BLD AUTO: 568 10E3/UL (ref 150–450)
RBC # BLD AUTO: 3.48 10E6/UL (ref 3.8–5.2)
WBC # BLD AUTO: 13.2 10E3/UL (ref 4–11)

## 2024-01-18 PROCEDURE — 36415 COLL VENOUS BLD VENIPUNCTURE: CPT | Performed by: FAMILY MEDICINE

## 2024-01-18 PROCEDURE — 85041 AUTOMATED RBC COUNT: CPT | Performed by: FAMILY MEDICINE

## 2024-01-18 PROCEDURE — P9604 ONE-WAY ALLOW PRORATED TRIP: HCPCS | Performed by: FAMILY MEDICINE

## 2024-01-18 PROCEDURE — 80048 BASIC METABOLIC PNL TOTAL CA: CPT | Performed by: FAMILY MEDICINE

## 2024-01-19 LAB
ANION GAP SERPL CALCULATED.3IONS-SCNC: 10 MMOL/L (ref 7–15)
BUN SERPL-MCNC: 11.2 MG/DL (ref 8–23)
CALCIUM SERPL-MCNC: 7.8 MG/DL (ref 8.8–10.2)
CHLORIDE SERPL-SCNC: 109 MMOL/L (ref 98–107)
CREAT SERPL-MCNC: 0.62 MG/DL (ref 0.51–0.95)
DEPRECATED HCO3 PLAS-SCNC: 23 MMOL/L (ref 22–29)
EGFRCR SERPLBLD CKD-EPI 2021: 85 ML/MIN/1.73M2
GLUCOSE SERPL-MCNC: 69 MG/DL (ref 70–99)
POTASSIUM SERPL-SCNC: 4.6 MMOL/L (ref 3.4–5.3)
SODIUM SERPL-SCNC: 142 MMOL/L (ref 135–145)

## 2024-02-02 ENCOUNTER — TELEPHONE (OUTPATIENT)
Dept: FAMILY MEDICINE | Facility: CLINIC | Age: 89
End: 2024-02-02
Payer: MEDICARE

## 2024-02-02 NOTE — TELEPHONE ENCOUNTER
Home Care is calling regarding an established patient with [facility].       Requesting orders from: Steven Salinas  Provider is following patient: No       Orders Requested  Delay home care start of care until Saturday 2/3/24, due to staffing.    Yaritza Perez, ALANN, RN

## 2024-02-05 ENCOUNTER — TELEPHONE (OUTPATIENT)
Dept: FAMILY MEDICINE | Facility: CLINIC | Age: 89
End: 2024-02-05
Payer: MEDICARE

## 2024-02-05 NOTE — TELEPHONE ENCOUNTER
Home Care is calling regarding an established patient with [facility].        2/2/2024    12:45 PM   Home Care Information   Date of Home Care episode start 2/3/2024   Home Care agency [facility]     Requesting orders from: Steven Salinas  Provider is following patient: Yes  Is this a 60-day recertification request?  No    Orders Requested    Physical Therapy  Request for initial certification (first set of orders)   Frequency:  2x/wk for 3 wks  then 1x/wk for 3 wks    Occupational Therapy  Request for initial evaluation and treatment (one time)     HHA (Home Health Aide)  Request for initial certification (first set of orders)   Frequency:  1x/wk for 6 wks for bathing      Verbal orders given for Occupational Therapy. Information was gathered for Physical therapy and Home Health Aid orders.  Provider review needed.  RN will contact Home Care with information after provider review.  Confirmed ok to leave a detailed message with call back.  Contact information confirmed and updated as needed.    Rosales Gonzáles RN

## 2024-02-06 ENCOUNTER — MEDICAL CORRESPONDENCE (OUTPATIENT)
Dept: HEALTH INFORMATION MANAGEMENT | Facility: CLINIC | Age: 89
End: 2024-02-06
Payer: MEDICARE

## 2024-02-06 DIAGNOSIS — S32.050S CLOSED COMPRESSION FRACTURE OF L5 LUMBAR VERTEBRA, SEQUELA: ICD-10-CM

## 2024-02-06 RX ORDER — OXYCODONE HYDROCHLORIDE 5 MG/1
5 TABLET ORAL 2 TIMES DAILY PRN
Qty: 60 TABLET | Refills: 0 | Status: SHIPPED | OUTPATIENT
Start: 2024-02-06 | End: 2024-05-28

## 2024-02-06 NOTE — TELEPHONE ENCOUNTER
Medication Question or Refill    Contacts         Type Contact Phone/Fax    02/06/2024 12:02 PM CST Phone (Incoming) Ivonne Gerard (Self) [phone] (W)            What medication are you calling about (include dose and sig)?: Oxycodone    Preferred Pharmacy:  [facility]/pharmacy #[id number] - [place], [place] - [address]  [address] [place] [address]  Phone: [phone] Fax: [phone]      Controlled Substance Agreement on file:   CSA -- Patient Level:    CSA: None found at the patient level.       Who prescribed the medication?: Nam Ho    Do you need a refill? Yes    When did you use the medication last? N/a    Patient offered an appointment? No    Do you have any questions or concerns?  No      Could we send this information to you in [facility] or would you prefer to receive a phone call?:   Patient would prefer a phone call   Okay to leave a detailed message?: Yes at Cell number on file:    Telephone Information:   Mobile [phone]

## 2024-02-09 ENCOUNTER — MEDICAL CORRESPONDENCE (OUTPATIENT)
Dept: HEALTH INFORMATION MANAGEMENT | Facility: CLINIC | Age: 89
End: 2024-02-09
Payer: MEDICARE

## 2024-02-14 ENCOUNTER — TELEPHONE (OUTPATIENT)
Dept: FAMILY MEDICINE | Facility: CLINIC | Age: 89
End: 2024-02-14
Payer: MEDICARE

## 2024-02-14 ENCOUNTER — MEDICAL CORRESPONDENCE (OUTPATIENT)
Dept: HEALTH INFORMATION MANAGEMENT | Facility: CLINIC | Age: 89
End: 2024-02-14
Payer: MEDICARE

## 2024-02-14 NOTE — TELEPHONE ENCOUNTER
This writer attempted to contact KWAME Adrian with [facility] on 02/14/24      Reason for call relay verbal order on behalf of Dr. Salinas and left detailed message.    No further action needed at this time.      Yaritza Neal RN

## 2024-02-14 NOTE — TELEPHONE ENCOUNTER
Order received and faxed to [facility] at [phone].  A copy placed in TC bin and Abstract.

## 2024-02-14 NOTE — TELEPHONE ENCOUNTER
Home Care is calling regarding an established patient with [facility].        2/2/2024    12:45 PM   Home Care Information   Date of Home Care episode start 2/3/2024   Home Care agency [facility]     Requesting orders from: Steven Salinas  Provider is following patient: Yes  Is this a 60-day recertification request?  No    Orders Requested    Occupational Therapy  Request for initial certification (first set of orders)   Frequency:  1x/wk for 4 wks      Information was gathered and will be sent to provider for review.  RN will contact Home Care with information after provider review.  Confirmed ok to leave a detailed message with call back.  Contact information confirmed and updated as needed.    Raegan Pires RN

## 2024-02-19 ENCOUNTER — PATIENT OUTREACH (OUTPATIENT)
Dept: CARE COORDINATION | Facility: CLINIC | Age: 89
End: 2024-02-19
Payer: MEDICARE

## 2024-02-19 NOTE — LETTER
[facility] CARE COORDINATION  [address]  [place] [address]    February 21, 2024    Ivonne Alcantar Moustapha  [address]   [place] [address]      Dear Ivonne,    I am a clinic care coordinator who works with Steven Salinas MD, MD with the [facility]. I wanted to thank you for spending the time to talk with me.  Below is a description of clinic care coordination and how I can further assist you.       The clinic care coordination team is made up of a registered nurse, , financial resource worker and community health worker who understand the health care system. The goal of clinic care coordination is to help you manage your health and improve access to the health care system. Our team works alongside your provider to assist you in determining your health and social needs. We can help you obtain health care and community resources, providing you with necessary information and education. We can work with you through any barriers and develop a care plan that helps coordinate and strengthen the communication between you and your care team.  Our services are voluntary and are offered without charge to you personally.    Please feel free to contact me with any questions or concerns regarding care coordination and what we can offer.      We are focused on providing you with the highest-quality healthcare experience possible.    Sincerely,     Faith Sparks, [facility]  Social Work Primary Care Clinic Care Coordinator  [facility]  [phone]  [email or web address]

## 2024-02-21 ENCOUNTER — TELEPHONE (OUTPATIENT)
Dept: FAMILY MEDICINE | Facility: CLINIC | Age: 89
End: 2024-02-21
Payer: MEDICARE

## 2024-02-21 NOTE — PROGRESS NOTES
[facility] Population - Proactive Outreach    Background: Patient outreach conducted proactively to support health maintenance initiatives and identify any opportunities to integrated Care Coordination assistance in patient-centered goals.      Patient agreeable to scheduling Hospital/ED follow up? No, patient seeing her PCP on 3/13 for pre-op physical and will discuss at that time.     Patient accepts CC: No, not interested, no needs. Patient will be sent Care Coordination introduction letter for future reference.     Faith Sparks, ALESSANDRA  [facility]

## 2024-02-21 NOTE — TELEPHONE ENCOUNTER
Home Care is calling regarding an established patient with [facility].        2/21/2024    10:19 AM 2/2/2024    12:45 PM   Home Care Information   Date of Home Care episode start  2/3/2024    Name/Phone Number Suma CARDOZA [phone]    [facility] agency [facility]     Requesting orders from: Steven Salinas  Provider is following patient: Yes  Is this a 60-day recertification request?  No    Orders Requested    Physical Therapy  Request for continuation of care with increase in frequency  Frequency:  2x/wk for 3 wks  then 1x/wk for 2 wks         Verbal orders given.  Home Care will send orders for provider to sign.  Confirmed ok to leave a detailed message with call back.  Contact information confirmed and updated as needed.    Nelly Jackson RN

## 2024-02-21 NOTE — TELEPHONE ENCOUNTER
Received provider signed Verbal PT order to [facility], [phone], right fax confirmed at 3:21 pm today, 2/21/2024. Sent to abstracting.  Beth Hope MA  [facility]   Primary Care

## 2024-02-23 DIAGNOSIS — Z53.9 DIAGNOSIS NOT YET DEFINED: Primary | ICD-10-CM

## 2024-02-23 PROCEDURE — G0180 MD CERTIFICATION HHA PATIENT: HCPCS | Performed by: FAMILY MEDICINE

## 2024-02-27 ENCOUNTER — TELEPHONE (OUTPATIENT)
Dept: FAMILY MEDICINE | Facility: CLINIC | Age: 89
End: 2024-02-27
Payer: MEDICARE

## 2024-02-27 ENCOUNTER — MEDICAL CORRESPONDENCE (OUTPATIENT)
Dept: HEALTH INFORMATION MANAGEMENT | Facility: CLINIC | Age: 89
End: 2024-02-27
Payer: MEDICARE

## 2024-02-27 NOTE — TELEPHONE ENCOUNTER
Please advise if home care orders may be given?        Home Care is calling regarding an established patient with [facility].        Requesting orders from: Dr. Salinas  Provider is following patient: yes  Is this a 60-day recertification request?  no     Orders Requested       Medical Social Worker evaluation     Confirmed ok to leave a detailed message with call back.  Contact information confirmed and updated as needed.       Marina Kay RN, BSN, PHN  [facility]

## 2024-02-29 ENCOUNTER — TELEPHONE (OUTPATIENT)
Dept: FAMILY MEDICINE | Facility: CLINIC | Age: 89
End: 2024-02-29
Payer: MEDICARE

## 2024-02-29 NOTE — TELEPHONE ENCOUNTER
Order received and faxed to [facility] at [phone].  A copy placed in TC bin and Abstract.

## 2024-02-29 NOTE — TELEPHONE ENCOUNTER
Patient Quality Outreach    Patient is due for the following:   Depression  -  PHQ-9 needed  Physical Preventive Adult Physical      Topic Date Due    Zoster (Shingles) Vaccine (1 of 2) Never done    Diptheria Tetanus Pertussis (DTAP/TDAP/TD) Vaccine (1 - Tdap) 12/05/2014    Flu Vaccine (1) 09/01/2023       Next Steps:   Schedule a Adult Preventative    Type of outreach:    Sent [employer] message.      Questions for provider review:    None           Kathe Logan MA

## 2024-03-01 ENCOUNTER — MEDICAL CORRESPONDENCE (OUTPATIENT)
Dept: HEALTH INFORMATION MANAGEMENT | Facility: CLINIC | Age: 89
End: 2024-03-01
Payer: MEDICARE

## 2024-03-05 ENCOUNTER — MEDICAL CORRESPONDENCE (OUTPATIENT)
Dept: HEALTH INFORMATION MANAGEMENT | Facility: CLINIC | Age: 89
End: 2024-03-05
Payer: MEDICARE

## 2024-03-06 ENCOUNTER — TELEPHONE (OUTPATIENT)
Dept: FAMILY MEDICINE | Facility: CLINIC | Age: 89
End: 2024-03-06
Payer: MEDICARE

## 2024-03-06 DIAGNOSIS — Z53.9 DIAGNOSIS NOT YET DEFINED: Primary | ICD-10-CM

## 2024-03-06 PROCEDURE — G0180 MD CERTIFICATION HHA PATIENT: HCPCS | Performed by: FAMILY MEDICINE

## 2024-03-06 NOTE — TELEPHONE ENCOUNTER
Forms/Letter Request    Type of form/letter: Home Health Certification      Do we have the form/letter: Yes: Placed in MD's box    Who is the form from? Home care    Where did/will the form come from? form was faxed in    When is form/letter needed by: asap    How would you like the form/letter returned: Fax : [phone]    Patient Notified form requests are processed in 5-7 business days:Yes

## 2024-03-06 NOTE — TELEPHONE ENCOUNTER
Form faxed to [facility] [phone] on 3/6/24 at 2:04pm. Copy in abstract and original in tc bin.

## 2024-03-07 DIAGNOSIS — Z53.9 DIAGNOSIS NOT YET DEFINED: Primary | ICD-10-CM

## 2024-03-07 PROCEDURE — 99207 PR MD CERTIFICATION HHA PATIENT: CPT | Performed by: FAMILY MEDICINE

## 2024-03-15 ENCOUNTER — LAB REQUISITION (OUTPATIENT)
Dept: LAB | Facility: CLINIC | Age: 89
End: 2024-03-15
Payer: MEDICARE

## 2024-03-15 DIAGNOSIS — I10 ESSENTIAL (PRIMARY) HYPERTENSION: ICD-10-CM

## 2024-03-18 LAB
ANION GAP SERPL CALCULATED.3IONS-SCNC: 11 MMOL/L (ref 7–15)
BUN SERPL-MCNC: 15.7 MG/DL (ref 8–23)
CALCIUM SERPL-MCNC: 8.1 MG/DL (ref 8.8–10.2)
CHLORIDE SERPL-SCNC: 107 MMOL/L (ref 98–107)
CREAT SERPL-MCNC: 0.58 MG/DL (ref 0.51–0.95)
DEPRECATED HCO3 PLAS-SCNC: 23 MMOL/L (ref 22–29)
EGFRCR SERPLBLD CKD-EPI 2021: 87 ML/MIN/1.73M2
ERYTHROCYTE [DISTWIDTH] IN BLOOD BY AUTOMATED COUNT: 20.7 % (ref 10–15)
GLUCOSE SERPL-MCNC: 109 MG/DL (ref 70–99)
HCT VFR BLD AUTO: 32.4 % (ref 35–47)
HGB BLD-MCNC: 9.7 G/DL (ref 11.7–15.7)
MCH RBC QN AUTO: 27 PG (ref 26.5–33)
MCHC RBC AUTO-ENTMCNC: 29.9 G/DL (ref 31.5–36.5)
MCV RBC AUTO: 90 FL (ref 78–100)
PLATELET # BLD AUTO: 564 10E3/UL (ref 150–450)
POTASSIUM SERPL-SCNC: 4 MMOL/L (ref 3.4–5.3)
RBC # BLD AUTO: 3.59 10E6/UL (ref 3.8–5.2)
SODIUM SERPL-SCNC: 141 MMOL/L (ref 135–145)
WBC # BLD AUTO: 9.1 10E3/UL (ref 4–11)

## 2024-03-18 PROCEDURE — 36415 COLL VENOUS BLD VENIPUNCTURE: CPT | Performed by: NURSE PRACTITIONER

## 2024-03-18 PROCEDURE — 85014 HEMATOCRIT: CPT | Performed by: NURSE PRACTITIONER

## 2024-03-18 PROCEDURE — P9604 ONE-WAY ALLOW PRORATED TRIP: HCPCS | Performed by: NURSE PRACTITIONER

## 2024-03-18 PROCEDURE — 80048 BASIC METABOLIC PNL TOTAL CA: CPT | Performed by: NURSE PRACTITIONER

## 2024-03-20 ENCOUNTER — LAB REQUISITION (OUTPATIENT)
Dept: LAB | Facility: CLINIC | Age: 89
End: 2024-03-20
Payer: MEDICARE

## 2024-03-20 DIAGNOSIS — N39.0 URINARY TRACT INFECTION, SITE NOT SPECIFIED: ICD-10-CM

## 2024-03-20 PROCEDURE — 87086 URINE CULTURE/COLONY COUNT: CPT | Performed by: NURSE PRACTITIONER

## 2024-03-20 PROCEDURE — 81001 URINALYSIS AUTO W/SCOPE: CPT | Performed by: NURSE PRACTITIONER

## 2024-03-21 LAB
ALBUMIN UR-MCNC: NEGATIVE MG/DL
APPEARANCE UR: ABNORMAL
BACTERIA #/AREA URNS HPF: ABNORMAL /HPF
BILIRUB UR QL STRIP: NEGATIVE
COLOR UR AUTO: ABNORMAL
GLUCOSE UR STRIP-MCNC: NEGATIVE MG/DL
HGB UR QL STRIP: NEGATIVE
KETONES UR STRIP-MCNC: NEGATIVE MG/DL
LEUKOCYTE ESTERASE UR QL STRIP: ABNORMAL
MUCOUS THREADS #/AREA URNS LPF: PRESENT /LPF
NITRATE UR QL: NEGATIVE
PH UR STRIP: 7 [PH] (ref 5–7)
RBC URINE: 1 /HPF
SP GR UR STRIP: 1.01 (ref 1–1.03)
SQUAMOUS EPITHELIAL: 1 /HPF
TRANSITIONAL EPI: 1 /HPF
UROBILINOGEN UR STRIP-MCNC: NORMAL MG/DL
WBC CLUMPS #/AREA URNS HPF: PRESENT /HPF
WBC URINE: >182 /HPF

## 2024-03-22 LAB — BACTERIA UR CULT: NORMAL

## 2024-03-29 ENCOUNTER — LAB REQUISITION (OUTPATIENT)
Dept: LAB | Facility: CLINIC | Age: 89
End: 2024-03-29
Payer: MEDICARE

## 2024-03-29 DIAGNOSIS — M27.2 INFLAMMATORY CONDITIONS OF JAWS: ICD-10-CM

## 2024-03-30 ENCOUNTER — LAB REQUISITION (OUTPATIENT)
Dept: LAB | Facility: CLINIC | Age: 89
End: 2024-03-30
Payer: MEDICARE

## 2024-03-30 DIAGNOSIS — R19.7 DIARRHEA, UNSPECIFIED: ICD-10-CM

## 2024-03-30 LAB — C DIFF TOX B STL QL: NEGATIVE

## 2024-03-30 PROCEDURE — 87493 C DIFF AMPLIFIED PROBE: CPT | Performed by: FAMILY MEDICINE

## 2024-04-01 LAB
ALBUMIN SERPL BCG-MCNC: 2.4 G/DL (ref 3.5–5.2)
ALP SERPL-CCNC: 98 U/L (ref 40–150)
ALT SERPL W P-5'-P-CCNC: 7 U/L (ref 0–50)
ANION GAP SERPL CALCULATED.3IONS-SCNC: 8 MMOL/L (ref 7–15)
AST SERPL W P-5'-P-CCNC: 21 U/L (ref 0–45)
BASOPHILS # BLD AUTO: 0 10E3/UL (ref 0–0.2)
BASOPHILS NFR BLD AUTO: 0 %
BILIRUB SERPL-MCNC: 0.2 MG/DL
BUN SERPL-MCNC: 6.5 MG/DL (ref 8–23)
CALCIUM SERPL-MCNC: 8 MG/DL (ref 8.8–10.2)
CHLORIDE SERPL-SCNC: 108 MMOL/L (ref 98–107)
CREAT SERPL-MCNC: 0.51 MG/DL (ref 0.51–0.95)
DEPRECATED HCO3 PLAS-SCNC: 26 MMOL/L (ref 22–29)
EGFRCR SERPLBLD CKD-EPI 2021: 89 ML/MIN/1.73M2
EOSINOPHIL # BLD AUTO: 0.3 10E3/UL (ref 0–0.7)
EOSINOPHIL NFR BLD AUTO: 5 %
ERYTHROCYTE [DISTWIDTH] IN BLOOD BY AUTOMATED COUNT: 18.2 % (ref 10–15)
GLUCOSE SERPL-MCNC: 93 MG/DL (ref 70–99)
HCT VFR BLD AUTO: 33.6 % (ref 35–47)
HGB BLD-MCNC: 10.3 G/DL (ref 11.7–15.7)
IMM GRANULOCYTES # BLD: 0.1 10E3/UL
IMM GRANULOCYTES NFR BLD: 1 %
LYMPHOCYTES # BLD AUTO: 1.1 10E3/UL (ref 0.8–5.3)
LYMPHOCYTES NFR BLD AUTO: 17 %
MCH RBC QN AUTO: 27.8 PG (ref 26.5–33)
MCHC RBC AUTO-ENTMCNC: 30.7 G/DL (ref 31.5–36.5)
MCV RBC AUTO: 91 FL (ref 78–100)
MONOCYTES # BLD AUTO: 0.3 10E3/UL (ref 0–1.3)
MONOCYTES NFR BLD AUTO: 4 %
NEUTROPHILS # BLD AUTO: 4.9 10E3/UL (ref 1.6–8.3)
NEUTROPHILS NFR BLD AUTO: 73 %
NRBC # BLD AUTO: 0 10E3/UL
NRBC BLD AUTO-RTO: 0 /100
PLATELET # BLD AUTO: 397 10E3/UL (ref 150–450)
POTASSIUM SERPL-SCNC: 2.9 MMOL/L (ref 3.4–5.3)
PROT SERPL-MCNC: 5 G/DL (ref 6.4–8.3)
RBC # BLD AUTO: 3.71 10E6/UL (ref 3.8–5.2)
SODIUM SERPL-SCNC: 142 MMOL/L (ref 135–145)
WBC # BLD AUTO: 6.7 10E3/UL (ref 4–11)

## 2024-04-01 PROCEDURE — 80053 COMPREHEN METABOLIC PANEL: CPT | Performed by: FAMILY MEDICINE

## 2024-04-01 PROCEDURE — P9604 ONE-WAY ALLOW PRORATED TRIP: HCPCS | Performed by: FAMILY MEDICINE

## 2024-04-01 PROCEDURE — 36415 COLL VENOUS BLD VENIPUNCTURE: CPT | Performed by: FAMILY MEDICINE

## 2024-04-01 PROCEDURE — 85025 COMPLETE CBC W/AUTO DIFF WBC: CPT | Performed by: FAMILY MEDICINE

## 2024-04-02 ENCOUNTER — LAB REQUISITION (OUTPATIENT)
Dept: LAB | Facility: CLINIC | Age: 89
End: 2024-04-02
Payer: MEDICARE

## 2024-04-02 DIAGNOSIS — D72.828 OTHER ELEVATED WHITE BLOOD CELL COUNT: ICD-10-CM

## 2024-04-03 ENCOUNTER — LAB REQUISITION (OUTPATIENT)
Dept: LAB | Facility: CLINIC | Age: 89
End: 2024-04-03
Payer: MEDICARE

## 2024-04-03 DIAGNOSIS — D72.828 OTHER ELEVATED WHITE BLOOD CELL COUNT: ICD-10-CM

## 2024-04-03 DIAGNOSIS — E87.6 HYPOKALEMIA: ICD-10-CM

## 2024-04-03 LAB
ACANTHOCYTES BLD QL SMEAR: NORMAL
ANION GAP SERPL CALCULATED.3IONS-SCNC: 9 MMOL/L (ref 7–15)
AUER BODIES BLD QL SMEAR: NORMAL
BASO STIPL BLD QL SMEAR: NORMAL
BASOPHILS # BLD AUTO: 0 10E3/UL (ref 0–0.2)
BASOPHILS NFR BLD AUTO: 0 %
BITE CELLS BLD QL SMEAR: NORMAL
BLISTER CELLS BLD QL SMEAR: NORMAL
BUN SERPL-MCNC: 7.5 MG/DL (ref 8–23)
BURR CELLS BLD QL SMEAR: NORMAL
CALCIUM SERPL-MCNC: 8.3 MG/DL (ref 8.8–10.2)
CHLORIDE SERPL-SCNC: 108 MMOL/L (ref 98–107)
CREAT SERPL-MCNC: 0.61 MG/DL (ref 0.51–0.95)
DACRYOCYTES BLD QL SMEAR: NORMAL
DEPRECATED HCO3 PLAS-SCNC: 27 MMOL/L (ref 22–29)
EGFRCR SERPLBLD CKD-EPI 2021: 86 ML/MIN/1.73M2
ELLIPTOCYTES BLD QL SMEAR: NORMAL
EOSINOPHIL # BLD AUTO: 0.3 10E3/UL (ref 0–0.7)
EOSINOPHIL NFR BLD AUTO: 4 %
ERYTHROCYTE [DISTWIDTH] IN BLOOD BY AUTOMATED COUNT: 18.1 % (ref 10–15)
FRAGMENTS BLD QL SMEAR: NORMAL
GLUCOSE SERPL-MCNC: 92 MG/DL (ref 70–99)
HCT VFR BLD AUTO: 35.9 % (ref 35–47)
HGB BLD-MCNC: 11.2 G/DL (ref 11.7–15.7)
HGB C CRYSTALS: NORMAL
HOWELL-JOLLY BOD BLD QL SMEAR: NORMAL
IMM GRANULOCYTES # BLD: 0.1 10E3/UL
IMM GRANULOCYTES NFR BLD: 1 %
LYMPHOCYTES # BLD AUTO: 1.4 10E3/UL (ref 0.8–5.3)
LYMPHOCYTES NFR BLD AUTO: 17 %
MCH RBC QN AUTO: 28.3 PG (ref 26.5–33)
MCHC RBC AUTO-ENTMCNC: 31.2 G/DL (ref 31.5–36.5)
MCV RBC AUTO: 91 FL (ref 78–100)
MONOCYTES # BLD AUTO: 0.3 10E3/UL (ref 0–1.3)
MONOCYTES NFR BLD AUTO: 4 %
NEUTROPHILS # BLD AUTO: 6.1 10E3/UL (ref 1.6–8.3)
NEUTROPHILS NFR BLD AUTO: 74 %
NEUTS HYPERSEG BLD QL SMEAR: NORMAL
NRBC # BLD AUTO: 0 10E3/UL
NRBC BLD AUTO-RTO: 0 /100
PLAT MORPH BLD: NORMAL
PLATELET # BLD AUTO: 576 10E3/UL (ref 150–450)
POLYCHROMASIA BLD QL SMEAR: NORMAL
POTASSIUM SERPL-SCNC: 2.7 MMOL/L (ref 3.4–5.3)
RBC # BLD AUTO: 3.96 10E6/UL (ref 3.8–5.2)
RBC AGGLUT BLD QL: NORMAL
RBC MORPH BLD: NORMAL
ROULEAUX BLD QL SMEAR: NORMAL
SICKLE CELLS BLD QL SMEAR: NORMAL
SMUDGE CELLS BLD QL SMEAR: NORMAL
SODIUM SERPL-SCNC: 144 MMOL/L (ref 135–145)
SPHEROCYTES BLD QL SMEAR: NORMAL
STOMATOCYTES BLD QL SMEAR: NORMAL
TARGETS BLD QL SMEAR: NORMAL
TOXIC GRANULES BLD QL SMEAR: NORMAL
VARIANT LYMPHS BLD QL SMEAR: NORMAL
WBC # BLD AUTO: 8.3 10E3/UL (ref 4–11)

## 2024-04-03 PROCEDURE — 85025 COMPLETE CBC W/AUTO DIFF WBC: CPT | Performed by: FAMILY MEDICINE

## 2024-04-03 PROCEDURE — P9603 ONE-WAY ALLOW PRORATED MILES: HCPCS | Performed by: FAMILY MEDICINE

## 2024-04-03 PROCEDURE — 36415 COLL VENOUS BLD VENIPUNCTURE: CPT | Performed by: FAMILY MEDICINE

## 2024-04-03 PROCEDURE — 80048 BASIC METABOLIC PNL TOTAL CA: CPT | Performed by: FAMILY MEDICINE

## 2024-04-04 LAB — POTASSIUM SERPL-SCNC: 2.7 MMOL/L (ref 3.4–5.3)

## 2024-04-04 PROCEDURE — 36415 COLL VENOUS BLD VENIPUNCTURE: CPT | Performed by: FAMILY MEDICINE

## 2024-04-04 PROCEDURE — 84132 ASSAY OF SERUM POTASSIUM: CPT | Performed by: FAMILY MEDICINE

## 2024-04-04 PROCEDURE — P9603 ONE-WAY ALLOW PRORATED MILES: HCPCS | Performed by: FAMILY MEDICINE

## 2024-04-08 LAB — POTASSIUM SERPL-SCNC: 5.4 MMOL/L (ref 3.4–5.3)

## 2024-04-08 PROCEDURE — 36415 COLL VENOUS BLD VENIPUNCTURE: CPT | Performed by: FAMILY MEDICINE

## 2024-04-08 PROCEDURE — 84132 ASSAY OF SERUM POTASSIUM: CPT | Performed by: FAMILY MEDICINE

## 2024-04-09 ENCOUNTER — LAB REQUISITION (OUTPATIENT)
Dept: LAB | Facility: CLINIC | Age: 89
End: 2024-04-09
Payer: MEDICARE

## 2024-04-09 DIAGNOSIS — E87.6 HYPOKALEMIA: ICD-10-CM

## 2024-04-11 LAB
ANION GAP SERPL CALCULATED.3IONS-SCNC: 8 MMOL/L (ref 7–15)
BUN SERPL-MCNC: 11.6 MG/DL (ref 8–23)
CALCIUM SERPL-MCNC: 7.9 MG/DL (ref 8.8–10.2)
CHLORIDE SERPL-SCNC: 110 MMOL/L (ref 98–107)
CREAT SERPL-MCNC: 0.55 MG/DL (ref 0.51–0.95)
DEPRECATED HCO3 PLAS-SCNC: 24 MMOL/L (ref 22–29)
EGFRCR SERPLBLD CKD-EPI 2021: 88 ML/MIN/1.73M2
GLUCOSE SERPL-MCNC: 72 MG/DL (ref 70–99)
POTASSIUM SERPL-SCNC: 4 MMOL/L (ref 3.4–5.3)
SODIUM SERPL-SCNC: 142 MMOL/L (ref 135–145)

## 2024-04-11 PROCEDURE — 80048 BASIC METABOLIC PNL TOTAL CA: CPT | Performed by: FAMILY MEDICINE

## 2024-04-11 PROCEDURE — P9603 ONE-WAY ALLOW PRORATED MILES: HCPCS | Performed by: FAMILY MEDICINE

## 2024-04-11 PROCEDURE — 36415 COLL VENOUS BLD VENIPUNCTURE: CPT | Performed by: FAMILY MEDICINE

## 2024-04-18 ENCOUNTER — TELEPHONE (OUTPATIENT)
Dept: FAMILY MEDICINE | Facility: CLINIC | Age: 89
End: 2024-04-18
Payer: MEDICARE

## 2024-04-18 ENCOUNTER — MEDICAL CORRESPONDENCE (OUTPATIENT)
Dept: HEALTH INFORMATION MANAGEMENT | Facility: CLINIC | Age: 89
End: 2024-04-18
Payer: MEDICARE

## 2024-04-18 NOTE — TELEPHONE ENCOUNTER
Order received and faxed to [facility] at [phone].  A copy placed in TC bin and Abstract.

## 2024-04-19 ENCOUNTER — TELEPHONE (OUTPATIENT)
Dept: FAMILY MEDICINE | Facility: CLINIC | Age: 89
End: 2024-04-19
Payer: MEDICARE

## 2024-04-19 NOTE — TELEPHONE ENCOUNTER
Detailed message left with below verbal order approval.       Nelly Jackson RN    [facility]

## 2024-04-19 NOTE — TELEPHONE ENCOUNTER
Home Care is calling regarding an established patient with [facility].        2/21/2024    10:19 AM 2/2/2024    12:45 PM   Home Care Information   Date of Home Care episode start  2/3/2024    Name/Phone Number Suma CARDOZA [phone]    [facility]     Requesting orders from: Steven Salinas  Provider is following patient: Yes  Is this a 60-day recertification request?  No    Orders Requested    Physical Therapy  Request for initial certification (first set of orders)   Frequency:  2x/wk for 3 wks  then 1x/wk for 3 wks      Information was gathered and will be sent to provider for review.  RN will contact Home Care with information after provider review.  Confirmed ok to leave a detailed message with call back.  Contact information confirmed and updated as needed.    Deya Gabirel RN

## 2024-04-22 ENCOUNTER — TELEPHONE (OUTPATIENT)
Dept: FAMILY MEDICINE | Facility: CLINIC | Age: 89
End: 2024-04-22
Payer: MEDICARE

## 2024-04-22 NOTE — TELEPHONE ENCOUNTER
Home Care is calling regarding an established patient with [facility].        2/21/2024    10:19 AM 2/2/2024    12:45 PM   Home Care Information   Date of Home Care episode start  2/3/2024    Name/Phone Number Suma CARDOZA [phone]    [facility]     Requesting orders from: Steven Salinas  Provider is following patient: Yes  Is this a 60-day recertification request?  No    Orders Requested    Occupational Therapy  Request for initial certification (first set of orders)  Frequency: 1x/week for 5 weeks      Information was gathered and will be sent to provider for review.  RN will contact Home Care with information after provider review.    Confirmed ok to leave a detailed message with call back.  Contact information confirmed and updated as needed.      CHARLES Gomes, RN  [facility]   rectal pain/ABDOMINAL PAIN

## 2024-04-23 ENCOUNTER — MEDICAL CORRESPONDENCE (OUTPATIENT)
Dept: HEALTH INFORMATION MANAGEMENT | Facility: CLINIC | Age: 89
End: 2024-04-23
Payer: MEDICARE

## 2024-04-23 ENCOUNTER — TELEPHONE (OUTPATIENT)
Dept: FAMILY MEDICINE | Facility: CLINIC | Age: 89
End: 2024-04-23
Payer: MEDICARE

## 2024-04-23 NOTE — TELEPHONE ENCOUNTER
Order received and faxed to [facility] at [phone].  A copy placed in TC bin and Abstract.

## 2024-05-03 ENCOUNTER — TELEPHONE (OUTPATIENT)
Dept: FAMILY MEDICINE | Facility: CLINIC | Age: 89
End: 2024-05-03
Payer: MEDICARE

## 2024-05-03 NOTE — TELEPHONE ENCOUNTER
Forms/Letter Request    Type of form/letter: Home Health Certification      Do we have the form/letter: Yes: Placed in MD's box    Who is the form from? Home care    Where did/will the form come from? form was faxed in    When is form/letter needed by: asap    How would you like the form/letter returned: Fax : [phone]    Patient Notified form requests are processed in 5-7 business days:Yes

## 2024-05-03 NOTE — TELEPHONE ENCOUNTER
Order received and faxed to [facility] at [phone].  A copy placed in TC bin and Abstract.

## 2024-05-13 DIAGNOSIS — Z53.9 DIAGNOSIS NOT YET DEFINED: Primary | ICD-10-CM

## 2024-05-13 PROCEDURE — G0180 MD CERTIFICATION HHA PATIENT: HCPCS | Performed by: FAMILY MEDICINE

## 2024-05-28 DIAGNOSIS — K21.9 GASTROESOPHAGEAL REFLUX DISEASE WITHOUT ESOPHAGITIS: ICD-10-CM

## 2024-05-28 DIAGNOSIS — S32.050S CLOSED COMPRESSION FRACTURE OF L5 LUMBAR VERTEBRA, SEQUELA: ICD-10-CM

## 2024-05-28 RX ORDER — OXYCODONE HYDROCHLORIDE 5 MG/1
5 TABLET ORAL 2 TIMES DAILY PRN
Qty: 60 TABLET | Refills: 0 | Status: SHIPPED | OUTPATIENT
Start: 2024-05-28

## 2024-05-28 NOTE — TELEPHONE ENCOUNTER
Medication Question or Refill    Contacts         Type Contact Phone/Fax    05/28/2024 01:56 PM CDT Phone (Incoming) Ivonne Gerard (Self) [phone] (H)            What medication are you calling about (include dose and sig)?: oxyCODONE (ROXICODONE) 5 MG tablet     Preferred Pharmacy:  [facility]/pharmacy #[id number] [facility] [address]  [address] [place] [address]  Phone: [phone] Fax: [phone]    Controlled Substance Agreement on file:   CSA -- Patient Level:    CSA: None found at the patient level.       Who prescribed the medication?: Dr Steven Salinas    Do you need a refill? Yes    Patient offered an appointment? No    Do you have any questions or concerns?  No      Could we send this information to you in [facility] or would you prefer to receive a phone call?:   Patient would prefer a phone call   Okay to leave a detailed message?: Yes at Home number on file [phone] (home)    Beth Hope MA  [facility]   Primary Care

## 2024-05-29 ENCOUNTER — TELEPHONE (OUTPATIENT)
Dept: FAMILY MEDICINE | Facility: CLINIC | Age: 89
End: 2024-05-29
Payer: MEDICARE

## 2024-05-29 ENCOUNTER — MEDICAL CORRESPONDENCE (OUTPATIENT)
Dept: HEALTH INFORMATION MANAGEMENT | Facility: CLINIC | Age: 89
End: 2024-05-29
Payer: MEDICARE

## 2024-05-29 NOTE — TELEPHONE ENCOUNTER
Home Care is calling regarding an established patient with  [facility].        2/21/2024    10:19 AM 2/2/2024    12:45 PM   Home Care Information   Date of Home Care episode start  2/3/2024    Name/Phone Number Suma CARDOZA [phone]    [facility]     Requesting orders from: Steven Salinas  Provider is following patient: Yes  Is this a 60-day recertification request?  No    Orders Requested    Physical Therapy  Request for continuation of care with no increase or decrease in frequency  Frequency:  1x/wk for 2 wks          Verbal orders given.  Home Care will send orders for provider to sign.  Confirmed ok to leave a detailed message with call back.  Contact information confirmed and updated as needed.    Raegan Pires RN

## 2024-06-04 ENCOUNTER — MEDICAL CORRESPONDENCE (OUTPATIENT)
Dept: HEALTH INFORMATION MANAGEMENT | Facility: CLINIC | Age: 89
End: 2024-06-04
Payer: MEDICARE

## 2024-06-11 ENCOUNTER — LAB REQUISITION (OUTPATIENT)
Dept: LAB | Facility: CLINIC | Age: 89
End: 2024-06-11
Payer: MEDICARE

## 2024-06-11 DIAGNOSIS — E83.42 HYPOMAGNESEMIA: ICD-10-CM

## 2024-06-11 DIAGNOSIS — K56.7 ILEUS, UNSPECIFIED (H): ICD-10-CM

## 2024-06-12 LAB
ALBUMIN SERPL BCG-MCNC: 2 G/DL (ref 3.5–5.2)
ALP SERPL-CCNC: 95 U/L (ref 40–150)
ALT SERPL W P-5'-P-CCNC: 11 U/L (ref 0–50)
ANION GAP SERPL CALCULATED.3IONS-SCNC: 8 MMOL/L (ref 7–15)
AST SERPL W P-5'-P-CCNC: 18 U/L (ref 0–45)
BASOPHILS # BLD AUTO: 0 10E3/UL (ref 0–0.2)
BASOPHILS NFR BLD AUTO: 0 %
BILIRUB SERPL-MCNC: <0.2 MG/DL
BUN SERPL-MCNC: 14.8 MG/DL (ref 8–23)
CALCIUM SERPL-MCNC: 7.9 MG/DL (ref 8.8–10.2)
CHLORIDE SERPL-SCNC: 107 MMOL/L (ref 98–107)
CREAT SERPL-MCNC: 0.5 MG/DL (ref 0.51–0.95)
DEPRECATED HCO3 PLAS-SCNC: 26 MMOL/L (ref 22–29)
EGFRCR SERPLBLD CKD-EPI 2021: 90 ML/MIN/1.73M2
EOSINOPHIL # BLD AUTO: 0.3 10E3/UL (ref 0–0.7)
EOSINOPHIL NFR BLD AUTO: 3 %
ERYTHROCYTE [DISTWIDTH] IN BLOOD BY AUTOMATED COUNT: 17.2 % (ref 10–15)
GLUCOSE SERPL-MCNC: 78 MG/DL (ref 70–99)
HCT VFR BLD AUTO: 32.1 % (ref 35–47)
HGB BLD-MCNC: 10 G/DL (ref 11.7–15.7)
IMM GRANULOCYTES # BLD: 0.1 10E3/UL
IMM GRANULOCYTES NFR BLD: 1 %
LYMPHOCYTES # BLD AUTO: 1.3 10E3/UL (ref 0.8–5.3)
LYMPHOCYTES NFR BLD AUTO: 13 %
MAGNESIUM SERPL-MCNC: 1.6 MG/DL (ref 1.7–2.3)
MCH RBC QN AUTO: 28.7 PG (ref 26.5–33)
MCHC RBC AUTO-ENTMCNC: 31.2 G/DL (ref 31.5–36.5)
MCV RBC AUTO: 92 FL (ref 78–100)
MONOCYTES # BLD AUTO: 0.5 10E3/UL (ref 0–1.3)
MONOCYTES NFR BLD AUTO: 5 %
NEUTROPHILS # BLD AUTO: 8 10E3/UL (ref 1.6–8.3)
NEUTROPHILS NFR BLD AUTO: 78 %
NRBC # BLD AUTO: 0 10E3/UL
NRBC BLD AUTO-RTO: 0 /100
PLATELET # BLD AUTO: 513 10E3/UL (ref 150–450)
POTASSIUM SERPL-SCNC: 3.8 MMOL/L (ref 3.4–5.3)
PROT SERPL-MCNC: 4.2 G/DL (ref 6.4–8.3)
RBC # BLD AUTO: 3.49 10E6/UL (ref 3.8–5.2)
SODIUM SERPL-SCNC: 141 MMOL/L (ref 135–145)
WBC # BLD AUTO: 10.2 10E3/UL (ref 4–11)

## 2024-06-12 PROCEDURE — 36415 COLL VENOUS BLD VENIPUNCTURE: CPT | Performed by: FAMILY MEDICINE

## 2024-06-12 PROCEDURE — 85025 COMPLETE CBC W/AUTO DIFF WBC: CPT | Performed by: FAMILY MEDICINE

## 2024-06-12 PROCEDURE — P9603 ONE-WAY ALLOW PRORATED MILES: HCPCS | Performed by: FAMILY MEDICINE

## 2024-06-12 PROCEDURE — 83735 ASSAY OF MAGNESIUM: CPT | Performed by: FAMILY MEDICINE

## 2024-06-12 PROCEDURE — 80053 COMPREHEN METABOLIC PANEL: CPT | Performed by: FAMILY MEDICINE

## 2024-06-17 ENCOUNTER — TRANSFERRED RECORDS (OUTPATIENT)
Dept: HEALTH INFORMATION MANAGEMENT | Facility: CLINIC | Age: 89
End: 2024-06-17
Payer: MEDICARE

## 2024-06-25 ENCOUNTER — LAB REQUISITION (OUTPATIENT)
Dept: LAB | Facility: CLINIC | Age: 89
End: 2024-06-25
Payer: MEDICARE

## 2024-06-25 DIAGNOSIS — I10 ESSENTIAL (PRIMARY) HYPERTENSION: ICD-10-CM

## 2024-06-26 ENCOUNTER — TRANSFERRED RECORDS (OUTPATIENT)
Dept: HEALTH INFORMATION MANAGEMENT | Facility: CLINIC | Age: 89
End: 2024-06-26
Payer: MEDICARE

## 2024-07-07 ENCOUNTER — LAB REQUISITION (OUTPATIENT)
Dept: LAB | Facility: CLINIC | Age: 89
End: 2024-07-07
Payer: MEDICARE

## 2024-07-07 DIAGNOSIS — R60.0 LOCALIZED EDEMA: ICD-10-CM

## 2024-07-07 DIAGNOSIS — J96.01 ACUTE RESPIRATORY FAILURE WITH HYPOXIA (H): ICD-10-CM

## 2024-07-08 LAB
ALBUMIN SERPL BCG-MCNC: 2 G/DL (ref 3.5–5.2)
ALP SERPL-CCNC: 115 U/L (ref 40–150)
ALT SERPL W P-5'-P-CCNC: 7 U/L (ref 0–50)
ANION GAP SERPL CALCULATED.3IONS-SCNC: 6 MMOL/L (ref 7–15)
AST SERPL W P-5'-P-CCNC: 20 U/L (ref 0–45)
BASOPHILS # BLD AUTO: 0 10E3/UL (ref 0–0.2)
BASOPHILS NFR BLD AUTO: 1 %
BILIRUB SERPL-MCNC: <0.2 MG/DL
BUN SERPL-MCNC: 12.8 MG/DL (ref 8–23)
CALCIUM SERPL-MCNC: 8 MG/DL (ref 8.8–10.2)
CHLORIDE SERPL-SCNC: 108 MMOL/L (ref 98–107)
CREAT SERPL-MCNC: 0.47 MG/DL (ref 0.51–0.95)
DEPRECATED HCO3 PLAS-SCNC: 29 MMOL/L (ref 22–29)
EGFRCR SERPLBLD CKD-EPI 2021: >90 ML/MIN/1.73M2
EOSINOPHIL # BLD AUTO: 0.2 10E3/UL (ref 0–0.7)
EOSINOPHIL NFR BLD AUTO: 3 %
ERYTHROCYTE [DISTWIDTH] IN BLOOD BY AUTOMATED COUNT: 20.2 % (ref 10–15)
GLUCOSE SERPL-MCNC: 70 MG/DL (ref 70–99)
HCT VFR BLD AUTO: 30.6 % (ref 35–47)
HGB BLD-MCNC: 9.1 G/DL (ref 11.7–15.7)
IMM GRANULOCYTES # BLD: 0 10E3/UL
IMM GRANULOCYTES NFR BLD: 1 %
LYMPHOCYTES # BLD AUTO: 1.3 10E3/UL (ref 0.8–5.3)
LYMPHOCYTES NFR BLD AUTO: 24 %
MCH RBC QN AUTO: 28.3 PG (ref 26.5–33)
MCHC RBC AUTO-ENTMCNC: 29.7 G/DL (ref 31.5–36.5)
MCV RBC AUTO: 95 FL (ref 78–100)
MONOCYTES # BLD AUTO: 0.4 10E3/UL (ref 0–1.3)
MONOCYTES NFR BLD AUTO: 7 %
NEUTROPHILS # BLD AUTO: 3.5 10E3/UL (ref 1.6–8.3)
NEUTROPHILS NFR BLD AUTO: 64 %
NRBC # BLD AUTO: 0 10E3/UL
NRBC BLD AUTO-RTO: 0 /100
NT-PROBNP SERPL-MCNC: 1071 PG/ML (ref 0–1800)
PLATELET # BLD AUTO: 445 10E3/UL (ref 150–450)
POTASSIUM SERPL-SCNC: 4.1 MMOL/L (ref 3.4–5.3)
PROT SERPL-MCNC: 4.2 G/DL (ref 6.4–8.3)
RBC # BLD AUTO: 3.21 10E6/UL (ref 3.8–5.2)
SODIUM SERPL-SCNC: 143 MMOL/L (ref 135–145)
WBC # BLD AUTO: 5.5 10E3/UL (ref 4–11)

## 2024-07-08 PROCEDURE — 84460 ALANINE AMINO (ALT) (SGPT): CPT | Performed by: FAMILY MEDICINE

## 2024-07-08 PROCEDURE — P9603 ONE-WAY ALLOW PRORATED MILES: HCPCS | Performed by: FAMILY MEDICINE

## 2024-07-08 PROCEDURE — 83880 ASSAY OF NATRIURETIC PEPTIDE: CPT | Performed by: FAMILY MEDICINE

## 2024-07-08 PROCEDURE — 36415 COLL VENOUS BLD VENIPUNCTURE: CPT | Performed by: FAMILY MEDICINE

## 2024-07-08 PROCEDURE — 84450 TRANSFERASE (AST) (SGOT): CPT | Performed by: FAMILY MEDICINE

## 2024-07-08 PROCEDURE — 85025 COMPLETE CBC W/AUTO DIFF WBC: CPT | Performed by: FAMILY MEDICINE

## 2024-07-15 ENCOUNTER — LAB REQUISITION (OUTPATIENT)
Dept: LAB | Facility: CLINIC | Age: 89
End: 2024-07-15
Payer: MEDICARE

## 2024-07-15 DIAGNOSIS — I10 ESSENTIAL (PRIMARY) HYPERTENSION: ICD-10-CM

## 2024-07-16 LAB
ANION GAP SERPL CALCULATED.3IONS-SCNC: 7 MMOL/L (ref 7–15)
BUN SERPL-MCNC: 13.4 MG/DL (ref 8–23)
CALCIUM SERPL-MCNC: 8 MG/DL (ref 8.8–10.4)
CHLORIDE SERPL-SCNC: 106 MMOL/L (ref 98–107)
CREAT SERPL-MCNC: 0.64 MG/DL (ref 0.51–0.95)
EGFRCR SERPLBLD CKD-EPI 2021: 85 ML/MIN/1.73M2
GLUCOSE SERPL-MCNC: 90 MG/DL (ref 70–99)
HCO3 SERPL-SCNC: 28 MMOL/L (ref 22–29)
POTASSIUM SERPL-SCNC: 3.8 MMOL/L (ref 3.4–5.3)
SODIUM SERPL-SCNC: 141 MMOL/L (ref 135–145)

## 2024-07-16 PROCEDURE — P9603 ONE-WAY ALLOW PRORATED MILES: HCPCS | Performed by: FAMILY MEDICINE

## 2024-07-16 PROCEDURE — 82565 ASSAY OF CREATININE: CPT | Performed by: FAMILY MEDICINE

## 2024-07-16 PROCEDURE — 36415 COLL VENOUS BLD VENIPUNCTURE: CPT | Performed by: FAMILY MEDICINE

## 2024-07-18 ENCOUNTER — TRANSFERRED RECORDS (OUTPATIENT)
Dept: HEALTH INFORMATION MANAGEMENT | Facility: CLINIC | Age: 89
End: 2024-07-18
Payer: MEDICARE

## 2024-07-26 ENCOUNTER — VIRTUAL VISIT (OUTPATIENT)
Dept: FAMILY MEDICINE | Facility: CLINIC | Age: 89
End: 2024-07-26
Payer: MEDICARE

## 2024-07-26 DIAGNOSIS — M80.00XA AGE-RELATED OSTEOPOROSIS WITH CURRENT PATHOLOGICAL FRACTURE, INITIAL ENCOUNTER: Primary | ICD-10-CM

## 2024-07-26 PROCEDURE — 99214 OFFICE O/P EST MOD 30 MIN: CPT | Mod: 95 | Performed by: FAMILY MEDICINE

## 2024-07-26 RX ORDER — ALENDRONATE SODIUM 70 MG/1
70 TABLET ORAL WEEKLY
Qty: 12 TABLET | Refills: 4 | Status: SHIPPED | OUTPATIENT
Start: 2024-07-26

## 2024-07-26 NOTE — PROGRESS NOTES
Ivonne is a 88 year old who is being evaluated via a billable video visit.    How would you like to obtain your AVS? MyChart  If the video visit is dropped, the invitation should be resent by: Text to cell phone: [phone]  Will anyone else be joining your video visit? Yes: SON . How would they like to receive their invitation? Text to cell phone: [phone]    Please Text link to patient Sons cell  when  you are ready to connect   [phone]        Subjective   Ivonne is a 88 year old, presenting for the following health issues:  Medication Problem (Osteoporosis medications follow  )      Video Start Time:  910AM    HPI     Post op follow up. Patient was told to get on medication to help keep osteoporosis from returning. Patient use to be on Fosamax. Patient stated she had no recollections of any side effects.      Review of Systems  Constitutional, HEENT, cardiovascular, pulmonary, GI, , musculoskeletal, neuro, skin, endocrine and psych systems are negative, except as otherwise noted.      Objective    Vitals - Patient Reported  Weight (Patient Reported): 72.6 kg (160 lb)        Physical Exam   GENERAL: alert and no distress  EYES: Eyes grossly normal to inspection.  No discharge or erythema, or obvious scleral/conjunctival abnormalities.  RESP: No audible wheeze, cough, or visible cyanosis.    SKIN: Visible skin clear. No significant rash, abnormal pigmentation or lesions.  NEURO: Cranial nerves grossly intact.  Mentation and speech appropriate for age.  PSYCH: Appropriate affect, tone, and pace of words      A/P:    (M80.00XA) Age-related osteoporosis with current pathological fracture, initial encounter  (primary encounter diagnosis)  Comment:   Plan: alendronate (FOSAMAX) 70 MG tablet        Restart Fosamax 70 mg weekly. Recheck DEXA scan.          Video-Visit Details    Type of service:  Video Visit   Video End Time:9:22 AM  Originating Location (pt. Location): Home    Distant Location (provider  location):  On-site  Platform used for Video Visit: Sherif  Signed Electronically by: Steven Salinas MD, MD

## 2024-07-29 ENCOUNTER — TELEPHONE (OUTPATIENT)
Dept: FAMILY MEDICINE | Facility: CLINIC | Age: 89
End: 2024-07-29
Payer: MEDICARE

## 2024-07-29 NOTE — TELEPHONE ENCOUNTER
Patient's son Marin singleton (consent to communicate on file) and states pt had appointment on 7/26/24 and alendronate sodium was reordered. Patient resides at [facility] and facility needs documentation of order so they can administer the medication.     Patient's son already picked up medication, and it is at patient's facility, so medication does not need to be ordered at this time.    Writer called [facility] [phone] and was transferred to clinical coordinator, Eladia. Left voice mail message with request to call back to the clinic.    If [facility] calls back, please ask what is needed for patient to receive medication? Do they need a fax of the order or can take a verbal?    Kala Street RN    [facility]- Primary Care

## 2024-07-29 NOTE — TELEPHONE ENCOUNTER
Son calling back (CTC on file). He reports he talked with [facility] and he was instructed by the facility to have PCP send over an order for administration to fax # [phone], attn: Nirmala CHILDERS/2nd floor nurse.       Nelly Jackson RN    [facility]

## 2024-07-29 NOTE — TELEPHONE ENCOUNTER
Provider- please write an order (letter) giving approval for [facility] facility staff to administer alendronate 70 MG prescription as ordered.       Once letter is written, please route to  to assist with faxing over to facility. Thanks!    [facility]  Fax #: [phone] Attn: Nirmala CHILDERS/2nd floor nurse      Nelly Jackson RN    [facility]

## 2024-07-29 NOTE — LETTER
July 29, 2024      Ivonne Gerard  [address]   [place] [address]        To Whom It May Concern,     Patient has history of osteoporosis. Please administer alendronate 70 mg oral weekly for treatment.          Sincerely,        Steven Salinas MD, MD

## 2024-07-30 NOTE — TELEPHONE ENCOUNTER
Refaxed letter to [phone] on 7/30/24 at 3:29pm and also to their  desk [phone] at 3:35pm. Both faxes went through.

## 2024-07-30 NOTE — TELEPHONE ENCOUNTER
"Patient's son calling to say that  Yarelis has not received the letter and requests that we re fax. Printed Dr Salinas's 7/29/2024 letter and faxed to [facility], Atten: Nirmala RN 2nd Floor Nurse, [phone], right fax confirmed \"unable to confirm at this time as Right fax is down\". Will keep checking until end of shift and then will pass this along to coworker. I faxed on both Fax machines.  Beth Hope MA  [facility]   Primary Care        "

## 2024-08-06 ENCOUNTER — TRANSFERRED RECORDS (OUTPATIENT)
Dept: HEALTH INFORMATION MANAGEMENT | Facility: CLINIC | Age: 89
End: 2024-08-06
Payer: MEDICARE

## 2024-08-10 DIAGNOSIS — E78.5 HYPERLIPIDEMIA LDL GOAL <130: ICD-10-CM

## 2024-08-13 RX ORDER — ATORVASTATIN CALCIUM 10 MG/1
10 TABLET, FILM COATED ORAL DAILY
Qty: 90 TABLET | Refills: 3 | Status: SHIPPED | OUTPATIENT
Start: 2024-08-13

## 2024-08-19 ENCOUNTER — TELEPHONE (OUTPATIENT)
Dept: FAMILY MEDICINE | Facility: CLINIC | Age: 89
End: 2024-08-19
Payer: MEDICARE

## 2024-08-19 NOTE — TELEPHONE ENCOUNTER
Forms/Letter Request    Type of form/letter: Home Health Certification      Do we have the form/letter: Yes: Placed in MD's box    Who is the form from? Home care    Where did/will the form come from? form was faxed in    When is form/letter needed by: asap    How would you like the form/letter returned: Fax : [phone]    Patient Notified form requests are processed in 5-7 business days:Yes

## 2024-08-20 ENCOUNTER — MEDICAL CORRESPONDENCE (OUTPATIENT)
Dept: HEALTH INFORMATION MANAGEMENT | Facility: CLINIC | Age: 89
End: 2024-08-20
Payer: MEDICARE

## 2024-08-20 NOTE — TELEPHONE ENCOUNTER
Order received and faxed to [facility] at [phone].  A copy placed in TC in and abstract.

## 2024-08-23 ENCOUNTER — TELEPHONE (OUTPATIENT)
Dept: FAMILY MEDICINE | Facility: CLINIC | Age: 89
End: 2024-08-23
Payer: MEDICARE

## 2024-08-23 NOTE — TELEPHONE ENCOUNTER
Patient Quality Outreach    Patient is due for the following:   Depression  -  PHQ-9 needed  Physical Annual Wellness Visit      Topic Date Due    Zoster (Shingles) Vaccine (1 of 2) Never done    Diptheria Tetanus Pertussis (DTAP/TDAP/TD) Vaccine (1 - Tdap) 12/05/2014    COVID-19 Vaccine (7 - 2023-24 season) 02/29/2024       Next Steps:   Schedule a Annual Wellness Visit    Type of outreach:    Sent [employer] message.      Questions for provider review:    None           Kathe Logan MA

## 2024-08-28 ENCOUNTER — TELEPHONE (OUTPATIENT)
Dept: FAMILY MEDICINE | Facility: CLINIC | Age: 89
End: 2024-08-28
Payer: MEDICARE

## 2024-08-28 NOTE — TELEPHONE ENCOUNTER
Home Care is calling regarding an established patient with [facility].        8/28/2024    11:59 AM 2/21/2024    10:19 AM   Home Care Information   Current following provider Dr. Steven Salinas    Date provider agreed to follow 8/20/2024     Name/Phone Number KWAME Shipley - [phone] Suma [phone]   [facility]     Requesting orders from: Steven Salinas  Provider is following patient: Yes  Is this a 60-day recertification request?  No    Orders Requested    Occupational Therapy  Request for initial certification (first set of orders)   Frequency: 1x/week for 1 week, then 2x/week for 3 weeks, then 1x/week for 3 weeks      Information was gathered and will be sent to provider for review.  RN will contact Home Care with information after provider review.    Confirmed ok to leave a detailed message with call back.  Contact information confirmed and updated as needed.      Farnaz Grey, RN, BSN  [facility]

## 2024-08-28 NOTE — TELEPHONE ENCOUNTER
This RN attempted to reach Home Care staff on 8/28/24.  Left message to return call.      If they call back:    Please relay home care order approval.      Kristina Kjellberg, MSN, RN

## 2024-08-29 ENCOUNTER — MEDICAL CORRESPONDENCE (OUTPATIENT)
Dept: HEALTH INFORMATION MANAGEMENT | Facility: CLINIC | Age: 89
End: 2024-08-29
Payer: MEDICARE

## 2024-08-29 ENCOUNTER — TELEPHONE (OUTPATIENT)
Dept: FAMILY MEDICINE | Facility: CLINIC | Age: 89
End: 2024-08-29
Payer: MEDICARE

## 2024-08-29 NOTE — TELEPHONE ENCOUNTER
Order received and faxed to [facility] at [phone].   A copy placed in TC bin and Abstract.

## 2024-09-04 DIAGNOSIS — I10 ESSENTIAL HYPERTENSION WITH GOAL BLOOD PRESSURE LESS THAN 140/90: ICD-10-CM

## 2024-09-04 DIAGNOSIS — F41.8 ANXIETY ASSOCIATED WITH DEPRESSION: ICD-10-CM

## 2024-09-04 RX ORDER — LISINOPRIL 10 MG/1
10 TABLET ORAL DAILY
Qty: 90 TABLET | Refills: 3 | Status: SHIPPED | OUTPATIENT
Start: 2024-09-04

## 2024-09-04 RX ORDER — SERTRALINE HYDROCHLORIDE 100 MG/1
100 TABLET, FILM COATED ORAL DAILY
Qty: 90 TABLET | Refills: 3 | Status: SHIPPED | OUTPATIENT
Start: 2024-09-04

## 2024-09-09 ENCOUNTER — TRANSFERRED RECORDS (OUTPATIENT)
Dept: HEALTH INFORMATION MANAGEMENT | Facility: CLINIC | Age: 89
End: 2024-09-09
Payer: MEDICARE

## 2024-09-12 ENCOUNTER — TELEPHONE (OUTPATIENT)
Dept: FAMILY MEDICINE | Facility: CLINIC | Age: 89
End: 2024-09-12
Payer: MEDICARE

## 2024-09-12 ENCOUNTER — MEDICAL CORRESPONDENCE (OUTPATIENT)
Dept: HEALTH INFORMATION MANAGEMENT | Facility: CLINIC | Age: 89
End: 2024-09-12
Payer: MEDICARE

## 2024-09-12 NOTE — TELEPHONE ENCOUNTER
Home Care is calling regarding an established patient with [email or web address].        8/28/2024    11:59 AM 2/21/2024    10:19 AM   Home Care Information   Current following provider Dr. Steven Salinas    Date provider agreed to follow 8/20/2024     Name/Phone Number KWAME Shipley - [phone] Suma [phone]   [facility]     Requesting orders from: Steven Salinas  Provider is following patient: Yes  Is this a 60-day recertification request?  No    Orders Requested    Physical Therapy  Request for continuation of care with decrease in frequency   Goals have been met/progressing.  Frequency:  1x/wk for 4 wks        Verbal orders given.  Home Care will send orders for provider to sign.  Confirmed ok to leave a detailed message with call back.  Contact information confirmed and updated as needed.    Kristina M Kjellberg, RN

## 2024-09-17 ENCOUNTER — TELEPHONE (OUTPATIENT)
Dept: FAMILY MEDICINE | Facility: CLINIC | Age: 89
End: 2024-09-17
Payer: MEDICARE

## 2024-09-17 DIAGNOSIS — Z53.9 DIAGNOSIS NOT YET DEFINED: Primary | ICD-10-CM

## 2024-09-17 DIAGNOSIS — M48.061 SPINAL STENOSIS OF LUMBAR REGION, UNSPECIFIED WHETHER NEUROGENIC CLAUDICATION PRESENT: ICD-10-CM

## 2024-09-17 PROCEDURE — G0180 MD CERTIFICATION HHA PATIENT: HCPCS | Performed by: FAMILY MEDICINE

## 2024-09-17 RX ORDER — DICLOFENAC SODIUM 75 MG/1
TABLET, DELAYED RELEASE ORAL
Qty: 180 TABLET | Refills: 3 | Status: SHIPPED | OUTPATIENT
Start: 2024-09-17

## 2024-09-17 NOTE — TELEPHONE ENCOUNTER
Orders received and faxed to [facility] at [phone].  A copy placed in TC bin and Abstract.

## 2024-10-07 ENCOUNTER — TRANSFERRED RECORDS (OUTPATIENT)
Dept: HEALTH INFORMATION MANAGEMENT | Facility: CLINIC | Age: 89
End: 2024-10-07
Payer: MEDICARE

## 2024-10-10 ENCOUNTER — MEDICAL CORRESPONDENCE (OUTPATIENT)
Dept: HEALTH INFORMATION MANAGEMENT | Facility: CLINIC | Age: 89
End: 2024-10-10
Payer: MEDICARE

## 2024-10-11 ENCOUNTER — TELEPHONE (OUTPATIENT)
Dept: FAMILY MEDICINE | Facility: CLINIC | Age: 89
End: 2024-10-11
Payer: MEDICARE

## 2024-10-11 NOTE — TELEPHONE ENCOUNTER
Reason for Call:  Appointment Request    Patient requesting this type of appt:  cysts    Requested provider: Steven Salinas    Reason patient unable to be scheduled: Not within requested timeframe    When does patient want to be seen/preferred time: 1-2 days    Comments: Patient needs an in-office appt because she has a cysts on her back down by her tail bone, on the right side that needs to be looked at. She was advised by the pain clinic to have her pcp look at it. Please reach out to get patient in for an in office appt. Patient says theres no pain.     Could we send this information to you in [facility] or would you prefer to receive a phone call?:   Patient would prefer a phone call   Okay to leave a detailed message?: Yes at Cell number on file:    Telephone Information:   Mobile [phone]       Call taken on 10/11/2024 at 4:05 PM by Memo Prado

## 2024-10-12 ENCOUNTER — HEALTH MAINTENANCE LETTER (OUTPATIENT)
Age: 89
End: 2024-10-12

## 2024-10-14 NOTE — TELEPHONE ENCOUNTER
On called back and we scheduled an appt ok per Dr Salinas on 10/17/2024 with Anastasia Smith.  Beth Hope MA  [facility]   Primary Care

## 2024-10-17 ENCOUNTER — OFFICE VISIT (OUTPATIENT)
Dept: FAMILY MEDICINE | Facility: CLINIC | Age: 89
End: 2024-10-17
Payer: MEDICARE

## 2024-10-17 VITALS
DIASTOLIC BLOOD PRESSURE: 65 MMHG | HEART RATE: 53 BPM | TEMPERATURE: 98.1 F | BODY MASS INDEX: 32.27 KG/M2 | RESPIRATION RATE: 16 BRPM | OXYGEN SATURATION: 97 % | HEIGHT: 64 IN | SYSTOLIC BLOOD PRESSURE: 110 MMHG

## 2024-10-17 DIAGNOSIS — G89.4 CHRONIC PAIN SYNDROME: ICD-10-CM

## 2024-10-17 DIAGNOSIS — I10 ESSENTIAL HYPERTENSION WITH GOAL BLOOD PRESSURE LESS THAN 140/90: ICD-10-CM

## 2024-10-17 DIAGNOSIS — L72.3 INFLAMED SEBACEOUS CYST: Primary | ICD-10-CM

## 2024-10-17 PROCEDURE — 99213 OFFICE O/P EST LOW 20 MIN: CPT | Performed by: PHYSICIAN ASSISTANT

## 2024-10-17 ASSESSMENT — PATIENT HEALTH QUESTIONNAIRE - PHQ9
SUM OF ALL RESPONSES TO PHQ QUESTIONS 1-9: 0
10. IF YOU CHECKED OFF ANY PROBLEMS, HOW DIFFICULT HAVE THESE PROBLEMS MADE IT FOR YOU TO DO YOUR WORK, TAKE CARE OF THINGS AT HOME, OR GET ALONG WITH OTHER PEOPLE: NOT DIFFICULT AT ALL
SUM OF ALL RESPONSES TO PHQ QUESTIONS 1-9: 0

## 2024-10-17 ASSESSMENT — PAIN SCALES - GENERAL: PAINLEVEL: EXTREME PAIN (8)

## 2024-10-17 NOTE — PROGRESS NOTES
"  Assessment & Plan     Inflamed sebaceous cyst  Initially noted at pain clinic 10/7/2024.  Feels like it was painful over this past week but prior to that she only noticed a bump that was irritated.  On exam looks like it has drained and is crusted over.  No fluctuant area to drain today.  Does still have induration, mild tenderness and some surrounding erythema.  Think reasonable to treat with warm compresses and monitoring.  We also discussed potential role of antibiotics.  Could consider starting today but also reasonable to monitor.  Patient comfortable monitoring.  Will call clinic if symptoms are not improving or if she notices worsening.  Would treat with Keflex in that case.  If recurrent cystic lesion causing recurrent issues could consider removal when not inflamed.    Chronic pain syndrome  Followed by pain clinic.    Essential hypertension with goal blood pressure less than 140/90  At goal today.                Janette Coronado is a 89 year old, presenting for the following health issues:  Cystic Fibrosis        10/17/2024     1:38 PM   Additional Questions   Roomed by Marisabel     History of Present Illness       Reason for visit:  Cyst  Symptom onset:  1-2 weeks ago  Symptoms include:  Burning  Symptom intensity:  Severe  Symptom progression:  Worsening   She is taking medications regularly.         Right low back noticed marblelike lesion, unsure when she first noticed it.  Did not think much of it.  Occasionally will was pruritic but otherwise not bothersome.  She believes about a week ago it started to get painful.  Felt swollen.  Unsure if it strained anything.  Pain clinic provider noted it and recommended follow-up in primary care.            Objective    /65 (BP Location: Left arm, Patient Position: Sitting, Cuff Size: Adult Regular)   Pulse 53   Temp 98.1  F (36.7  C) (Temporal)   Resp 16   Ht 1.626 m (5' 4\")   LMP  (LMP Unknown)   SpO2 97%   BMI 32.27 kg/m    Body mass index is " 32.27 kg/m .  Physical Exam   GENERAL: alert and no distress  SKIN: Warm, dry.  Right low back/upper buttocks with approximately 2 cm diameter area of erythema, induration, tenderness.  No fluctuance.  Overlying crust approximately 4 mm in diameter, well adhered.  No active drainage or bleeding.  No other areas of redness, sores, tracks.            Signed Electronically by: Anastasia Smith PA-C

## 2024-10-22 ENCOUNTER — TELEPHONE (OUTPATIENT)
Dept: FAMILY MEDICINE | Facility: CLINIC | Age: 89
End: 2024-10-22
Payer: MEDICARE

## 2024-10-22 NOTE — TELEPHONE ENCOUNTER
"Patient is calling.    She was seen last week.  The cyst on low back was intact and now it is open.  Patient unable to view the cyst, unable to answer questions.    \"My therapist showed it to me after she took a picture of it.\"    OV scheduled with Narda Baig this Friday.  Patient will try to arrange transportation with her insurance.    Roxann Walter RN, BSN  [facility]    "

## 2024-11-07 ENCOUNTER — TRANSFERRED RECORDS (OUTPATIENT)
Dept: HEALTH INFORMATION MANAGEMENT | Facility: CLINIC | Age: 89
End: 2024-11-07
Payer: MEDICARE

## 2024-11-22 PROBLEM — I47.20 VENTRICULAR TACHYCARDIA (H): Status: RESOLVED | Noted: 2024-11-22 | Resolved: 2024-11-22

## 2024-11-22 PROBLEM — I47.20 VENTRICULAR TACHYCARDIA (H): Status: ACTIVE | Noted: 2024-11-22

## 2024-11-22 PROBLEM — J43.9 PULMONARY EMPHYSEMA, UNSPECIFIED EMPHYSEMA TYPE (H): Status: ACTIVE | Noted: 2024-11-22

## 2024-11-22 PROBLEM — S14.129D: Status: RESOLVED | Noted: 2024-11-22 | Resolved: 2024-11-22

## 2024-11-22 PROBLEM — S14.129D: Status: ACTIVE | Noted: 2024-11-22

## 2024-11-26 ENCOUNTER — TELEPHONE (OUTPATIENT)
Dept: FAMILY MEDICINE | Facility: CLINIC | Age: 89
End: 2024-11-26
Payer: MEDICARE

## 2024-11-26 NOTE — TELEPHONE ENCOUNTER
"Adam, pharmacist with [facility], calling regarding order for \"bed sores\" sent on Thursday, 11/21. RN requested additional information. Adam reports pt believes there was supposed to be a \"medicated patch\" sent to pharmacy. Per chart review, no prescription sent on Thursday. Prescription for mepilex pads sent on Friday during visit, RN advised these are not medicated. Pharmacist states pt appears to be confused by what was ordered, but will explain order to pt.     No further questions/needs.     Deya Gabriel RN  "

## 2024-12-02 ENCOUNTER — NURSE TRIAGE (OUTPATIENT)
Dept: FAMILY MEDICINE | Facility: CLINIC | Age: 89
End: 2024-12-02
Payer: MEDICARE

## 2024-12-02 NOTE — TELEPHONE ENCOUNTER
To provider - patient prefers to be seen by PCP, OK to schedule patient in 20 minute same day slot this Thursday (per patient preference)? Unable to find two 20 minutes back to back appt slots this week.     If so, please route to team coordinators to assist in scheduling  Thank you!    _______________________________________    S-(situation): ~1 month of productive cough, worst at night when trying to sleep. States it started out as cold symptoms (sore throat, cough, etc) but other symptoms improved, while the cough stayed. Interfering with sleep.    B-(background): Hx allergies, pulmonary emphysema, HTN    A-(assessment):   - Currently not having cough during day. Is having increased throat clearing though  - Cough wakes her up every night around 3-4 AM, strong productive (mostly clear, some yellow) cough. No vomiting but sometimes does gag on sputum. Mild SOB after coughing but this resolves on its own and patient is able to fall back asleep. Patient sleep flat on back - states she cannot sleep in any other position  - Denies nasal drainage, sinus pressure/congestion, SOB, fevers  - Patient has appt last month but didn't think to mention issues with cough, was more focused on cyst issue    R-(recommendations): Recommended appt in the next few days, patient agreeable to this. Patient prefers to see PCP if able, asking if she could get fit in for appt this Thursday with PCP. If unable to accommodate this, patient is OK being scheduled with another provider but would prefer message be sent to PCP      Routing to provider to review/advise    Farnaz Grey, RN, BSN  [facility]      Reason for Disposition   Cough has been present for > 3 weeks    Additional Information   Negative: Bluish (or gray) lips or face   Negative: SEVERE difficulty breathing (e.g., struggling for each breath, speaks in single words)   Negative: Rapid onset of cough and has hives   Negative: Coughing started suddenly  after medicine, an allergic food or bee sting   Negative: Difficulty breathing after exposure to flames, smoke, or fumes   Negative: Sounds like a life-threatening emergency to the triager   Negative: Previous asthma attacks and this feels like asthma attack   Negative: Dry cough (non-productive; no sputum or minimal clear sputum) and within 14 days of COVID-19 Exposure   Negative: MODERATE difficulty breathing (e.g., speaks in phrases, SOB even at rest, pulse 100-120) and still present when not coughing   Negative: Chest pain present when not coughing   Negative: Passed out (e.g., fainted, lost consciousness, blacked out and was not responding)   Negative: Patient sounds very sick or weak to the triager   Negative: MILD difficulty breathing (e.g., minimal/no SOB at rest, SOB with walking, pulse <100) and still present when not coughing   Negative: Coughed up > 1 tablespoon (15 ml) blood (Exception: Blood-tinged sputum.)   Negative: Fever > 103 F (39.4 C)   Negative: Fever > 101 F (38.3 C) and over 60 years of age   Negative: Fever > 100 F (37.8 C) and has diabetes mellitus or a weak immune system (e.g., HIV positive, cancer chemotherapy, organ transplant, splenectomy, chronic steroids)   Negative: Fever > 100 F (37.8 C) and bedridden (e.g., CVA, chronic illness, recovering from surgery)   Negative: Increasing ankle swelling   Negative: Wheezing is present   Negative: SEVERE coughing spells (e.g., whooping sound after coughing, vomiting after coughing)   Negative: Coughing up harika-colored (reddish-brown) or blood-tinged sputum   Negative: Fever present > 3 days (72 hours)   Negative: Fever returns after gone for over 24 hours and symptoms worse or not improved   Negative: Using nasal washes and pain medicine > 24 hours and sinus pain persists   Negative: Known COPD or other severe lung disease (i.e., bronchiectasis, cystic fibrosis, lung surgery) and symptoms getting worse (i.e., increased sputum purulence or  amount, increased breathing difficulty)   Negative: Continuous (nonstop) coughing interferes with work or school and no improvement using cough treatment per Care Advice   Negative: Patient wants to be seen    Protocols used: Cough-A-OH

## 2024-12-05 ENCOUNTER — OFFICE VISIT (OUTPATIENT)
Dept: FAMILY MEDICINE | Facility: CLINIC | Age: 89
End: 2024-12-05
Payer: MEDICARE

## 2024-12-05 VITALS
RESPIRATION RATE: 16 BRPM | HEART RATE: 73 BPM | SYSTOLIC BLOOD PRESSURE: 132 MMHG | BODY MASS INDEX: 26.12 KG/M2 | DIASTOLIC BLOOD PRESSURE: 80 MMHG | HEIGHT: 64 IN | TEMPERATURE: 98.2 F | WEIGHT: 153 LBS | OXYGEN SATURATION: 96 %

## 2024-12-05 DIAGNOSIS — Z23 ENCOUNTER FOR IMMUNIZATION: ICD-10-CM

## 2024-12-05 DIAGNOSIS — R05.8 POST-VIRAL COUGH SYNDROME: Primary | ICD-10-CM

## 2024-12-05 ASSESSMENT — PAIN SCALES - GENERAL: PAINLEVEL_OUTOF10: NO PAIN (0)

## 2024-12-05 ASSESSMENT — ENCOUNTER SYMPTOMS: COUGH: 1

## 2024-12-05 NOTE — PATIENT INSTRUCTIONS
At [facility], we strive to deliver an exceptional experience to you, every time we see you. If you receive a survey, please let us know what we are doing well and/or what we could improve upon, as we do value your feedback.  If you have MyChart, you can expect to receive results automatically within 24 hours of their completion.  Your provider will send a note interpreting your results as well.   If you do not have MyChart, you should receive your results in about a week by mail.    Your care team:                            Family Medicine Internal Medicine   MD Cr Singh, MD Sepideh Mcdaniel, MD Skinny Chamberlain, MD Malorie Hoskins, PAKylerC    Steven Salinas, MD Pediatrics   Marixa Leon, MD Di Gardner, MD Nelly Riley, APRN CNP Yolanda Chandler APRN CNP   MD Neetu Rush, MD Renae Holly, CNP     Quinten Foss, CNP Same-Day Provider (No follow-up visits)   FRANCINE Becker, DNP Marianne Locke, FRANCINE Shaw, FNP, BC DENTON GutierrezC     Clinic hours: Monday - Thursday 7 am-6 pm; Fridays 7 am-5 pm.   Urgent care: Monday - Friday 10 am- 8 pm; Saturday and Sunday 9 am-5 pm.    Clinic: [phone]       [facility]: Monday - Thursday 8 am - 7 pm; Friday 8 am - 6 pm  [facility]: [phone]

## 2024-12-05 NOTE — PROGRESS NOTES
"      Janette Coronado is a 89 year old, presenting for the following health issues:        12/5/2024    10:26 AM   Additional Questions   Roomed by Kathe   Accompanied by self        Patient c/o productive cough for almost 1 month. Patient did have upper respiratory symptoms in the beginning. No f/c. No sob, cp. Patient stated coughing has resolved a couple of days ago.    Patient declined Medicare Wellness at this time, will call to schedule on her own.        Review of Systems  Constitutional, HEENT, cardiovascular, pulmonary, GI, , musculoskeletal, neuro, skin, endocrine and psych systems are negative, except as otherwise noted.      Objective    /80 (BP Location: Left arm, Patient Position: Sitting, Cuff Size: Adult Regular)   Pulse 73   Temp 98.2  F (36.8  C) (Temporal)   Resp 16   Ht 1.626 m (5' 4.02\")   Wt 69.4 kg (153 lb)   LMP  (LMP Unknown)   SpO2 96%   BMI 26.25 kg/m    Body mass index is 26.25 kg/m .  Physical Exam   GENERAL: alert and no distress  NECK: no adenopathy, no asymmetry, masses, or scars  RESP: lungs clear to auscultation - no rales, rhonchi or wheezes  CV: regular rate and rhythm, normal S1 S2, no S3 or S4, no murmur, click or rub, no peripheral edema  ABDOMEN: soft, nontender, no hepatosplenomegaly, no masses and bowel sounds normal    A/P:  (R05.8) Post-viral cough syndrome  (primary encounter diagnosis)  Comment:   Plan: lungs clear today. Likely post-viral cough. Discussed coughing usually last sign/symptoms to improve. RTC as needed.    (Z23) Encounter for immunization  Comment:   Plan: zoster vaccine recombinant adjuvanted         (SHINGRIX) injection, Tdap,         tetanus-diptheria-acell pertussis, (BOOSTRIX)         5-2.5-18.5 LF-MCG/0.5 CHATO injection, RSV         vaccine, bivalent, ABRYSVO, injection                Signed Electronically by: Steven Salinas MD, MD    "

## 2025-01-05 DIAGNOSIS — I10 ESSENTIAL HYPERTENSION WITH GOAL BLOOD PRESSURE LESS THAN 140/90: ICD-10-CM

## 2025-01-06 RX ORDER — PROPRANOLOL HYDROCHLORIDE 60 MG/1
CAPSULE, EXTENDED RELEASE ORAL
Qty: 90 CAPSULE | Refills: 2 | Status: SHIPPED | OUTPATIENT
Start: 2025-01-06

## 2025-01-27 DIAGNOSIS — M48.02 CERVICAL STENOSIS OF SPINAL CANAL: ICD-10-CM

## 2025-01-27 RX ORDER — GABAPENTIN 600 MG/1
600 TABLET ORAL 3 TIMES DAILY
Qty: 270 TABLET | Refills: 3 | Status: SHIPPED | OUTPATIENT
Start: 2025-01-27

## 2025-01-28 NOTE — TELEPHONE ENCOUNTER
Controlled Substance Refill Request for Norco  Problem List Complete:  No     PROVIDER TO CONSIDER COMPLETION OF PROBLEM LIST AND OVERVIEW/CONTROLLED SUBSTANCE AGREEMENT    Last Written Prescription Date:  7/11/19  Last Fill Quantity: 80,   # refills: 0    THE MOST RECENT OFFICE VISIT MUST BE WITHIN THE PAST 3 MONTHS. AT LEAST ONE FACE TO FACE VISIT MUST OCCUR EVERY 6 MONTHS. ADDITIONAL VISITS CAN BE VIRTUAL.  (THIS STATEMENT SHOULD BE DELETED.)    Last Office Visit with [facility] primary care provider: Ho    Future Office visit:     Controlled substance agreement:   Encounter-Level CSA:    There are no encounter-level csa.     Patient-Level CSA:    There are no patient-level csa.         Last Urine Drug Screen: No results found for: CDAUT, No results found for: COMDAT, No results found for: THC13, PCP13, COC13, MAMP13, OPI13, AMP13, BZO13, TCA13, MTD13, BAR13, OXY13, PPX13, BUP13     Processing:  Patient will  in clinic     [email or web address]       checked in past 3 months?  No, route to RN - unable to complete  process as Problem List is not complete.    Keri Wade, ALANN, RN, PHN         Patient is at [facility] on [address]. They restocked Lyrica an hour ago stating it was cancelled by the Doctor's office. She is completely out. She will stay at the pharmacy until issue is rectified.  # [phone].

## 2025-01-30 NOTE — TELEPHONE ENCOUNTER
Requested Prescriptions   Pending Prescriptions Disp Refills     HYDROcodone-acetaminophen (NORCO) 5-325 MG tablet            Last Written Prescription Date:  11/20/18  Last Fill Quantity: 80,   # refills: 0  Last Office Visit: 11/20/18  Future Office visit:       Routing refill request to provider for review/approval because:  Drug not on the [facility], P or [facility] refill protocol or controlled substance       80 tablet 0     Sig: Take 1-2 tablets by mouth 3 times daily as needed for pain    There is no refill protocol information for this order              Rj Faarax  [facility]   details… negative

## 2025-03-21 ENCOUNTER — TRANSFERRED RECORDS (OUTPATIENT)
Dept: HEALTH INFORMATION MANAGEMENT | Facility: CLINIC | Age: OVER 89
End: 2025-03-21
Payer: MEDICARE

## 2025-03-28 ENCOUNTER — TRANSFERRED RECORDS (OUTPATIENT)
Dept: HEALTH INFORMATION MANAGEMENT | Facility: CLINIC | Age: OVER 89
End: 2025-03-28
Payer: MEDICARE

## 2025-04-02 ENCOUNTER — PATIENT OUTREACH (OUTPATIENT)
Dept: CARE COORDINATION | Facility: CLINIC | Age: OVER 89
End: 2025-04-02
Payer: MEDICARE

## 2025-04-02 NOTE — LETTER
TCU Hand In    Patient name: Ivonne Gerard  PCP: Steven Salinas  PCP Care Coordinator's information (phone number/email): Xochitl Esposito RN, BSN, PHN Care Coordinator  Sunday Saavedra and Cierra Mooney   Phone: [phone]   Primary family contact: Javon Gerard [phone]  Other MDs involved:     Patient Care Team:  Steven Salinas MD as PCP - General (Family Practice)  Hakan Andersen MD as MD (Cardiovascular Disease)  Steven Salinas MD as Assigned PCP  Xochitl Esposito RN as Clinic Care Coordinator (Primary Care - CC)    Resources in place previously (home care services, equipment needs, etc.):     Advanced Directive: Y    Social History     Socioeconomic History    Marital status:      Spouse name: Not on file    Number of children: Not on file    Years of education: Not on file    Highest education level: Not on file   Occupational History    Not on file   Tobacco Use    Smoking status: Former     Current packs/day: 0.00     Average packs/day: 1 pack/day for 30.0 years (30.0 ttl pk-yrs)     Types: Cigarettes     Start date: 2/10/1956     Quit date: 2/10/1986     Years since quittin.1    Smokeless tobacco: Never    Tobacco comments:     quit 20 yrs   Vaping Use    Vaping status: Never Used   Substance and Sexual Activity    Alcohol use: Yes     Comment: once a month     Drug use: No    Sexual activity: Never     Partners: Male   Other Topics Concern    Parent/sibling w/ CABG, MI or angioplasty before 65F 55M? No     Service No    Blood Transfusions No    Caffeine Concern No    Occupational Exposure No    Hobby Hazards No    Sleep Concern No    Stress Concern No    Weight Concern Yes    Special Diet No    Back Care No    Exercise No    Bike Helmet No    Seat Belt Yes    Self-Exams Yes   Social History Narrative    Not on file     Social Drivers of Health     Financial Resource Strain: Not on file   Food Insecurity: No Food Insecurity (3/21/2025)    Received from [facility]  Vital Sign     Worried About Running Out of Food in the Last Year: Never true     Ran Out of Food in the Last Year: Never true   Transportation Needs: No Transportation Needs (3/21/2025)    Received from [facility]     PRAPARE - Transportation     Lack of Transportation (Medical): No     Lack of Transportation (Non-Medical): No   Physical Activity: Not on file   Stress: Not on file   Social Connections: Not on file   Interpersonal Safety: Not At Risk (3/21/2025)    Received from [facility]     Humiliation, Afraid, Rape, and Kick questionnaire     Fear of Current or Ex-Partner: No     Emotionally Abused: No     Physically Abused: No     Sexually Abused: No   Housing Stability: Low Risk  (3/21/2025)    Received from [facility]     Housing Stability Vital Sign     Unable to Pay for Housing in the Last Year: No     Number of Times Moved in the Last Year: 1     Homeless in the Last Year: No     Please contact me with discharge date, disposition and possible services (home health care, county, family support). My role would follow up with the patient/family with discharge back into the community.     Thank you, Xochitl Esposito RN, BSN, PHN Care Coordinator  Sunday Saavedra and Cierra Mooney   Phone: [phone]

## 2025-04-02 NOTE — PROGRESS NOTES
[facility] Medicare ACO Reach Population - Chart Review      Background: Chart reviewed proactively to support health maintenance initiatives within [facility]'s Medicare ACO Reach Population.     Patient was discharged to TCU on 3/28/25.    Plan:  Primary Care Care Coordination referral placed.     Katherine Clements RN  [facility]

## 2025-04-02 NOTE — PROGRESS NOTES
Clinic Care Coordination Contact  Care Coordination Transition Communication    Clinical Data:  3/21/2025  Fall   3/28/2025 Fall from toilet seat, initial encounter  Type I or II open fracture of right tibia and fibula with routine healing  Frailty  Traumatic wound of the right lower leg   Discharge Disposition: Nursing Home/SNF     Assessment: Patient has transitioned to TCU/ARU for short term rehabilitation:    Facility Name: [facility]   Transition Communication:  Notified facility of Primary Care- Care Coordination support via Epic fax.    Plan: Care Coordinator will await notification from facility staff informing of patient's discharge plans/needs. Care Coordinator will review chart and outreach to facility staff every 4 weeks and as needed.     Xochitl Esposito RN, BSN, PHN Care Coordinator  [place], [place], and Cierra Mooney   Phone: [phone]

## 2025-04-03 ENCOUNTER — LAB REQUISITION (OUTPATIENT)
Dept: LAB | Facility: CLINIC | Age: OVER 89
End: 2025-04-03
Payer: MEDICARE

## 2025-04-03 DIAGNOSIS — I10 ESSENTIAL (PRIMARY) HYPERTENSION: ICD-10-CM

## 2025-04-03 DIAGNOSIS — D62 ACUTE POSTHEMORRHAGIC ANEMIA: ICD-10-CM

## 2025-04-04 LAB
ALBUMIN SERPL BCG-MCNC: 2.4 G/DL (ref 3.5–5.2)
ALP SERPL-CCNC: 145 U/L (ref 40–150)
ALT SERPL W P-5'-P-CCNC: 9 U/L (ref 0–50)
ANION GAP SERPL CALCULATED.3IONS-SCNC: 8 MMOL/L (ref 7–15)
AST SERPL W P-5'-P-CCNC: 23 U/L (ref 0–45)
BASOPHILS # BLD AUTO: 0 10E3/UL (ref 0–0.2)
BASOPHILS NFR BLD AUTO: 0 %
BILIRUB SERPL-MCNC: <0.2 MG/DL
BUN SERPL-MCNC: 16.5 MG/DL (ref 8–23)
CALCIUM SERPL-MCNC: 8.4 MG/DL (ref 8.8–10.4)
CHLORIDE SERPL-SCNC: 106 MMOL/L (ref 98–107)
CREAT SERPL-MCNC: 0.57 MG/DL (ref 0.51–0.95)
EGFRCR SERPLBLD CKD-EPI 2021: 86 ML/MIN/1.73M2
EOSINOPHIL # BLD AUTO: 0.3 10E3/UL (ref 0–0.7)
EOSINOPHIL NFR BLD AUTO: 3 %
ERYTHROCYTE [DISTWIDTH] IN BLOOD BY AUTOMATED COUNT: 17.8 % (ref 10–15)
GLUCOSE SERPL-MCNC: 78 MG/DL (ref 70–99)
HCO3 SERPL-SCNC: 24 MMOL/L (ref 22–29)
HCT VFR BLD AUTO: 28.4 % (ref 35–47)
HGB BLD-MCNC: 8.4 G/DL (ref 11.7–15.7)
IMM GRANULOCYTES # BLD: 0.1 10E3/UL
IMM GRANULOCYTES NFR BLD: 1 %
LYMPHOCYTES # BLD AUTO: 1.2 10E3/UL (ref 0.8–5.3)
LYMPHOCYTES NFR BLD AUTO: 11 %
MCH RBC QN AUTO: 27.5 PG (ref 26.5–33)
MCHC RBC AUTO-ENTMCNC: 29.6 G/DL (ref 31.5–36.5)
MCV RBC AUTO: 93 FL (ref 78–100)
MONOCYTES # BLD AUTO: 0.6 10E3/UL (ref 0–1.3)
MONOCYTES NFR BLD AUTO: 5 %
NEUTROPHILS # BLD AUTO: 8.7 10E3/UL (ref 1.6–8.3)
NEUTROPHILS NFR BLD AUTO: 80 %
NRBC # BLD AUTO: 0 10E3/UL
NRBC BLD AUTO-RTO: 0 /100
PLATELET # BLD AUTO: 527 10E3/UL (ref 150–450)
POTASSIUM SERPL-SCNC: 5 MMOL/L (ref 3.4–5.3)
PROT SERPL-MCNC: 5 G/DL (ref 6.4–8.3)
RBC # BLD AUTO: 3.06 10E6/UL (ref 3.8–5.2)
SODIUM SERPL-SCNC: 138 MMOL/L (ref 135–145)
WBC # BLD AUTO: 10.9 10E3/UL (ref 4–11)

## 2025-04-04 PROCEDURE — 80053 COMPREHEN METABOLIC PANEL: CPT | Mod: ORL | Performed by: FAMILY MEDICINE

## 2025-04-04 PROCEDURE — P9603 ONE-WAY ALLOW PRORATED MILES: HCPCS | Mod: ORL | Performed by: FAMILY MEDICINE

## 2025-04-04 PROCEDURE — 85025 COMPLETE CBC W/AUTO DIFF WBC: CPT | Mod: ORL | Performed by: FAMILY MEDICINE

## 2025-04-04 PROCEDURE — 36415 COLL VENOUS BLD VENIPUNCTURE: CPT | Mod: ORL | Performed by: FAMILY MEDICINE

## 2025-04-08 ENCOUNTER — LAB REQUISITION (OUTPATIENT)
Dept: LAB | Facility: CLINIC | Age: OVER 89
End: 2025-04-08
Payer: MEDICARE

## 2025-04-08 DIAGNOSIS — D62 ACUTE POSTHEMORRHAGIC ANEMIA: ICD-10-CM

## 2025-04-08 DIAGNOSIS — F33.1 MAJOR DEPRESSIVE DISORDER, RECURRENT, MODERATE (H): ICD-10-CM

## 2025-04-09 LAB
FERRITIN SERPL-MCNC: 79 NG/ML (ref 11–328)
HGB BLD-MCNC: 8.8 G/DL (ref 11.7–15.7)
IRON BINDING CAPACITY (ROCHE): 184 UG/DL (ref 240–430)
IRON SATN MFR SERPL: 14 % (ref 15–46)
IRON SERPL-MCNC: 26 UG/DL (ref 37–145)
TRANSFERRIN SERPL-MCNC: 154 MG/DL (ref 200–360)

## 2025-04-09 PROCEDURE — 83550 IRON BINDING TEST: CPT | Mod: ORL | Performed by: FAMILY MEDICINE

## 2025-04-09 PROCEDURE — 82728 ASSAY OF FERRITIN: CPT | Mod: ORL | Performed by: FAMILY MEDICINE

## 2025-04-09 PROCEDURE — 85018 HEMOGLOBIN: CPT | Mod: ORL | Performed by: FAMILY MEDICINE

## 2025-04-09 PROCEDURE — 36415 COLL VENOUS BLD VENIPUNCTURE: CPT | Mod: ORL | Performed by: FAMILY MEDICINE

## 2025-04-09 PROCEDURE — P9604 ONE-WAY ALLOW PRORATED TRIP: HCPCS | Mod: ORL | Performed by: FAMILY MEDICINE

## 2025-04-09 PROCEDURE — 84466 ASSAY OF TRANSFERRIN: CPT | Mod: ORL | Performed by: FAMILY MEDICINE

## 2025-04-12 ENCOUNTER — LAB REQUISITION (OUTPATIENT)
Dept: LAB | Facility: CLINIC | Age: OVER 89
End: 2025-04-12
Payer: MEDICARE

## 2025-04-12 DIAGNOSIS — R19.7 DIARRHEA, UNSPECIFIED: ICD-10-CM

## 2025-04-12 LAB — C DIFF TOX B STL QL: NEGATIVE

## 2025-04-12 PROCEDURE — 87493 C DIFF AMPLIFIED PROBE: CPT | Mod: ORL | Performed by: FAMILY MEDICINE

## 2025-04-29 ENCOUNTER — PATIENT OUTREACH (OUTPATIENT)
Dept: CARE COORDINATION | Facility: CLINIC | Age: OVER 89
End: 2025-04-29
Payer: MEDICARE

## 2025-04-29 NOTE — PROGRESS NOTES
Clinic Care Coordination Contact  Care Coordination Transition Communication    Clinical Data:   3/21/2025  Fall   3/28/2025 Fall from toilet seat, initial encounter  Type I or II open fracture of right tibia and fibula with routine healing  Frailty  Traumatic wound of the right lower leg   Discharge Disposition: Nursing Home/SNF     Assessment: Patient has transitioned to TCU/ARU for short term rehabilitation:    Facility Name: [facility]   Transition Communication:  Notified facility of Primary Care- Care Coordination support via Epic fax.    Update/Plan 4/29/25: CC RN spoke with Uma BAILEY with TCU, patient continues to reside at TCU with no discharge disposition. CC RN provided LYNN with writers direct line asking for call back with a discharge date and plan. Otherwise, CC RN will reach out again in 4 weeks.     Xochitl Esposito RN, BSN, PHN Care Coordinator  [place], [place], and Cierra Mooney   Phone: [phone]

## 2025-05-28 ENCOUNTER — PATIENT OUTREACH (OUTPATIENT)
Dept: CARE COORDINATION | Facility: CLINIC | Age: OVER 89
End: 2025-05-28
Payer: MEDICARE

## 2025-05-28 NOTE — PROGRESS NOTES
Clinic Care Coordination Contact  Care Coordination Transition Communication    Clinical Data:   3/21/2025  Fall   3/28/2025 Fall from toilet seat, initial encounter  Type I or II open fracture of right tibia and fibula with routine healing  Frailty  Traumatic wound of the right lower leg   Discharge Disposition: Nursing Home/SNF      Assessment: Patient has transitioned to TCU/ARU for short term rehabilitation:     Facility Name: [facility]   Transition Communication:  Notified facility of Primary Care- Care Coordination support via Epic fax.     Update/Plan 5/28/25: CC RN lvm with TCU requesting update on patients status and if a discharge plan has been established. CC RN provided SW with writers direct line asking for call back with a discharge date and plan. Otherwise, lead CC RN will reach out again in 4 weeks.        Hernan Esparza RN  Primary Care Clinic Care Coordination  Tele: [phone]   Casual covering for Xochitl Esposito

## 2025-06-05 ENCOUNTER — TELEPHONE (OUTPATIENT)
Dept: FAMILY MEDICINE | Facility: CLINIC | Age: OVER 89
End: 2025-06-05
Payer: MEDICARE

## 2025-06-05 ENCOUNTER — MEDICAL CORRESPONDENCE (OUTPATIENT)
Dept: HEALTH INFORMATION MANAGEMENT | Facility: CLINIC | Age: OVER 89
End: 2025-06-05
Payer: MEDICARE

## 2025-06-05 NOTE — TELEPHONE ENCOUNTER
Home Care is calling regarding an established patient with [facility].  Requesting orders from: Steven Salinas  RN APPROVED: RN able to provide verbal orders.  Home Care will send orders for signature.  RN will close encounter.  Is this a request for a temporary pause in the home care episode?  No    Orders Requested    Skilled Nursing  Request for delay in care, service to be provided within 7 days of previously   Service rescheduled to Saturday, June 7th  RN gave verbal order: Yes      Physical Therapy  Request for delay in care, service to be provided within 7 days of previously   Service rescheduled to Saturday, June 7th  RN gave verbal order: Yes      Occupational Therapy  Request for delay in care, service to be provided within 7 days of previously   Service rescheduled to Saturday, June 7th  RN gave verbal order: Yes        Phone number Home Care can be reached at: [phone] ; [facility]  Okay to leave a detailed message?: Yes    Contacts       Contact Date/Time Type Contact Phone/Fax    06/05/2025 10:46 AM CDT Phone (Incoming) Jennifer  with [facility] [phone]     Secure line and ok to leave detailed voicemails          Kala Barrera, RN

## 2025-06-05 NOTE — TELEPHONE ENCOUNTER
Forms/Letter Request    Type of form/letter: OTHER: Patient Care Order (verbal Order) Tracking Number: [id number]       Do we have the form/letter: Yes:     Who is the form from? [facility] (if other please explain)    Where did/will the form come from? form was faxed in    When is form/letter needed by: ASAP    How would you like the form/letter returned: Fax : [phone]    Patient Notified form requests are processed in 5-7 business days:Yes    Could we send this information to you in [employer] or would you prefer to receive a phone call?:   Patient would prefer a phone call   Okay to leave a detailed message?: Yes at Cell number on file:    Telephone Information:   Mobile [phone]

## 2025-06-05 NOTE — TELEPHONE ENCOUNTER
Form faxed to [facility] [phone] on 6/5/2025 at 1:32pm. Copy placed in abstract and original in TC copy bin.    Latisha Elder

## 2025-06-07 ENCOUNTER — MEDICAL CORRESPONDENCE (OUTPATIENT)
Dept: HEALTH INFORMATION MANAGEMENT | Facility: CLINIC | Age: OVER 89
End: 2025-06-07

## 2025-06-07 ENCOUNTER — LAB REQUISITION (OUTPATIENT)
Dept: LAB | Facility: CLINIC | Age: OVER 89
End: 2025-06-07
Payer: MEDICARE

## 2025-06-07 DIAGNOSIS — D62 ACUTE POSTHEMORRHAGIC ANEMIA: ICD-10-CM

## 2025-06-07 DIAGNOSIS — I10 ESSENTIAL (PRIMARY) HYPERTENSION: ICD-10-CM

## 2025-06-09 ENCOUNTER — TELEPHONE (OUTPATIENT)
Dept: FAMILY MEDICINE | Facility: CLINIC | Age: OVER 89
End: 2025-06-09

## 2025-06-09 LAB
ANION GAP SERPL CALCULATED.3IONS-SCNC: 9 MMOL/L (ref 7–15)
BUN SERPL-MCNC: 17.2 MG/DL (ref 8–23)
CALCIUM SERPL-MCNC: 8.3 MG/DL (ref 8.8–10.4)
CHLORIDE SERPL-SCNC: 106 MMOL/L (ref 98–107)
CREAT SERPL-MCNC: 0.82 MG/DL (ref 0.51–0.95)
EGFRCR SERPLBLD CKD-EPI 2021: 68 ML/MIN/1.73M2
ERYTHROCYTE [DISTWIDTH] IN BLOOD BY AUTOMATED COUNT: 19.4 % (ref 10–15)
GLUCOSE SERPL-MCNC: 82 MG/DL (ref 70–99)
HCO3 SERPL-SCNC: 25 MMOL/L (ref 22–29)
HCT VFR BLD AUTO: 31.1 % (ref 35–47)
HGB BLD-MCNC: 9 G/DL (ref 11.7–15.7)
MCH RBC QN AUTO: 28 PG (ref 26.5–33)
MCHC RBC AUTO-ENTMCNC: 28.9 G/DL (ref 31.5–36.5)
MCV RBC AUTO: 97 FL (ref 78–100)
PLATELET # BLD AUTO: 528 10E3/UL (ref 150–450)
POTASSIUM SERPL-SCNC: 4.6 MMOL/L (ref 3.4–5.3)
RBC # BLD AUTO: 3.22 10E6/UL (ref 3.8–5.2)
SODIUM SERPL-SCNC: 140 MMOL/L (ref 135–145)
WBC # BLD AUTO: 7.9 10E3/UL (ref 4–11)

## 2025-06-09 NOTE — TELEPHONE ENCOUNTER
Forms/Letter Request    Type of form/letter: [facility] - Patient Care Order (Verbal Order )   Order:[id number]    Do we have the form/letter: Yes: placed in provider bin    Who is the form from? [facility]  (if other please explain)    Where did/will the form come from? form was faxed in    When is form/letter needed by: asap    How would you like the form/letter returned: Fax : [phone]    Patient Notified form requests are processed in 5-7 business days:N/A    Could we send this information to you in [employer] or would you prefer to receive a phone call?:   No preference   Okay to leave a detailed message?: N/A at Cell number on file:    Telephone Information:   Mobile [phone]

## 2025-06-10 NOTE — TELEPHONE ENCOUNTER
Form faxed to [facility] [phone] on 6/10/2025 at 11:10am. Copy placed in abstract and original in TC copy bin.   Latisha Elder

## 2025-06-11 ENCOUNTER — TELEPHONE (OUTPATIENT)
Dept: FAMILY MEDICINE | Facility: CLINIC | Age: OVER 89
End: 2025-06-11
Payer: MEDICARE

## 2025-06-11 ENCOUNTER — MEDICAL CORRESPONDENCE (OUTPATIENT)
Dept: HEALTH INFORMATION MANAGEMENT | Facility: CLINIC | Age: OVER 89
End: 2025-06-11
Payer: MEDICARE

## 2025-06-11 ENCOUNTER — PATIENT OUTREACH (OUTPATIENT)
Dept: CARE COORDINATION | Facility: CLINIC | Age: OVER 89
End: 2025-06-11
Payer: MEDICARE

## 2025-06-11 NOTE — TELEPHONE ENCOUNTER
Forms/Letter Request    Type of form/letter: Patient Care Order (Verbal order) OT    Do we have the form/letter: Yes: Dr Steven Salinas's box    Who is the form from? [facility]    Where did/will the form come from? form was faxed in    When is form/letter needed by: Not indicated    How would you like the form/letter returned: Fax : [phone]    Beth Hope MA/  [facility]   Primary Care

## 2025-06-11 NOTE — TELEPHONE ENCOUNTER
Received provider signed forms from [facility] and faxed to [facility] at [phone] on 06/1/2025. Right fax confirmed at 4:11PM.    Vinh Gee

## 2025-06-11 NOTE — TELEPHONE ENCOUNTER
Date: 11/3/2023    Patient Name: Deysi Guerrero          To Whom it may concern: This letter has been written at the patient's request. The above patient was seen at one of the [facility] locations for treatment of a medical condition. The patient may be discharged from the [address] [place] [address] due to being able to be 30 lbs weight bearing after today.          Sincerely,    Jason Kelly Forms/Letter Request     Type of form/letter: Patient Care Order (Verbal order) Physical Therapy      Do we have the form/letter: Yes: Dr Steven Salinas's box     Who is the form from? [facility]     Where did/will the form come from? form was faxed in     When is form/letter needed by: Not indicated     How would you like the form/letter returned: Fax : [phone]

## 2025-06-11 NOTE — PROGRESS NOTES
"Clinic Care Coordination Contact  Transitions of Care Outreach  Chief Complaint   Patient presents with    Clinic Care Coordination - Post Hospital       3/21/2025  Fall   3/28/2025 Fall from toilet seat, initial encounter  Type I or II open fracture of right tibia and fibula with routine healing  Frailty  Traumatic wound of the right lower leg   Discharge Disposition:  [facility]     Patient discharged from TCU to AL facility [facility] in [place] with [facility] 6/3/25    Transitions of Care Assessment    Discharge Assessment  How are you doing now that you are home?: \"Not too bad\"  How are your symptoms? (Red Flag symptoms escalate to triage hotline per guidelines): Unchanged  Do you know how to contact your clinic care team if you have future questions or changes to your health status? : Yes      Jean-Pierre patients son whom is on her C2C answered patients preferred line, which is really Jean-Pierre. CC RN introduced self, role, and reason for call.  He confirmed that he and patient did attend her Dr. Gibbons (plastic surgeon) for wound check.  Patient has Ortho follow up 6/26 to take off the boot. Patient resides at [facility] in [place]. Getting [facility] as well. Patient has established a new primary care provider with on site [facility] has completed the post hospital follow up visit. Jean-Pierre provided the direct line to patient [phone]. CC RN contacted patient, introduced self, role, and reason for call.  Patient confirmed that she has established care with [facility] visiting [facility] group, she reports, \"Every time I have to go I have to spend $100 on a wheelchair van.\" Patient aware she may [facility] and [facility] going forward. Patient stated, \"please tell Dr. Salinas that we loved him.\"    Follow up Plan     No future appointments.    For any urgent concerns, please contact our 24 hour nurse triage line: " [phone] ([phone])       Xochitl Esposito RN

## 2025-06-11 NOTE — Clinical Note
"Greetings!   FYI only, patient has established a new primary care provider with on site [facility]. Patient stated, \"please tell Dr. Salinas that we loved him.\"  Thank you, Xochitl Esposito RN, BSN, PHN Care Coordinator [place], [place], and Cierra Mooney  Phone: [phone] "

## 2025-06-11 NOTE — TELEPHONE ENCOUNTER
Home Care is calling regarding an established patient with [facility].  Requesting orders from: Steven Salinas  RN APPROVED: RN able to provide verbal orders.  Home Care will send orders for signature.  RN will close encounter.  Is this a request for a temporary pause in the home care episode?  No    Orders Requested    Physical Therapy  Request for initial certification (first set of orders)   Frequency: 1 visit this week; twice a week for three weeks; 1 x a week for three weeks  RN gave verbal order: Yes        Phone number Home Care can be reached at:   Contacts       Contact Date/Time Type Contact Phone/Fax    06/11/2025 02:43 PM CDT Phone (Incoming) NNAI Moise with [facility] [phone]     Secure and confidential line          Okay to leave a detailed message?: Yes        Kala Barrera RN

## 2025-06-12 ENCOUNTER — MEDICAL CORRESPONDENCE (OUTPATIENT)
Dept: HEALTH INFORMATION MANAGEMENT | Facility: CLINIC | Age: OVER 89
End: 2025-06-12
Payer: MEDICARE

## 2025-06-12 NOTE — TELEPHONE ENCOUNTER
Form faxed to [facility] [phone] on 6/12/2025 at 7:16am. Copy placed in abstract and original in TC copy bin.    Latisha Elder

## 2025-06-14 DIAGNOSIS — F41.8 ANXIETY ASSOCIATED WITH DEPRESSION: ICD-10-CM

## 2025-06-16 RX ORDER — SERTRALINE HYDROCHLORIDE 100 MG/1
100 TABLET, FILM COATED ORAL DAILY
Qty: 90 TABLET | Refills: 3 | OUTPATIENT
Start: 2025-06-16

## 2025-06-26 ENCOUNTER — TELEPHONE (OUTPATIENT)
Dept: FAMILY MEDICINE | Facility: CLINIC | Age: OVER 89
End: 2025-06-26
Payer: MEDICARE

## 2025-06-26 NOTE — TELEPHONE ENCOUNTER
Forms/Letter Request    Type of form/letter: Home Health Certification (06/07/2025-08-)      Do we have the form/letter: Yes: Placed in Ho's bin    Who is the form from? [facility]     Where did/will the form come from? form was faxed in    When is form/letter needed by: asap    How would you like the form/letter returned: Fax : [phone]    Patient Notified form requests are processed in 5-7 business days:N/A    Could we send this information to you in Sl[place] or would you prefer to receive a phone call?:   Patient would prefer a phone call   Okay to leave a detailed message?: Yes at Cell number on file:    Telephone Information:   Mobile [phone]     Willie Hernandez    [facility]

## 2025-07-01 NOTE — TELEPHONE ENCOUNTER
Form faxed  Beth Hope MA/BARBY    Received provider signed Home Health Certification (06/07/2025-08-) form and faxed to [facility], [phone], right fax confirmed at 7:07 am today, 7/1/2025.  Copy to TC and abstracting.  Beth Hope MA/  [facility]   Primary Care

## 2025-07-14 ENCOUNTER — TELEPHONE (OUTPATIENT)
Dept: FAMILY MEDICINE | Facility: CLINIC | Age: OVER 89
End: 2025-07-14
Payer: MEDICARE

## 2025-07-14 NOTE — TELEPHONE ENCOUNTER
Forms/Letter Request    Type of form/letter: [facility] - Patient Care order  Order NO:[id number]    Do we have the form/letter: Yes: placed in provider bin    Who is the form from? Home care    Where did/will the form come from? form was faxed in    When is form/letter needed by: asap    How would you like the form/letter returned: Fax : [phone]    Patient Notified form requests are processed in 5-7 business days:Yes    Could we send this information to you in onlinetours or would you prefer to receive a phone call?:   Patient would prefer a phone call   Okay to leave a detailed message?: Yes at Cell number on file:    Telephone Information:   Mobile [phone]

## 2025-07-15 ENCOUNTER — MEDICAL CORRESPONDENCE (OUTPATIENT)
Dept: HEALTH INFORMATION MANAGEMENT | Facility: CLINIC | Age: OVER 89
End: 2025-07-15
Payer: MEDICARE

## 2025-07-15 NOTE — TELEPHONE ENCOUNTER
Form faxed  Beth Hope MA/BARBY    Received provider signed  Patient Care order  Order NO:[id number] form and faxed to [facility], [phone], right fax confirmed at 7:56 am today, 7/15/2025.  Copy to TC and abstracting.  Beth Hope MA/  [facility]   Primary Care

## 2025-07-16 ENCOUNTER — TRANSFERRED RECORDS (OUTPATIENT)
Dept: HEALTH INFORMATION MANAGEMENT | Facility: CLINIC | Age: OVER 89
End: 2025-07-16
Payer: MEDICARE

## 2025-07-17 ENCOUNTER — TELEPHONE (OUTPATIENT)
Dept: FAMILY MEDICINE | Facility: CLINIC | Age: OVER 89
End: 2025-07-17
Payer: MEDICARE

## 2025-07-17 ENCOUNTER — MEDICAL CORRESPONDENCE (OUTPATIENT)
Dept: HEALTH INFORMATION MANAGEMENT | Facility: CLINIC | Age: OVER 89
End: 2025-07-17
Payer: MEDICARE

## 2025-07-17 NOTE — TELEPHONE ENCOUNTER
Form faxed to [facility] [phone] on 7/17/2025 at 8:49pm. Copy placed in abstract and original in TC copy bin.   Latisha Elder

## 2025-07-17 NOTE — TELEPHONE ENCOUNTER
Forms/Letter Request    Type of form/letter: OTHER: Order # [id number] Cert Period: 6/7/2025-8/5/2025       Do we have the form/letter: Yes:     Who is the form from? [facility] (if other please explain)    Where did/will the form come from? form was faxed in    When is form/letter needed by: ASAP    How would you like the form/letter returned: Fax : [phone]    Patient Notified form requests are processed in 5-7 business days:Yes    Could we send this information to you in Jamn or would you prefer to receive a phone call?:   Patient would prefer a phone call   Okay to leave a detailed message?: Yes at Cell number on file:    Telephone Information:   Mobile [phone]

## 2025-08-06 ENCOUNTER — LAB REQUISITION (OUTPATIENT)
Dept: LAB | Facility: CLINIC | Age: OVER 89
End: 2025-08-06
Payer: MEDICARE

## 2025-08-06 DIAGNOSIS — D62 ACUTE POSTHEMORRHAGIC ANEMIA: ICD-10-CM

## 2025-08-11 LAB
ERYTHROCYTE [DISTWIDTH] IN BLOOD BY AUTOMATED COUNT: 16.8 % (ref 10–15)
FOLATE SERPL-MCNC: 16.3 NG/ML (ref 4.6–34.8)
HCT VFR BLD AUTO: 29.9 % (ref 35–47)
HGB BLD-MCNC: 8.8 G/DL (ref 11.7–15.7)
IRON BINDING CAPACITY (ROCHE): 223 UG/DL (ref 240–430)
IRON SATN MFR SERPL: 10 % (ref 15–46)
IRON SERPL-MCNC: 23 UG/DL (ref 37–145)
MCH RBC QN AUTO: 27.2 PG (ref 26.5–33)
MCHC RBC AUTO-ENTMCNC: 29.4 G/DL (ref 31.5–36.5)
MCV RBC AUTO: 93 FL (ref 78–100)
PLATELET # BLD AUTO: 409 10E3/UL (ref 150–450)
RBC # BLD AUTO: 3.23 10E6/UL (ref 3.8–5.2)
VIT B12 SERPL-MCNC: 362 PG/ML (ref 232–1245)
WBC # BLD AUTO: 7.1 10E3/UL (ref 4–11)

## 2025-08-11 PROCEDURE — 36415 COLL VENOUS BLD VENIPUNCTURE: CPT | Mod: ORL

## 2025-08-11 PROCEDURE — 82746 ASSAY OF FOLIC ACID SERUM: CPT | Mod: ORL

## 2025-08-11 PROCEDURE — 85027 COMPLETE CBC AUTOMATED: CPT | Mod: ORL

## 2025-08-11 PROCEDURE — 83550 IRON BINDING TEST: CPT | Mod: ORL

## 2025-08-11 PROCEDURE — P9604 ONE-WAY ALLOW PRORATED TRIP: HCPCS | Mod: ORL

## 2025-08-11 PROCEDURE — 82607 VITAMIN B-12: CPT | Mod: ORL

## 2025-08-14 ENCOUNTER — TRANSFERRED RECORDS (OUTPATIENT)
Dept: HEALTH INFORMATION MANAGEMENT | Facility: CLINIC | Age: OVER 89
End: 2025-08-14
Payer: MEDICARE

## 2025-08-24 DIAGNOSIS — E78.5 HYPERLIPIDEMIA LDL GOAL <130: ICD-10-CM

## 2025-08-26 RX ORDER — ATORVASTATIN CALCIUM 10 MG/1
10 TABLET, FILM COATED ORAL DAILY
Qty: 90 TABLET | Refills: 3 | Status: SHIPPED | OUTPATIENT
Start: 2025-08-26